# Patient Record
Sex: MALE | Race: OTHER | Employment: FULL TIME | URBAN - METROPOLITAN AREA
[De-identification: names, ages, dates, MRNs, and addresses within clinical notes are randomized per-mention and may not be internally consistent; named-entity substitution may affect disease eponyms.]

---

## 2017-04-17 ENCOUNTER — APPOINTMENT (OUTPATIENT)
Dept: LAB | Age: 57
End: 2017-04-17
Attending: FAMILY MEDICINE
Payer: COMMERCIAL

## 2017-04-17 ENCOUNTER — OFFICE VISIT (OUTPATIENT)
Dept: FAMILY MEDICINE CLINIC | Facility: CLINIC | Age: 57
End: 2017-04-17

## 2017-04-17 ENCOUNTER — LAB ENCOUNTER (OUTPATIENT)
Dept: LAB | Age: 57
End: 2017-04-17
Attending: FAMILY MEDICINE
Payer: COMMERCIAL

## 2017-04-17 VITALS
HEART RATE: 58 BPM | HEIGHT: 65.5 IN | DIASTOLIC BLOOD PRESSURE: 91 MMHG | SYSTOLIC BLOOD PRESSURE: 147 MMHG | WEIGHT: 184 LBS | TEMPERATURE: 98 F | BODY MASS INDEX: 30.29 KG/M2

## 2017-04-17 DIAGNOSIS — Z12.11 COLON CANCER SCREENING: ICD-10-CM

## 2017-04-17 DIAGNOSIS — Z00.00 PHYSICAL EXAM: Primary | ICD-10-CM

## 2017-04-17 DIAGNOSIS — N52.9 ERECTILE DYSFUNCTION, UNSPECIFIED ERECTILE DYSFUNCTION TYPE: ICD-10-CM

## 2017-04-17 DIAGNOSIS — Z00.00 PHYSICAL EXAM: ICD-10-CM

## 2017-04-17 PROCEDURE — 93010 ELECTROCARDIOGRAM REPORT: CPT

## 2017-04-17 PROCEDURE — 93005 ELECTROCARDIOGRAM TRACING: CPT

## 2017-04-17 PROCEDURE — 80061 LIPID PANEL: CPT

## 2017-04-17 PROCEDURE — 81003 URINALYSIS AUTO W/O SCOPE: CPT

## 2017-04-17 PROCEDURE — 99386 PREV VISIT NEW AGE 40-64: CPT | Performed by: FAMILY MEDICINE

## 2017-04-17 PROCEDURE — 83036 HEMOGLOBIN GLYCOSYLATED A1C: CPT

## 2017-04-17 PROCEDURE — 36415 COLL VENOUS BLD VENIPUNCTURE: CPT

## 2017-04-17 PROCEDURE — 90715 TDAP VACCINE 7 YRS/> IM: CPT | Performed by: FAMILY MEDICINE

## 2017-04-17 PROCEDURE — 85025 COMPLETE CBC W/AUTO DIFF WBC: CPT

## 2017-04-17 PROCEDURE — 90471 IMMUNIZATION ADMIN: CPT | Performed by: FAMILY MEDICINE

## 2017-04-17 PROCEDURE — 84443 ASSAY THYROID STIM HORMONE: CPT

## 2017-04-17 PROCEDURE — 80053 COMPREHEN METABOLIC PANEL: CPT

## 2017-04-17 PROCEDURE — 84153 ASSAY OF PSA TOTAL: CPT | Performed by: FAMILY MEDICINE

## 2017-04-17 PROCEDURE — 82306 VITAMIN D 25 HYDROXY: CPT | Performed by: FAMILY MEDICINE

## 2017-04-17 RX ORDER — SILDENAFIL 50 MG/1
50 TABLET, FILM COATED ORAL
Qty: 10 TABLET | Refills: 1 | Status: SHIPPED | OUTPATIENT
Start: 2017-04-17 | End: 2017-12-20

## 2017-04-17 NOTE — PROGRESS NOTES
4/17/2017  11:06 AM    Phillip Coulter is a 55year old male. Chief complaint(s): Patient presents with:  Routine Physical    HPI:     Phillip Coulter primary complaint is regarding CPE.      Phillip Coulter is a 55year old male is here for routine periodic health scr polydipsia and polyuria. Genitourinary: Positive for sexual dysfunction. Negative for bladder incontinence, urgency, frequency, hematuria, decreased urine volume, discharge, difficulty urinating, genital sores, testicular pain and nocturia.    Musculoskel swelling and no tenderness. Uncircumcised. Musculoskeletal:   Spinal exam without scoliosis. Lymphadenopathy:   Negative for cervical, axillary or inguinal lymphadenopathy. There are no lower extremities edema or varicose veins.    Neurological: No s Recommendations on physical activity; exercise daily or at least 3 times a week for 30-60 minutes doing cardiovascular exercise. Encourage to maintain the best physical and dental hygiene possible.   Consider a  if overweight and/or having d

## 2017-04-18 NOTE — PROGRESS NOTES
Quick Note:    Please notify patient that his/her cholesterol levels were slight elevated.  However, it only requires to follow a low cholesterol / low fat diet, exercise and recheck in 12 months.  ______

## 2017-04-19 RX ORDER — ERGOCALCIFEROL 1.25 MG/1
50000 CAPSULE ORAL WEEKLY
Qty: 12 CAPSULE | Refills: 4 | Status: SHIPPED | OUTPATIENT
Start: 2017-04-19 | End: 2017-05-19

## 2017-04-25 ENCOUNTER — TELEPHONE (OUTPATIENT)
Dept: FAMILY MEDICINE CLINIC | Facility: CLINIC | Age: 57
End: 2017-04-25

## 2017-12-20 ENCOUNTER — OFFICE VISIT (OUTPATIENT)
Dept: FAMILY MEDICINE CLINIC | Facility: CLINIC | Age: 57
End: 2017-12-20

## 2017-12-20 VITALS
HEIGHT: 65.5 IN | HEART RATE: 75 BPM | SYSTOLIC BLOOD PRESSURE: 137 MMHG | DIASTOLIC BLOOD PRESSURE: 76 MMHG | WEIGHT: 189 LBS | BODY MASS INDEX: 31.11 KG/M2

## 2017-12-20 DIAGNOSIS — M54.32 SCIATICA OF LEFT SIDE: Primary | ICD-10-CM

## 2017-12-20 PROCEDURE — 99213 OFFICE O/P EST LOW 20 MIN: CPT | Performed by: NURSE PRACTITIONER

## 2017-12-20 RX ORDER — A/SINGAPORE/GP1908/2015 IVR-180 (H1N1) (AN A/MICHIGAN/45/2015-LIKE VIRUS), A/SINGAPORE/GP2050/2015 (H3N2) (AN A/HONG KONG/4801/2014 - LIKE VIRUS), B/UTAH/9/2014 (A B/PHUKET/3073/2013-LIKE VIRUS), B/HONG KONG/259/2010 (A B/BRISBANE/60/08-LIKE VIRUS) 15; 15; 15; 15 UG/.5ML; UG/.5ML; UG/.5ML; UG/.5ML
INJECTION, SUSPENSION INTRAMUSCULAR
COMMUNITY
Start: 2017-10-16 | End: 2017-12-20 | Stop reason: ALTCHOICE

## 2017-12-20 RX ORDER — PREDNISONE 20 MG/1
TABLET ORAL
Qty: 15 TABLET | Refills: 0 | Status: SHIPPED | OUTPATIENT
Start: 2017-12-20

## 2017-12-20 NOTE — PROGRESS NOTES
HPI  Pt presents with increasing pain down post left leg-starts from mid buttocks to back of knee. No known injury, never had pain like this before. Denies low back pain, difficulty urinating or moving bowels.      Review of Systems   Constitutional: Posit tenderness. Left hip: He exhibits tenderness. He exhibits normal range of motion and normal strength.    Negative for pain w flexion, extension, internal or external rotation to left buttocks and leg; + pain elicited with straight leg raises   Neurol

## 2017-12-20 NOTE — PATIENT INSTRUCTIONS
Ciática    La ciática es eladio afección que produce dolor en la parte baja de la espalda y [place] glúteos, la cadera y la pierna. En ocasiones, podría doler la pierna sin que haya dolor en la espalda.  La ciática se produce cuando un nervio funez está · Es posible que necesite permanecer en cama los primeros días. Denis, tan pronto bc le sea posible, comience a sentarse o caminar. Eso le ayudará a evitar los problemas que se producen por estar mucho tiempo en cama.   · Mientras esté en la cama, intente Si le mcgrath hecho radiografías, las evaluará un radiólogo. Le informarán de los nuevos hallazgos que puedan afectar moody atención [place]. ¿Cuándo debe buscar atención médica?   Llame a moody proveedor de [facility] de inmediato si sucede cualquiera de las si

## 2023-08-17 ENCOUNTER — OFFICE VISIT (OUTPATIENT)
Dept: INTERNAL MEDICINE CLINIC | Facility: CLINIC | Age: 63
End: 2023-08-17

## 2023-08-17 VITALS
HEART RATE: 70 BPM | DIASTOLIC BLOOD PRESSURE: 92 MMHG | TEMPERATURE: 98 F | BODY MASS INDEX: 30.37 KG/M2 | SYSTOLIC BLOOD PRESSURE: 168 MMHG | RESPIRATION RATE: 17 BRPM | OXYGEN SATURATION: 98 % | WEIGHT: 189 LBS | HEIGHT: 66 IN

## 2023-08-17 DIAGNOSIS — Z00.00 ENCOUNTER FOR PREVENTIVE CARE: ICD-10-CM

## 2023-08-17 DIAGNOSIS — E55.9 VITAMIN D DEFICIENCY: ICD-10-CM

## 2023-08-17 DIAGNOSIS — Z00.00 ANNUAL PHYSICAL EXAM: Primary | ICD-10-CM

## 2023-08-17 DIAGNOSIS — I10 PRIMARY HYPERTENSION: ICD-10-CM

## 2023-08-17 PROCEDURE — 3008F BODY MASS INDEX DOCD: CPT | Performed by: INTERNAL MEDICINE

## 2023-08-17 PROCEDURE — 3080F DIAST BP >= 90 MM HG: CPT | Performed by: INTERNAL MEDICINE

## 2023-08-17 PROCEDURE — 3077F SYST BP >= 140 MM HG: CPT | Performed by: INTERNAL MEDICINE

## 2023-08-17 PROCEDURE — 99242 OFF/OP CONSLTJ NEW/EST SF 20: CPT | Performed by: INTERNAL MEDICINE

## 2023-08-17 RX ORDER — LISINOPRIL 5 MG/1
5 TABLET ORAL DAILY
Qty: 30 TABLET | Refills: 2 | Status: SHIPPED | OUTPATIENT
Start: 2023-08-17

## 2023-08-19 ENCOUNTER — LAB ENCOUNTER (OUTPATIENT)
Dept: LAB | Facility: HOSPITAL | Age: 63
End: 2023-08-19
Attending: INTERNAL MEDICINE
Payer: COMMERCIAL

## 2023-08-19 DIAGNOSIS — E55.9 VITAMIN D DEFICIENCY: ICD-10-CM

## 2023-08-19 DIAGNOSIS — Z00.00 ENCOUNTER FOR PREVENTIVE CARE: ICD-10-CM

## 2023-08-19 DIAGNOSIS — Z00.00 ANNUAL PHYSICAL EXAM: ICD-10-CM

## 2023-08-19 DIAGNOSIS — I10 PRIMARY HYPERTENSION: ICD-10-CM

## 2023-08-19 LAB
ALBUMIN SERPL-MCNC: 3.7 G/DL (ref 3.4–5)
ALBUMIN/GLOB SERPL: 0.8 {RATIO} (ref 1–2)
ALP LIVER SERPL-CCNC: 143 U/L
ALT SERPL-CCNC: 60 U/L
ANION GAP SERPL CALC-SCNC: 5 MMOL/L (ref 0–18)
AST SERPL-CCNC: 31 U/L (ref 15–37)
ATRIAL RATE: 63 BPM
BASOPHILS # BLD AUTO: 0.05 X10(3) UL (ref 0–0.2)
BASOPHILS NFR BLD AUTO: 0.7 %
BILIRUB SERPL-MCNC: 0.5 MG/DL (ref 0.1–2)
BILIRUB UR QL: NEGATIVE
BUN BLD-MCNC: 12 MG/DL (ref 7–18)
BUN/CREAT SERPL: 16.9 (ref 10–20)
CALCIUM BLD-MCNC: 9 MG/DL (ref 8.5–10.1)
CHLORIDE SERPL-SCNC: 107 MMOL/L (ref 98–112)
CHOLEST SERPL-MCNC: 183 MG/DL (ref ?–200)
CLARITY UR: CLEAR
CO2 SERPL-SCNC: 25 MMOL/L (ref 21–32)
COMPLEXED PSA SERPL-MCNC: 1.64 NG/ML (ref ?–4)
CREAT BLD-MCNC: 0.71 MG/DL
DEPRECATED RDW RBC AUTO: 40.7 FL (ref 35.1–46.3)
EGFRCR SERPLBLD CKD-EPI 2021: 104 ML/MIN/1.73M2 (ref 60–?)
EOSINOPHIL # BLD AUTO: 0.58 X10(3) UL (ref 0–0.7)
EOSINOPHIL NFR BLD AUTO: 8.7 %
ERYTHROCYTE [DISTWIDTH] IN BLOOD BY AUTOMATED COUNT: 12.3 % (ref 11–15)
FASTING PATIENT LIPID ANSWER: YES
FASTING STATUS PATIENT QL REPORTED: YES
GLOBULIN PLAS-MCNC: 4.5 G/DL (ref 2.8–4.4)
GLUCOSE BLD-MCNC: 97 MG/DL (ref 70–99)
GLUCOSE UR-MCNC: NORMAL MG/DL
HCT VFR BLD AUTO: 48.6 %
HDLC SERPL-MCNC: 51 MG/DL (ref 40–59)
HGB BLD-MCNC: 16.9 G/DL
HGB UR QL STRIP.AUTO: NEGATIVE
IMM GRANULOCYTES # BLD AUTO: 0.03 X10(3) UL (ref 0–1)
IMM GRANULOCYTES NFR BLD: 0.4 %
KETONES UR-MCNC: NEGATIVE MG/DL
LDLC SERPL CALC-MCNC: 108 MG/DL (ref ?–100)
LEUKOCYTE ESTERASE UR QL STRIP.AUTO: NEGATIVE
LYMPHOCYTES # BLD AUTO: 2 X10(3) UL (ref 1–4)
LYMPHOCYTES NFR BLD AUTO: 29.9 %
MCH RBC QN AUTO: 31.2 PG (ref 26–34)
MCHC RBC AUTO-ENTMCNC: 34.8 G/DL (ref 31–37)
MCV RBC AUTO: 89.8 FL
MONOCYTES # BLD AUTO: 0.48 X10(3) UL (ref 0.1–1)
MONOCYTES NFR BLD AUTO: 7.2 %
NEUTROPHILS # BLD AUTO: 3.56 X10 (3) UL (ref 1.5–7.7)
NEUTROPHILS # BLD AUTO: 3.56 X10(3) UL (ref 1.5–7.7)
NEUTROPHILS NFR BLD AUTO: 53.1 %
NITRITE UR QL STRIP.AUTO: NEGATIVE
NONHDLC SERPL-MCNC: 132 MG/DL (ref ?–130)
OSMOLALITY SERPL CALC.SUM OF ELEC: 284 MOSM/KG (ref 275–295)
P AXIS: 59 DEGREES
P-R INTERVAL: 162 MS
PH UR: 6.5 [PH] (ref 5–8)
PLATELET # BLD AUTO: 236 10(3)UL (ref 150–450)
POTASSIUM SERPL-SCNC: 4.4 MMOL/L (ref 3.5–5.1)
PROT SERPL-MCNC: 8.2 G/DL (ref 6.4–8.2)
PROT UR-MCNC: NEGATIVE MG/DL
Q-T INTERVAL: 390 MS
QRS DURATION: 84 MS
QTC CALCULATION (BEZET): 399 MS
R AXIS: 15 DEGREES
RBC # BLD AUTO: 5.41 X10(6)UL
SODIUM SERPL-SCNC: 137 MMOL/L (ref 136–145)
SP GR UR STRIP: 1.01 (ref 1–1.03)
T AXIS: 3 DEGREES
TRIGL SERPL-MCNC: 136 MG/DL (ref 30–149)
TSI SER-ACNC: 3.35 MIU/ML (ref 0.36–3.74)
UROBILINOGEN UR STRIP-ACNC: NORMAL
VENTRICULAR RATE: 63 BPM
VIT D+METAB SERPL-MCNC: 17.3 NG/ML (ref 30–100)
VLDLC SERPL CALC-MCNC: 23 MG/DL (ref 0–30)
WBC # BLD AUTO: 6.7 X10(3) UL (ref 4–11)

## 2023-08-19 PROCEDURE — 82306 VITAMIN D 25 HYDROXY: CPT

## 2023-08-19 PROCEDURE — 93010 ELECTROCARDIOGRAM REPORT: CPT | Performed by: INTERNAL MEDICINE

## 2023-08-19 PROCEDURE — 93005 ELECTROCARDIOGRAM TRACING: CPT

## 2023-08-19 PROCEDURE — 36415 COLL VENOUS BLD VENIPUNCTURE: CPT

## 2023-08-19 PROCEDURE — 84443 ASSAY THYROID STIM HORMONE: CPT

## 2023-08-19 PROCEDURE — 85025 COMPLETE CBC W/AUTO DIFF WBC: CPT

## 2023-08-19 PROCEDURE — 81003 URINALYSIS AUTO W/O SCOPE: CPT

## 2023-08-19 PROCEDURE — 80061 LIPID PANEL: CPT

## 2023-08-19 PROCEDURE — 80053 COMPREHEN METABOLIC PANEL: CPT

## 2023-08-23 NOTE — PROGRESS NOTES
Subjective:     Patient ID: Dante Valadez is a 65year old male. HPI  Patient comes in for first time to establish care for annual physical denies any complaints blood pressure is elevated patient says he is not aware denies any headache no chest pain or shortness of breath  History/Other:   Review of Systems   Constitutional: Negative. HENT: Negative. Eyes: Negative. Respiratory: Negative. Cardiovascular: Negative. Gastrointestinal: Negative. Genitourinary: Negative. Musculoskeletal: Negative. Skin: Negative. Neurological: Negative. Psychiatric/Behavioral: Negative. Current Outpatient Medications   Medication Sig Dispense Refill    lisinopril 5 MG Oral Tab Take 1 tablet (5 mg total) by mouth daily. 30 tablet 2    ergocalciferol 1.25 MG (32735 UT) Oral Cap Take 1 capsule (50,000 Units total) by mouth once a week. 8 capsule 0     Allergies:No Known Allergies    No past medical history on file. History reviewed. No pertinent surgical history. Family History   Problem Relation Age of Onset    Lung Disorder Mother     Heart Disease Father       Social History:   Social History     Socioeconomic History    Marital status:    Tobacco Use    Smoking status: Former     Types: Cigarettes     Quit date: 2008     Years since quitting: 15.6    Smokeless tobacco: Former   Vaping Use    Vaping Use: Never used   Substance and Sexual Activity    Alcohol use: Yes     Comment: occasionally    Drug use: Never        Objective:   Physical Exam  Vitals and nursing note reviewed. Constitutional:       Appearance: He is well-developed. HENT:      Head: Normocephalic and atraumatic. Right Ear: External ear normal.      Left Ear: External ear normal.      Nose: Nose normal.   Eyes:      Conjunctiva/sclera: Conjunctivae normal.      Pupils: Pupils are equal, round, and reactive to light. Cardiovascular:      Rate and Rhythm: Normal rate and regular rhythm.       Heart sounds: Normal heart sounds. Pulmonary:      Effort: Pulmonary effort is normal.      Breath sounds: Normal breath sounds. Abdominal:      General: Bowel sounds are normal.      Palpations: Abdomen is soft. Genitourinary:     Penis: Normal.       Prostate: Normal.      Rectum: Normal.   Musculoskeletal:         General: Normal range of motion. Cervical back: Normal range of motion and neck supple. Skin:     General: Skin is warm and dry. Neurological:      Mental Status: He is alert and oriented to person, place, and time. Deep Tendon Reflexes: Reflexes are normal and symmetric. Assessment & Plan:   Annual physical exam  (primary encounter diagnosis) exam is okay overall labs  Primary hypertension-we will start medication watch diet watch salt intake follow-up back  Encounter for preventiv we will refer to GI for screening colonoscopy e care  Vitamin D deficiency    Orders Placed This Encounter      Vitamin D [E]      CBC With Differential With Platelet      Comp Metabolic Panel (14)      Lipid Panel      TSH W Reflex To Free T4      Urinalysis, Routine      PSA Total, Screen      Meds This Visit:  Requested Prescriptions     Signed Prescriptions Disp Refills    lisinopril 5 MG Oral Tab 30 tablet 2     Sig: Take 1 tablet (5 mg total) by mouth daily.        Imaging & Referrals:  OP REFERRAL TO [facility] GI TELEPHONE COLON SCREEN

## 2023-10-05 ENCOUNTER — OFFICE VISIT (OUTPATIENT)
Dept: INTERNAL MEDICINE CLINIC | Facility: CLINIC | Age: 63
End: 2023-10-05

## 2023-10-05 VITALS
HEART RATE: 61 BPM | HEIGHT: 66 IN | SYSTOLIC BLOOD PRESSURE: 154 MMHG | OXYGEN SATURATION: 98 % | WEIGHT: 191 LBS | TEMPERATURE: 98 F | DIASTOLIC BLOOD PRESSURE: 94 MMHG | BODY MASS INDEX: 30.7 KG/M2

## 2023-10-05 DIAGNOSIS — R79.89 LOW VITAMIN D LEVEL: ICD-10-CM

## 2023-10-05 DIAGNOSIS — E78.5 HYPERLIPIDEMIA, UNSPECIFIED HYPERLIPIDEMIA TYPE: ICD-10-CM

## 2023-10-05 DIAGNOSIS — I10 PRIMARY HYPERTENSION: Primary | ICD-10-CM

## 2023-10-05 PROCEDURE — 99214 OFFICE O/P EST MOD 30 MIN: CPT | Performed by: INTERNAL MEDICINE

## 2023-10-05 PROCEDURE — 3008F BODY MASS INDEX DOCD: CPT | Performed by: INTERNAL MEDICINE

## 2023-10-05 PROCEDURE — 3080F DIAST BP >= 90 MM HG: CPT | Performed by: INTERNAL MEDICINE

## 2023-10-05 PROCEDURE — 3077F SYST BP >= 140 MM HG: CPT | Performed by: INTERNAL MEDICINE

## 2023-10-05 RX ORDER — ERGOCALCIFEROL 1.25 MG/1
50000 CAPSULE ORAL WEEKLY
Qty: 4 CAPSULE | Refills: 0 | Status: SHIPPED | OUTPATIENT
Start: 2023-10-05 | End: 2023-11-04

## 2023-10-05 RX ORDER — LISINOPRIL 5 MG/1
5 TABLET ORAL DAILY
Qty: 30 TABLET | Refills: 2 | Status: SHIPPED | OUTPATIENT
Start: 2023-10-05

## 2023-10-05 NOTE — PROGRESS NOTES
Subjective:     Patient ID: Marcie Chaney is a 65year old male. HPI  Patient comes in today for follow-up seen in August is a new patient blood pressure was elevated we will start on medication patient says he ran out of medication about a week ago he has not taken it while at home blood pressure was much better while taking medication soundsHistory/Other:   Review of Systems   Constitutional: Negative. HENT: Negative. Eyes: Negative. Respiratory: Negative. Cardiovascular: Negative. Gastrointestinal: Negative. Genitourinary: Negative. Musculoskeletal: Negative. Skin: Negative. Neurological: Negative. Psychiatric/Behavioral: Negative. Current Outpatient Medications   Medication Sig Dispense Refill    lisinopril 5 MG Oral Tab Take 1 tablet (5 mg total) by mouth daily. 30 tablet 2    ergocalciferol 1.25 MG (81600 UT) Oral Cap Take 1 capsule (50,000 Units total) by mouth once a week. 4 capsule 0     Allergies:No Known Allergies    Past Medical History:   Diagnosis Date    Essential hypertension 08/17/2023      No past surgical history on file. Family History   Problem Relation Age of Onset    Lung Disorder Mother     Heart Disease Father       Social History:   Social History     Socioeconomic History    Marital status:    Tobacco Use    Smoking status: Former     Types: Cigarettes     Quit date: 1/1/2008     Years since quitting: 15.7    Smokeless tobacco: Former   Vaping Use    Vaping Use: Never used   Substance and Sexual Activity    Alcohol use: Yes     Alcohol/week: 15.0 standard drinks of alcohol     Types: 15 Cans of beer per week     Comment: occasionally    Drug use: Never        Objective:   Physical Exam  Vitals and nursing note reviewed. Constitutional:       Appearance: He is well-developed. HENT:      Head: Normocephalic and atraumatic.       Right Ear: External ear normal.      Left Ear: External ear normal.      Nose: Nose normal.   Eyes: Conjunctiva/sclera: Conjunctivae normal.      Pupils: Pupils are equal, round, and reactive to light. Cardiovascular:      Rate and Rhythm: Normal rate and regular rhythm. Heart sounds: Normal heart sounds. Pulmonary:      Effort: Pulmonary effort is normal.      Breath sounds: Normal breath sounds. Abdominal:      General: Bowel sounds are normal.      Palpations: Abdomen is soft. Genitourinary:     Penis: Normal.       Prostate: Normal.      Rectum: Normal.   Musculoskeletal:         General: Normal range of motion. Cervical back: Normal range of motion and neck supple. Skin:     General: Skin is warm and dry. Neurological:      Mental Status: He is alert and oriented to person, place, and time. Deep Tendon Reflexes: Reflexes are normal and symmetric. Assessment & Plan:   Primary hypertension  (primary encounter diagnosis) we will refill the medication watch diet take it as prescribed follow back in 2 weeks for blood pressure check  Hyperlipidemia, unspecified hyperlipidemia type watch diet we will retest in 6 months  Low vitamin D level continue to take for 4 more weeks then take over-the-counter supplements we can recheck in 6 months     No orders of the defined types were placed in this encounter. Meds This Visit:  Requested Prescriptions     Signed Prescriptions Disp Refills    lisinopril 5 MG Oral Tab 30 tablet 2     Sig: Take 1 tablet (5 mg total) by mouth daily. ergocalciferol 1.25 MG (77732 UT) Oral Cap 4 capsule 0     Sig: Take 1 capsule (50,000 Units total) by mouth once a week.        Imaging & Referrals:  None

## 2023-10-28 ENCOUNTER — OFFICE VISIT (OUTPATIENT)
Dept: INTERNAL MEDICINE CLINIC | Facility: CLINIC | Age: 63
End: 2023-10-28

## 2023-10-28 VITALS
HEART RATE: 67 BPM | BODY MASS INDEX: 30.53 KG/M2 | SYSTOLIC BLOOD PRESSURE: 120 MMHG | RESPIRATION RATE: 17 BRPM | TEMPERATURE: 98 F | WEIGHT: 190 LBS | OXYGEN SATURATION: 98 % | HEIGHT: 66 IN | DIASTOLIC BLOOD PRESSURE: 64 MMHG

## 2023-10-28 DIAGNOSIS — Z00.00 ANNUAL PHYSICAL EXAM: Primary | ICD-10-CM

## 2023-10-28 DIAGNOSIS — E78.5 HYPERLIPIDEMIA, UNSPECIFIED HYPERLIPIDEMIA TYPE: ICD-10-CM

## 2023-10-28 DIAGNOSIS — I10 PRIMARY HYPERTENSION: ICD-10-CM

## 2023-10-28 DIAGNOSIS — E55.9 VITAMIN D DEFICIENCY: ICD-10-CM

## 2023-10-28 PROCEDURE — 99396 PREV VISIT EST AGE 40-64: CPT | Performed by: INTERNAL MEDICINE

## 2023-10-28 PROCEDURE — 3078F DIAST BP <80 MM HG: CPT | Performed by: INTERNAL MEDICINE

## 2023-10-28 PROCEDURE — 3074F SYST BP LT 130 MM HG: CPT | Performed by: INTERNAL MEDICINE

## 2023-10-28 PROCEDURE — 3008F BODY MASS INDEX DOCD: CPT | Performed by: INTERNAL MEDICINE

## 2023-10-28 NOTE — PROGRESS NOTES
Subjective:     Patient ID: Mariza Nava is a 59year old male. HPI    Patient comes in for annual physical overall doing okay denies any complaints last time blood pressure was elevated and started on medication has been taking it blood pressure well controlled denies any chest pain or shortness of breath  He was a smoker before quitting about 15 years ago    History/Other:   Review of Systems   Constitutional: Negative. HENT: Negative. Eyes: Negative. Respiratory: Negative. Cardiovascular: Negative. Gastrointestinal: Negative. Genitourinary: Negative. Musculoskeletal: Negative. Skin: Negative. Neurological: Negative. Psychiatric/Behavioral: Negative. Current Outpatient Medications   Medication Sig Dispense Refill    lisinopril 5 MG Oral Tab Take 1 tablet (5 mg total) by mouth daily. 30 tablet 2    ergocalciferol 1.25 MG (16839 UT) Oral Cap Take 1 capsule (50,000 Units total) by mouth once a week. 4 capsule 0     Allergies:No Known Allergies    Past Medical History:   Diagnosis Date    Essential hypertension 08/17/2023      No past surgical history on file. Family History   Problem Relation Age of Onset    Lung Disorder Mother     Heart Disease Father       Social History:   Social History     Socioeconomic History    Marital status:    Tobacco Use    Smoking status: Former     Types: Cigarettes     Quit date: 1/1/2008     Years since quitting: 15.8    Smokeless tobacco: Former   Vaping Use    Vaping Use: Never used   Substance and Sexual Activity    Alcohol use: Yes     Alcohol/week: 15.0 standard drinks of alcohol     Types: 15 Cans of beer per week     Comment: occasionally    Drug use: Never        Objective:   Physical Exam  Vitals and nursing note reviewed. Constitutional:       Appearance: He is well-developed. HENT:      Head: Normocephalic and atraumatic.       Right Ear: External ear normal.      Left Ear: External ear normal.      Nose: Nose normal. Eyes:      Conjunctiva/sclera: Conjunctivae normal.      Pupils: Pupils are equal, round, and reactive to light. Cardiovascular:      Rate and Rhythm: Normal rate and regular rhythm. Heart sounds: Normal heart sounds. Pulmonary:      Effort: Pulmonary effort is normal.      Breath sounds: Normal breath sounds. Abdominal:      General: Bowel sounds are normal.      Palpations: Abdomen is soft. Genitourinary:     Penis: Normal.       Prostate: Normal.      Rectum: Normal.   Musculoskeletal:         General: Normal range of motion. Cervical back: Normal range of motion and neck supple. Skin:     General: Skin is warm and dry. Neurological:      Mental Status: He is alert and oriented to person, place, and time. Deep Tendon Reflexes: Reflexes are normal and symmetric. Assessment & Plan:   Annual physical exam  (primary encounter diagnosis) exam is okay will order labs well-controlled continue current treatment  Primary hypertension  Hyperlipidemia, unspecified hyperlipidemia type watch diet we will recheck in 6 months  Vitamin D deficiency continue current treatment we will recheck in 6 months  Encourage patient to get cardiac calcium score done  Also given referral to GI before he will schedule an appointment    No orders of the defined types were placed in this encounter.       Meds This Visit:  Requested Prescriptions      No prescriptions requested or ordered in this encounter       Imaging & Referrals:  [facility]

## 2024-04-11 RX ORDER — LISINOPRIL 5 MG/1
5 TABLET ORAL DAILY
Qty: 90 TABLET | Refills: 3 | Status: SHIPPED | OUTPATIENT
Start: 2024-04-11

## 2024-04-11 NOTE — TELEPHONE ENCOUNTER
Refill Per Protocol     Requested Prescriptions   Pending Prescriptions Disp Refills    LISINOPRIL 5 MG Oral Tab [Pharmacy Med Name: LISINOPRIL 5MG TABLETS] 30 tablet 2     Sig: TAKE 1 TABLET(5 MG) BY MOUTH DAILY       Hypertension Medications Protocol Passed - 4/9/2024  5:33 PM        Passed - CMP or BMP in past 12 months        Passed - Last BP reading less than 140/90     BP Readings from Last 1 Encounters:   10/28/23 120/64               Passed - In person appointment or virtual visit in the past 12 mos or appointment in next 3 mos     Recent Outpatient Visits              [facility]    [facility]    6 months ago [facility]    Office Visit    7 months ago Annual physical exam    [facility], Mayank Bender MD    Office Visit    6 years ago Sciatica of left side    [facility], Isabel Alcala APRN    Office Visit    6 years ago Physical exam    [facility], Praveen Lucas MD    Office Visit          Future Appointments         Provider Department Appt Notes    In 2 weeks Mayank Montes MD [facility]                    Passed - EGFRCR or GFRNAA > 50     GFR Evaluation  EGFRCR: 104 , resulted on 8/19/2023                 Future Appointments         Provider Department Appt Notes    In 2 weeks [facility]          Recent Outpatient Visits              5 months ago Annual physical exam    [facility]    Office Visit    6 months ago Primary hypertension    [facility]    Office  Visit    7 months ago Annual physical exam    [facility], Mayank Bender MD    Office Visit    6 years ago Sciatica of left side    [facility], Isabel Alcala APRN    Office Visit    6 years ago Physical exam    [facility], Praveen Lucas MD    Office Visit

## 2024-04-29 ENCOUNTER — TELEPHONE (OUTPATIENT)
Dept: INTERNAL MEDICINE CLINIC | Facility: CLINIC | Age: 64
End: 2024-04-29

## 2025-03-30 ENCOUNTER — HOSPITAL ENCOUNTER (EMERGENCY)
Facility: HOSPITAL | Age: 65
Discharge: HOME | End: 2025-03-30
Attending: STUDENT IN AN ORGANIZED HEALTH CARE EDUCATION/TRAINING PROGRAM
Payer: COMMERCIAL

## 2025-03-30 VITALS
HEIGHT: 65 IN | OXYGEN SATURATION: 97 % | DIASTOLIC BLOOD PRESSURE: 94 MMHG | TEMPERATURE: 97 F | SYSTOLIC BLOOD PRESSURE: 120 MMHG | RESPIRATION RATE: 22 BRPM | HEART RATE: 94 BPM | BODY MASS INDEX: 31.65 KG/M2 | WEIGHT: 190 LBS

## 2025-03-30 DIAGNOSIS — T78.40XA ALLERGIC REACTION, INITIAL ENCOUNTER: Primary | ICD-10-CM

## 2025-03-30 LAB
ANION GAP SERPL CALC-SCNC: 6 MMOL/L (ref 0–18)
BASOPHILS # BLD: 0 X10(3) UL (ref 0–0.2)
BASOPHILS NFR BLD: 0 %
BUN BLD-MCNC: 16 MG/DL (ref 9–23)
BUN/CREAT SERPL: 21.9 (ref 10–20)
CALCIUM BLD-MCNC: 8.5 MG/DL (ref 8.7–10.4)
CHLORIDE SERPL-SCNC: 102 MMOL/L (ref 98–112)
CO2 SERPL-SCNC: 24 MMOL/L (ref 21–32)
CREAT BLD-MCNC: 0.73 MG/DL
DEPRECATED RDW RBC AUTO: 42.2 FL (ref 35.1–46.3)
EGFRCR SERPLBLD CKD-EPI 2021: 102 ML/MIN/1.73M2 (ref 60–?)
EOSINOPHIL # BLD: 0.29 X10(3) UL (ref 0–0.7)
EOSINOPHIL NFR BLD: 4 %
ERYTHROCYTE [DISTWIDTH] IN BLOOD BY AUTOMATED COUNT: 13.5 % (ref 11–15)
GLUCOSE BLD-MCNC: 105 MG/DL (ref 70–99)
HCT VFR BLD AUTO: 38.7 %
HGB BLD-MCNC: 13.4 G/DL
LYMPHOCYTES NFR BLD: 0.94 X10(3) UL (ref 1–4)
LYMPHOCYTES NFR BLD: 13 %
MCH RBC QN AUTO: 29.9 PG (ref 26–34)
MCHC RBC AUTO-ENTMCNC: 34.6 G/DL (ref 31–37)
MCV RBC AUTO: 86.4 FL
METAMYELOCYTES # BLD: 0.07 X10(3) UL
METAMYELOCYTES NFR BLD: 1 %
MONOCYTES # BLD: 0.29 X10(3) UL (ref 0.1–1)
MONOCYTES NFR BLD: 4 %
MORPHOLOGY: NORMAL
NEUTROPHILS # BLD AUTO: 5.67 X10 (3) UL (ref 1.5–7.7)
NEUTROPHILS NFR BLD: 76 %
NEUTS BAND NFR BLD: 2 %
NEUTS HYPERSEG # BLD: 5.62 X10(3) UL (ref 1.5–7.7)
OSMOLALITY SERPL CALC.SUM OF ELEC: 276 MOSM/KG (ref 275–295)
PLATELET # BLD AUTO: 228 10(3)UL (ref 150–450)
PLATELET MORPHOLOGY: NORMAL
POTASSIUM SERPL-SCNC: 3.4 MMOL/L (ref 3.5–5.1)
RBC # BLD AUTO: 4.48 X10(6)UL
SODIUM SERPL-SCNC: 132 MMOL/L (ref 136–145)
TOTAL CELLS COUNTED BLD: 100
WBC # BLD AUTO: 7.2 X10(3) UL (ref 4–11)

## 2025-03-30 PROCEDURE — 99283 EMERGENCY DEPT VISIT LOW MDM: CPT

## 2025-03-30 PROCEDURE — 85025 COMPLETE CBC W/AUTO DIFF WBC: CPT | Performed by: STUDENT IN AN ORGANIZED HEALTH CARE EDUCATION/TRAINING PROGRAM

## 2025-03-30 PROCEDURE — 80048 BASIC METABOLIC PNL TOTAL CA: CPT | Performed by: STUDENT IN AN ORGANIZED HEALTH CARE EDUCATION/TRAINING PROGRAM

## 2025-03-30 PROCEDURE — 36415 COLL VENOUS BLD VENIPUNCTURE: CPT

## 2025-03-30 RX ORDER — PREDNISONE 20 MG/1
60 TABLET ORAL DAILY
Qty: 12 TABLET | Refills: 0 | Status: SHIPPED | OUTPATIENT
Start: 2025-03-31 | End: 2025-04-04

## 2025-03-30 RX ORDER — CETIRIZINE HYDROCHLORIDE 10 MG/1
10 TABLET ORAL DAILY
Qty: 30 TABLET | Refills: 0 | Status: SHIPPED | OUTPATIENT
Start: 2025-03-30 | End: 2025-04-29

## 2025-03-30 NOTE — ED INITIAL ASSESSMENT (HPI)
PT to ED with spouse. PT alert and orientated x4, steady gait.   PT reporting hives since Friday, airway patent.   PT reporting this happened last week but resolved without intervention.   PT reporting unknown allergies, works with wood.

## 2025-03-30 NOTE — ED PROVIDER NOTES
Patient Seen in: [facility] Emergency Department      History     Chief Complaint   Patient presents with    Allergic Rxn Allergies     Stated Complaint: rash/allergic rxn    Subjective:   HPI      64-year-old male history of hypertension presenting with concerns for an allergic reaction.  Accompanied by his spouse.  He states onset of symptoms Friday.  Describes a diffuse rash, involving extremities trunk and face.  States had similar in the past which resolved spontaneously.  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  He denies shortness of breath, throat closing or swelling, difficulty speaking or swallowing.    Objective:     Past Medical History:    Essential hypertension              History reviewed. No pertinent surgical history.             Social History     Socioeconomic History    Marital status:    Tobacco Use    Smoking status: Former     Current packs/day: 0.00     Types: Cigarettes     Quit date: 2008     Years since quittin.2    Smokeless tobacco: Former   Vaping Use    Vaping status: Never Used   Substance and Sexual Activity    Alcohol use: Yes     Alcohol/week: 15.0 standard drinks of alcohol     Types: 15 Cans of beer per week     Comment: occasionally    Drug use: Never                  Physical Exam     ED Triage Vitals   BP 25 1515 145/79   Pulse 25 1515 106   Resp 25 1515 22   Temp 25 1515 97.4 °F (36.3 °C)   Temp src 25 1515 Oral   SpO2 25 1515 96 %   O2 Device 25 1715 None (Room air)       Current Vitals:   Vital Signs  BP: (!) 120/94  Pulse: 94  Resp: 22  Temp: 97.4 °F (36.3 °C)  Temp src: Oral  MAP (mmHg): (!) 104    Oxygen Therapy  SpO2: 97 %  O2 Device: None (Room air)        Physical Exam  Constitutional: awake, alert, no sig distress  HENT: mmm, no lesions,  Neck: normal range of motion, no tenderness, supple.  Eyes: PERRL, EOMI, conjunctiva  normal, no discharge. Sclera anicteric.  Cardiovascular: rr no murmur  Respiratory: Normal breath sounds, no respiratory distress, no wheezing, no chest tenderness.  GI: Bowel sounds normal, Soft, no tenderness, no masses, no pulsatile masses.  : No CVA tenderness.  Skin: Warm, dry,  diffuse urticarial rash -spares palms soles and mucous membranes  Musculoskeletal: Intact distal pulses, no edema, no tenderness, no cyanosis, no clubbing. Good range of motion in all major joints. No tenderness to palpation or major deformities noted. Back- No tenderness.  Neurologic: Alert & oriented x 3, normal motor function, normal sensory function, no focal deficits noted.  Psych: Calm, cooperative, nl affect        ED Course     Labs Reviewed   BASIC METABOLIC PANEL (8) - Abnormal; Notable for the following components:       Result Value    Glucose 105 (*)     Sodium 132 (*)     Potassium 3.4 (*)     BUN/CREA Ratio 21.9 (*)     Calcium, Total 8.5 (*)     All other components within normal limits   CBC WITH DIFFERENTIAL WITH PLATELET - Abnormal; Notable for the following components:    HCT 38.7 (*)     All other components within normal limits   MANUAL DIFFERENTIAL - Abnormal; Notable for the following components:    Lymphocyte Absolute Manual 0.94 (*)     Metamyelocyte Absolute Manual 0.07 (*)     All other components within normal limits   SCAN SLIDE                   MDM      64M presenting with diffuse urticarial rash in the absence of clear allergen.  On arrival hypertensive other vitals are stable reassuring  -No airway concerns  DDx: Allergic urticaria, viral urticaria, vasculitis  Plan basic labs reassess    Labs showed no acute findings.  Will discharge on prednisone, Zyrtec.  Return precautions and follow-up instructions were discussed with patient who voiced understanding and agreement the plan.  All questions were answered to patient satisfaction.    Medical Decision Making      Disposition and Plan     Clinical  Impression:  1. Allergic reaction, initial encounter         Disposition:  Discharge  3/30/2025  5:22 pm    Follow-up:  [facility] Emergency Department  [address]  [facility] [address]  [phone]  Follow up  As needed, If symptoms worsen    Martin Adams MD  [address] [place] [address] [address]  [facility] [address]  [phone]    Follow up            Medications Prescribed:  Discharge Medication List as of 3/30/2025  5:27 PM        START taking these medications    Details   predniSONE 20 MG Oral Tab Take 3 tablets (60 mg total) by mouth daily for 4 days., Normal, Disp-12 tablet, R-0      cetirizine 10 MG Oral Tab Take 1 tablet (10 mg total) by mouth daily., Normal, Disp-30 tablet, R-0                 Supplementary Documentation:

## 2025-04-02 ENCOUNTER — TELEPHONE (OUTPATIENT)
Dept: INTERNAL MEDICINE CLINIC | Facility: CLINIC | Age: 65
End: 2025-04-02

## 2025-04-02 ENCOUNTER — OFFICE VISIT (OUTPATIENT)
Dept: INTERNAL MEDICINE CLINIC | Facility: CLINIC | Age: 65
End: 2025-04-02

## 2025-04-02 VITALS
HEIGHT: 65 IN | BODY MASS INDEX: 31.65 KG/M2 | WEIGHT: 190 LBS | HEART RATE: 80 BPM | SYSTOLIC BLOOD PRESSURE: 144 MMHG | DIASTOLIC BLOOD PRESSURE: 82 MMHG

## 2025-04-02 DIAGNOSIS — T78.40XD ALLERGIC REACTION, SUBSEQUENT ENCOUNTER: ICD-10-CM

## 2025-04-02 DIAGNOSIS — Z12.11 COLON CANCER SCREENING: ICD-10-CM

## 2025-04-02 DIAGNOSIS — Z23 IMMUNIZATION DUE: Primary | ICD-10-CM

## 2025-04-02 DIAGNOSIS — I10 ESSENTIAL HYPERTENSION: ICD-10-CM

## 2025-04-02 DIAGNOSIS — Z76.89 ENCOUNTER TO ESTABLISH CARE WITH NEW DOCTOR: ICD-10-CM

## 2025-04-02 PROCEDURE — 3008F BODY MASS INDEX DOCD: CPT | Performed by: STUDENT IN AN ORGANIZED HEALTH CARE EDUCATION/TRAINING PROGRAM

## 2025-04-02 PROCEDURE — 99214 OFFICE O/P EST MOD 30 MIN: CPT | Performed by: STUDENT IN AN ORGANIZED HEALTH CARE EDUCATION/TRAINING PROGRAM

## 2025-04-02 PROCEDURE — 3077F SYST BP >= 140 MM HG: CPT | Performed by: STUDENT IN AN ORGANIZED HEALTH CARE EDUCATION/TRAINING PROGRAM

## 2025-04-02 PROCEDURE — 3079F DIAST BP 80-89 MM HG: CPT | Performed by: STUDENT IN AN ORGANIZED HEALTH CARE EDUCATION/TRAINING PROGRAM

## 2025-04-02 RX ORDER — DIPHENHYDRAMINE HCL 25 MG
25 TABLET ORAL EVERY 6 HOURS PRN
Qty: 60 TABLET | Refills: 0 | Status: SHIPPED | OUTPATIENT
Start: 2025-04-02

## 2025-04-02 RX ORDER — AMLODIPINE BESYLATE 10 MG/1
10 TABLET ORAL DAILY
Qty: 90 TABLET | Refills: 0 | Status: SHIPPED | OUTPATIENT
Start: 2025-04-02

## 2025-04-02 RX ORDER — PREDNISONE 20 MG/1
40 TABLET ORAL DAILY
Qty: 10 TABLET | Refills: 0 | Status: SHIPPED | OUTPATIENT
Start: 2025-04-02 | End: 2025-04-07

## 2025-04-02 NOTE — PROGRESS NOTES
OFFICE NOTE       The following individual(s) verbally consented to be recorded using ambient AI listening technology and understand that they can each withdraw their consent to this listening technology at any point by asking the clinician to turn off or pause the recording:    Patient name: Karel Ford  Additional names:            Patient ID: Karel Ford is a 64 year old male.  Today's Date: 04/02/25  Chief Complaint: ER F/U (Allergic reaction, rash on stomach) and Establish Care      History of Present Illness  Karel Ford is a 64 year old male with hypertension who presents with a recent allergic reaction.    He experienced a recent allergic reaction of unknown etiology, leading to an emergency room visit on March 30th. His entire body was covered in a rash, which turned purple on his neck, and he had significant swelling in his hands and feet, making ambulation difficult. The symptoms resolved by the following Monday but recurred on Friday. No new foods, medications, or supplements were introduced prior to the reaction. He recently traveled to [place], but symptoms began after his return. He suspects exposure to moldy wood at work might be a trigger, as he works as a crater making wooden pallets. No facial or lip swelling, airway compromise, or new medications. He is currently taking prednisone for the allergic reaction, which has been effective in reducing symptoms. He also uses over-the-counter Benadryl as needed for itching and allergic symptoms.    He has a long-standing history of hypertension and has been taking lisinopril 5 mg daily. He reports a persistent cough, especially at night, which he attributes to lisinopril. His blood pressure remains elevated despite medication, with a recent reading of 144/82 mmHg. He has not been under regular medical care.    He is overdue for colorectal cancer screening and vaccinations, including Prevnar and shingles. No first-degree family history of  colorectal cancer.       Vitals:    04/02/25 1148 04/02/25 1209   BP: 147/79 144/82   Pulse: 80    Weight: 190 lb (86.2 kg)    Height: 5' 5\" (1.651 m)      body mass index is 31.62 kg/m².  BP Readings from Last 3 Encounters:   04/02/25 144/82   03/30/25 (!) 120/94   10/28/23 120/64     The 10-year ASCVD risk score (Lauren NASSAR, et al., 2019) is: 14.3%    Values used to calculate the score:      Age: 64 years      Sex: Male      Is Non- : No      Diabetic: No      Tobacco smoker: No      Systolic Blood Pressure: 144 mmHg      Is BP treated: Yes      HDL Cholesterol: 51 mg/dL      Total Cholesterol: 183 mg/dL  Results           Medications reviewed:  Current Outpatient Medications   Medication Sig Dispense Refill    predniSONE 20 MG Oral Tab Take 2 tablets (40 mg total) by mouth daily for 5 days. TAKE WITH FOOD. 10 tablet 0    diphenhydrAMINE HCl (BENADRYL ALLERGY) 25 MG Oral Tab Take 1 tablet (25 mg total) by mouth every 6 (six) hours as needed for Itching or Allergies. 60 tablet 0    amLODIPine 10 MG Oral Tab Take 1 tablet (10 mg total) by mouth daily. 90 tablet 0    predniSONE 20 MG Oral Tab Take 3 tablets (60 mg total) by mouth daily for 4 days. 12 tablet 0    cetirizine 10 MG Oral Tab Take 1 tablet (10 mg total) by mouth daily. 30 tablet 0         Assessment & Plan    1. Immunization due (Primary)  -     Zoster Recombinant Adjuvanted (Shingrix -Shingles) [35687]  -     Prevnar 20 (PCV20) [10440]  2. Colon cancer screening  -     [facility]  3. Encounter to establish care with new doctor  4. Allergic reaction, subsequent encounter  -     Allergy Referral - Sal Lopez)  -     predniSONE; Take 2 tablets (40 mg total) by mouth daily for 5 days. TAKE WITH FOOD.  Dispense: 10 tablet; Refill: 0  -     diphenhydrAMINE HCl; Take 1 tablet (25 mg total) by mouth every 6 (six) hours as needed for Itching or Allergies.  Dispense: 60 tablet; Refill: 0  5. Essential  hypertension  -     amLODIPine Besylate; Take 1 tablet (10 mg total) by mouth daily.  Dispense: 90 tablet; Refill: 0    Assessment & Plan  Allergic Reaction  Recent allergic reaction with rash, hand and foot swelling, and fever. Possible triggers include mold exposure and lisinopril.  - Refer to allergist for evaluation.  - Advise taking pictures of rash during episodes.  - Prescribe Benadryl for acute episodes.  - Provide prednisone 40 mg for 5 days for emergency use.  - Discontinue lisinopril.    Hypertension  Hypertension with recent /82 mmHg. Lisinopril discontinued. Initiated amlodipine 10 mg with monitoring for leg swelling.  - Prescribe amlodipine 10 mg daily.  - Instruct to monitor BP at home, especially in the morning.  - Follow up in one month to assess BP control.    Colorectal Cancer Screening  Overdue for screening. Prefers noninvasive approach. No first-degree family history.  - Order Cologuard test.    Vaccinations  Due for Prevnar and shingles vaccines. Deferred until allergic symptoms resolve.  - Administer Prevnar and shingles vaccines after resolution of allergic symptoms.    Follow-up  Follow-up needed for BP monitoring and annual physical.  - Schedule follow-up in one month for annual physical and BP evaluation.       Follow Up: As needed/if symptoms worsen or Return in about 4 weeks (around 4/30/2025) for Annual Physical ..         Objective/ Results:   Physical Exam  Constitutional:       Appearance: He is well-developed.   Cardiovascular:      Rate and Rhythm: Normal rate and regular rhythm.      Heart sounds: Normal heart sounds.   Pulmonary:      Effort: Pulmonary effort is normal.      Breath sounds: Normal breath sounds.   Abdominal:      General: Bowel sounds are normal.      Palpations: Abdomen is soft.   Skin:     General: Skin is warm and dry.      Findings: Erythema present.   Neurological:      Mental Status: He is alert and oriented to person, place, and time.      Deep  Tendon Reflexes: Reflexes are normal and symmetric.        Physical Exam  VITALS: BP- 144/82     Reviewed:    There are no active problems to display for this patient.   Allergies[1]     Social History     Socioeconomic History    Marital status:    Tobacco Use    Smoking status: Former     Current packs/day: 0.00     Types: Cigarettes     Quit date: 2008     Years since quittin.2     Passive exposure: Past    Smokeless tobacco: Former   Vaping Use    Vaping status: Never Used   Substance and Sexual Activity    Alcohol use: Yes     Alcohol/week: 3.0 standard drinks of alcohol     Types: 3 Cans of beer per week     Comment: maybe 3 beers a day    Drug use: Never      Review of Systems   Constitutional: Negative.    Respiratory: Negative.     Cardiovascular: Negative.    Gastrointestinal: Negative.    Skin:  Positive for rash.   Neurological: Negative.        All other systems negative unless otherwise stated in ROS or HPI above.       Martin Adams MD  Internal Medicine       Call office with any questions or seek emergency care if necessary.   Patient understands and agrees to follow directions and advice.      ----------------------------------------- PATIENT INSTRUCTIONS-----------------------------------------     Patient Instructions   Controlling High Blood Pressure   High blood pressure (hypertension) is often called the silent killer. This is because many people who have it, don’t know it. It can be very dangerous. High blood pressure can raise your risk of heart attack, stroke, heart disease, and heart failure. Controlling your blood pressure can lower your risk of these problems. It's important to check yourblood pressure regularly. It can save your life.   Blood pressure measurements are given as 2 numbers. Systolic blood pressure is the upper number. This is the pressure when the heart contracts. Diastolic blood pressure is the lower number. This is the pressure when the heartrelaxes  between beats.   Blood pressure is grouped like this:   Normal blood pressure. This is systolic of less than 120 and diastolic of less than 80 (120/80).  Elevated blood pressure.  This is systolic of 120 to 129 and diastolic less than 80.  Stage 1 high blood pressure.  This is systolic of 130 to 139 or diastolic between 80 to 89.  Stage 2 high blood pressure.  This is systolic of 140 or higher or diastolic of 90 or higher.  A heart-healthy lifestyle can help you control your blood pressure withoutmedicines. Below are some things you can do to have a heart-healthy lifestyle.     Eat heart-healthy foods   Choose low-salt, low-fat foods. Limit your sodium to 2,300 mg per day or the amount advised by your healthcare provider.  Limit canned, dried, cured, packaged, and fast foods. These can contain a lot of salt.  Eat 8 to 10 servings of fruits and vegetables every day.  Choose lean meats, fish, or chicken.  Eat whole-grain pasta, brown rice, and beans.  Eat 2 to 3 servings of low-fat or fat-free dairy products.  Ask your doctor about the DASH eating plan. This plan helps reduce blood pressure.  When you go to a restaurant, ask that your meal be made with no added salt.    Stay at a healthy weight   Ask your healthcare provider how many calories to eat a day. Then stick to that number.  Ask your provider what weight range is healthiest for you. If you are overweight, a weight loss of only 3% to 5% of your body weight can help lower blood pressure. A good weight loss goal is to lose 10% of your body weight in a year.  Limit snacks and sweets.  Get regular exercise.    Get more active   Find activities you enjoy. They can be done alone or with friends or family. Try bicycling, dancing, walking, or jogging.  Park farther away from building entrances to walk more.  Use stairs instead of the elevator.  When you can, walk or bike instead of driving.  [place] leaves, garden, or do household repairs.  Be active at a moderate to  vigorous level of physical activity for at least 30 minutes a day for at least 5 days a week.     Manage stress   Make time to relax and enjoy life. Find time to laugh.  Talk about your concerns with your loved ones and your healthcare provider.  Visit with family and friends, and keep up with hobbies.    Limit alcohol and quit smoking   Men should have no more than 2 drinks per day.  Women should have no more than 1 drink per day.  If you smoke, make a plan to stop. Talk with your healthcare provider for help. Smoking greatly raises your risk for heart disease and stroke. Ask your provider about stop-smoking programs and other support.    Blood pressure medicines  If your lifestyle changes aren’t enough, your healthcare provider may prescribe high blood pressure medicine. Take all medicines as prescribed. If you have any questions about yourmedicines, ask your provider before stopping or changing them.   [employer] last reviewed this educational content on12/1/2021    © 3941-0770 [employer]. All rights reserved. This information is not intended as a substitute for professional medical care. Always follow yourhealthcare professional's instruction    Video [employer]  What is High Blood Pressure?  Understand what blood pressure is, the health risks of having high blood pressure, the factors that put you at risk for having high blood pressure, and the importance of working with your healthcare provider to control it.  To watch the video:  Scan the QR code  Using your mobile device, scan the following code:  OR  Go to the website:  [employer]  Enter the prescription code:  3414E    © 4490-3979 [employer]. All rights reserved. This information is not intended as a substitute for professional medical care. Always follow your healthcare professional's instructions.    Video [employer]  Eating Well with High Blood Pressure  Certain foods can make your blood pressure go  too high. Watch and learn how easy it is to have delicious meals without harming your health.     To watch the video:  Scan the QR code  Using your mobile device, scan the following code:  OR  Go to the website:  www.[employer]  Enter the prescription code:   JEAN CARLOS       © 1437-4156 [employer]. All rights reserved. This information is not intended as a substitute for professional medical care. Always follow your healthcare professional's instructions.    Taking Your Blood Pressure  Blood pressure is the force of blood against the artery wall as it moves from the heart through the blood vessels. You can take your own blood pressure reading using a digital monitor. Take your readings the same each time, usingthe same arm. Take readings as often as your healthcare provider advises.   About blood pressure monitors  Blood pressure monitors are designed for certain ages and cases. You can find monitors for older adults, for pregnant women, and for children. Make sure theone you choose is the right one for your age and situation.   Experts advise an automatic cuff monitor that fits on your upper arm (bicep). The cuff should fit your arm size. A cuff that’s too large or too small won't give an accurate reading. Measure around yourupper arm to find your size.   Monitors that attach to your finger or wrist are not as accurate as monitorsfor your upper arm.   Ask your healthcare provider for help in choosing a monitor. Bring your monitorto your next provider visit if you need help in using it the correct way.   The steps below are general instructions for using an automatic digitalmonitor.   Step 1. Relax    Take your blood pressure at the same time every day, such as in the morning or evening. Or take it at the time your healthcare provider advises.  Wait at least 30 minutes after smoking, eating, or exercising. Don't drink coffee, tea, soda, or other caffeinated drinks before checking your blood pressure.  Use the restroom beforehand.  Sit comfortably at a table with both feet on the floor. Don't cross your legs or feet. Place the monitor near you.  Rest for at least 5 minutes before you begin. Make sure there are no distractions. This includes TV, cell phones, and other electronics. Wait to have conversations with others until after you measure you blood pressure.  Step 2. Wrap the cuff    Place your arm on the table, palm up. Your arm should be at the level of your heart. Wrap the cuff around your upper arm, just above your elbow. It’s best done on bare skin, not over clothing. Most cuffs will show you where the blood vessel in the middle of the arm at the inner side of the elbow (the brachial artery) should line up with the cuff. Look in your monitor's instruction booklet for an illustration. You can also bring your cuff to your healthcare provider and have them show you how to correctly place the cuff.  Step 3. Inflate the cuff    Push the button that starts the pump.  The cuff will tighten, then loosen.  The numbers will change. When they stop changing, your blood pressure reading will appear.  Take 2 or 3 readings 1 minute apart, or as advised by your provider.  Step 4. Write down the results of each reading    Write down your blood pressure numbers for each reading. Note the date and time. Keep your results in 1 place, such as a notebook. Even if your monitor has a built-in memory, keep a hard copy of the readings.  Remove the cuff from your arm. Turn off the machine.  Bring your blood pressure records with you to each provider visit.  If you start a new blood pressure medicine, note the day you started the new medicine. Also note the day if you change the dose of your medicine. Measure your blood pressure before your take your medicine. This information goes on your blood pressure recording sheet. This will help your provider check how well the medicine changes are working.  Ask your provider what numbers mean  that you should call them. Also ask what numbers mean that you should get help right away.  [employer] last reviewed this educational content on12/1/2021 © 2000-2022 [employer]. All rights reserved. This information is not intended as a substitute for professional medical care. Always follow yourhealthcare professional's instructions.                     The 21st Century Cures Act makes medical notes available to patients in the interest of transparency.  However, please be advised that this is a medical document.  It is intended as a peer to peer communication.  It is written in medical language and may contain abbreviations or verbiage that are technical and unfamiliar.  It may appear blunt or direct.  Medical documents are intended to carry relevant information, facts as evident, and the clinical opinion of the practitioner.          [1]   Allergies  Allergen Reactions    Lisinopril RASH

## 2025-04-02 NOTE — PATIENT INSTRUCTIONS
Controlling High Blood Pressure   High blood pressure (hypertension) is often called the silent killer. This is because many people who have it, don’t know it. It can be very dangerous. High blood pressure can raise your risk of heart attack, stroke, heart disease, and heart failure. Controlling your blood pressure can lower your risk of these problems. It's important to check yourblood pressure regularly. It can save your life.   Blood pressure measurements are given as 2 numbers. Systolic blood pressure is the upper number. This is the pressure when the heart contracts. Diastolic blood pressure is the lower number. This is the pressure when the heartrelaxes between beats.   Blood pressure is grouped like this:   Normal blood pressure. This is systolic of less than 120 and diastolic of less than 80 (120/80).  Elevated blood pressure.  This is systolic of 120 to 129 and diastolic less than 80.  Stage 1 high blood pressure.  This is systolic of 130 to 139 or diastolic between 80 to 89.  Stage 2 high blood pressure.  This is systolic of 140 or higher or diastolic of 90 or higher.  A heart-healthy lifestyle can help you control your blood pressure withoutmedicines. Below are some things you can do to have a heart-healthy lifestyle.     Eat heart-healthy foods   Choose low-salt, low-fat foods. Limit your sodium to 2,300 mg per day or the amount advised by your healthcare provider.  Limit canned, dried, cured, packaged, and fast foods. These can contain a lot of salt.  Eat 8 to 10 servings of fruits and vegetables every day.  Choose lean meats, fish, or chicken.  Eat whole-grain pasta, brown rice, and beans.  Eat 2 to 3 servings of low-fat or fat-free dairy products.  Ask your doctor about the DASH eating plan. This plan helps reduce blood pressure.  When you go to a restaurant, ask that your meal be made with no added salt.    Stay at a healthy weight   Ask your healthcare provider how many calories to eat a day. Then  stick to that number.  Ask your provider what weight range is healthiest for you. If you are overweight, a weight loss of only 3% to 5% of your body weight can help lower blood pressure. A good weight loss goal is to lose 10% of your body weight in a year.  Limit snacks and sweets.  Get regular exercise.    Get more active   Find activities you enjoy. They can be done alone or with friends or family. Try bicycling, dancing, walking, or jogging.  Park farther away from building entrances to walk more.  Use stairs instead of the elevator.  When you can, walk or bike instead of driving.  [place] leaves, garden, or do household repairs.  Be active at a moderate to vigorous level of physical activity for at least 30 minutes a day for at least 5 days a week.     Manage stress   Make time to relax and enjoy life. Find time to laugh.  Talk about your concerns with your loved ones and your healthcare provider.  Visit with family and friends, and keep up with hobbies.    Limit alcohol and quit smoking   Men should have no more than 2 drinks per day.  Women should have no more than 1 drink per day.  If you smoke, make a plan to stop. Talk with your healthcare provider for help. Smoking greatly raises your risk for heart disease and stroke. Ask your provider about stop-smoking programs and other support.    Blood pressure medicines  If your lifestyle changes aren’t enough, your healthcare provider may prescribe high blood pressure medicine. Take all medicines as prescribed. If you have any questions about yourmedicines, ask your provider before stopping or changing them.   Notice Kiosk last reviewed this educational content on12/1/2021 © 2000-2022 [employer]. All rights reserved. This information is not intended as a substitute for professional medical care. Always follow yourTri[facility] Hospitalcare professional's instruction    Video HealthSheets™  What is High Blood Pressure?  Understand what blood pressure is, the health risks  of having high blood pressure, the factors that put you at risk for having high blood pressure, and the importance of working with your healthcare provider to control it.  To watch the video:  Scan the QR code  Using your mobile device, scan the following code:  OR  Go to the website:  [employer]  Enter the prescription code:  3414E    © 2000-2022 [employer]. All rights reserved. This information is not intended as a substitute for professional medical care. Always follow your healthcare professional's instructions.    Video SeeJay  Eating Well with High Blood Pressure  Certain foods can make your blood pressure go too high. Watch and learn how easy it is to have delicious meals without harming your health.     To watch the video:  Scan the QR code  Using your mobile device, scan the following code:  OR  Go to the website:  www.kramesvideo.com  Enter the prescription code:   QIX       © 2000-2022 [employer]. All rights reserved. This information is not intended as a substitute for professional medical care. Always follow your healthcare professional's instructions.    Taking Your Blood Pressure  Blood pressure is the force of blood against the artery wall as it moves from the heart through the blood vessels. You can take your own blood pressure reading using a digital monitor. Take your readings the same each time, usingthe same arm. Take readings as often as your healthcare provider advises.   About blood pressure monitors  Blood pressure monitors are designed for certain ages and cases. You can find monitors for older adults, for pregnant women, and for children. Make sure theone you choose is the right one for your age and situation.   Experts advise an automatic cuff monitor that fits on your upper arm (bicep). The cuff should fit your arm size. A cuff that’s too large or too small won't give an accurate reading. Measure around yourupper arm to find your  size.   Monitors that attach to your finger or wrist are not as accurate as monitorsfor your upper arm.   Ask your healthcare provider for help in choosing a monitor. Bring your monitorto your next provider visit if you need help in using it the correct way.   The steps below are general instructions for using an automatic digitalmonitor.   Step 1. Relax    Take your blood pressure at the same time every day, such as in the morning or evening. Or take it at the time your healthcare provider advises.  Wait at least 30 minutes after smoking, eating, or exercising. Don't drink coffee, tea, soda, or other caffeinated drinks before checking your blood pressure. Use the restroom beforehand.  Sit comfortably at a table with both feet on the floor. Don't cross your legs or feet. Place the monitor near you.  Rest for at least 5 minutes before you begin. Make sure there are no distractions. This includes TV, cell phones, and other electronics. Wait to have conversations with others until after you measure you blood pressure.  Step 2. Wrap the cuff    Place your arm on the table, palm up. Your arm should be at the level of your heart. Wrap the cuff around your upper arm, just above your elbow. It’s best done on bare skin, not over clothing. Most cuffs will show you where the blood vessel in the middle of the arm at the inner side of the elbow (the brachial artery) should line up with the cuff. Look in your monitor's instruction booklet for an illustration. You can also bring your cuff to your healthcare provider and have them show you how to correctly place the cuff.  Step 3. Inflate the cuff    Push the button that starts the pump.  The cuff will tighten, then loosen.  The numbers will change. When they stop changing, your blood pressure reading will appear.  Take 2 or 3 readings 1 minute apart, or as advised by your provider.  Step 4. Write down the results of each reading    Write down your blood pressure numbers for each  reading. Note the date and time. Keep your results in 1 place, such as a notebook. Even if your monitor has a built-in memory, keep a hard copy of the readings.  Remove the cuff from your arm. Turn off the machine.  Bring your blood pressure records with you to each provider visit.  If you start a new blood pressure medicine, note the day you started the new medicine. Also note the day if you change the dose of your medicine. Measure your blood pressure before your take your medicine. This information goes on your blood pressure recording sheet. This will help your provider check how well the medicine changes are working.  Ask your provider what numbers mean that you should call them. Also ask what numbers mean that you should get help right away.  Kala last reviewed this educational content on12/1/2021 © 2000-2022 [employer]. All rights reserved. This information is not intended as a substitute for professional medical care. Always follow yourhealthcare professional's instructions.

## 2025-04-02 NOTE — TELEPHONE ENCOUNTER
----- Message from Akbar Black MD sent at 4/17/2017  8:50 PM CDT -----  Please notify patient that his/her cholesterol levels were slight elevated.  However, it only requires to follow a low cholesterol / low fat diet, exercise and recheck in 12 month
----- Message from Sabrina Lovell MD sent at 4/19/2017  3:18 PM CDT -----  Please notify patient that his/her blood test showed low levels of vitamin D. A prescription was sent to his pharmacy to take one capsule or tablet, once a week.  This Rx is good
Patient was left a message to call back. Transfer to  first, then [phone] if I'm unavailable.       rx for vit D 50,000 sent 4/19/17
Pt is calling back
Spoke with patient (verified name and ), reviewed information, patient verbalized understanding and agrees with plan.    Low cholesterol/low fat diet information along with exercise program information mailed to pt at his request.  Pt has already picked
needs device

## 2025-04-02 NOTE — TELEPHONE ENCOUNTER
Spoke with pt in office name and  verified  Cologuard order and patients demographics faxed to [facility] at [phone]

## 2025-04-13 LAB — AMB EXT COLOGUARD RESULT: NEGATIVE

## 2025-04-30 ENCOUNTER — TELEPHONE (OUTPATIENT)
Dept: INTERNAL MEDICINE CLINIC | Facility: CLINIC | Age: 65
End: 2025-04-30

## 2025-04-30 NOTE — TELEPHONE ENCOUNTER
Results received from [facility] for cologuard- results negative- results in chart and sent to scan.

## 2025-05-05 ENCOUNTER — OFFICE VISIT (OUTPATIENT)
Dept: INTERNAL MEDICINE CLINIC | Facility: CLINIC | Age: 65
End: 2025-05-05
Payer: COMMERCIAL

## 2025-05-05 VITALS
SYSTOLIC BLOOD PRESSURE: 132 MMHG | HEIGHT: 65 IN | BODY MASS INDEX: 31.26 KG/M2 | HEART RATE: 77 BPM | DIASTOLIC BLOOD PRESSURE: 82 MMHG | WEIGHT: 187.63 LBS

## 2025-05-05 DIAGNOSIS — Z13.6 ENCOUNTER FOR SCREENING FOR CORONARY ARTERY DISEASE: ICD-10-CM

## 2025-05-05 DIAGNOSIS — Z23 NEED FOR VACCINATION: ICD-10-CM

## 2025-05-05 DIAGNOSIS — Z00.00 ANNUAL PHYSICAL EXAM: Primary | ICD-10-CM

## 2025-05-05 DIAGNOSIS — E55.9 VITAMIN D DEFICIENCY: ICD-10-CM

## 2025-05-05 DIAGNOSIS — Z12.5 SCREENING FOR PROSTATE CANCER: ICD-10-CM

## 2025-05-05 DIAGNOSIS — H90.3 BILATERAL SENSORINEURAL HEARING LOSS: ICD-10-CM

## 2025-05-05 DIAGNOSIS — I10 ESSENTIAL HYPERTENSION: ICD-10-CM

## 2025-05-05 DIAGNOSIS — Z23 NEED FOR PNEUMOCOCCAL VACCINE: ICD-10-CM

## 2025-05-05 RX ORDER — ZOSTER VACCINE RECOMBINANT, ADJUVANTED 50 MCG/0.5
1 KIT INTRAMUSCULAR
Qty: 1 EACH | Refills: 1 | Status: SHIPPED | OUTPATIENT
Start: 2025-05-05 | End: 2025-07-04

## 2025-05-05 RX ORDER — AMLODIPINE BESYLATE 10 MG/1
10 TABLET ORAL DAILY
Qty: 90 TABLET | Refills: 1 | Status: SHIPPED | OUTPATIENT
Start: 2025-05-05

## 2025-05-05 NOTE — PATIENT INSTRUCTIONS
Cómo controlar la presión arterial kvng  La presión arterial kvng (hipertensión) se conoce también bc el asesino silencioso. Se la llama así porque muchas personas la tienen sin saberlo. Puede ser muy peligrosa. La presión arterial kvng puede aumentar el riesgo de ataques al corazón, derrames cerebrales, enfermedades del corazón o insuficiencia cardíaca. Controlar la presión arterial puede disminuir el riesgo de tener estos problemas. Es importantecontrolar la presión arterial de manera periódica. [place] puede salvarle la bar.   Las mediciones de la presión arterial se indican con 2 números. La presión arterial sistólica es el número superior. Es la presión cuando el corazón se contrae. La presión arterial diastólica es el número inferior. Es la presión cuando el corazón se relaja entrelatidos.   La presión arterial se agrupa de la siguiente manera:  Presión arterial normal. Se llama de esta manera cuando la presión sistólica es mark que 120 y la diastólica es mark que 80 (120/80).  Presión arterial elevada. Se llama de esta manera cuando la presión sistólica es de 120 a 129 y la diastólica es mark que 80.  Presión arterial kvng de etapa 1. La presión sistólica está entre 130 y 139 o la diastólica está entre 80 y 89.  Presión arterial kvng de etapa 2. La presión sistólica es 140 o superior o la diastólica es 90 o superior.  Un estilo de bar saludable para el corazón puede ayudarlo a controlar moody presión arterial sin tener que nikita medicamentos. Más abajo encontrará algunasrecomendaciones a fin de llevar un estilo de bar saludable para el corazón.     Coma alimentos saludables para el corazón  Elija alimentos con bajo contenido de sodio y grasas. Limite la ingesta de sodio a 2,300 mg diarios o según la cantidad que le indique el proveedor de atención médica.  Limite el consumo de comidas enlatadas, secas, curadas, envasadas y las comidas rápidas. Pueden contener mucha sal.  Consuma entre ocho y christina porciones  de frutas y verduras todos los almar.  Elija cedric de res magras, pescado o sulema.  Coma pasta y arroz integrales y frijoles.  Consuma entre dos y laurent porciones de productos lácteos descremados o bajos en grasa.  Consulte con moody médico acerca del plan de alimentación DASH. Ramya plan ayuda a reducir la presión arterial.  Cuando coma en un restaurante, pida que le preparen los platos sin sal.    Mantenga un peso saludable  Pregunte a moody proveedor de atención médica cuántas calorías puede consumir por día. Respete jeremias cantidad.  Pregunte al proveedor cuáles son los límites de peso más adecuados para usted. Si tiene sobrepeso, perder entre un 3 % y un 5 % de moody peso corporal puede ayudarlo a disminuir moody presión arterial. Un buen objetivo es perder un 10 % de moody peso corporal en un año.  Limite el consumo de tentempiés y dulces.  Rebecca ejercicio con regularidad.    Rebecca más actividad física  Busque actividades que le gusten. Puede hacerlas de forma individual, o con amigos o moody uche. Pruebe andar en bicicleta, bailar, caminar o trotar.  Estacione lejos de las entradas de los edificios para caminar más.  Use las escaleras en lugar del ascensor.  Cuando pueda, camine o vaya en bicicleta en vez de ir en automóvil.  [place] hojas, trabaje en moody jardín o rebecca reparaciones en la casa.  Rebecca actividad física de moderada a intensa jarek, al menos, 30 minutos por día, un mínimo de orquidea días a la semana.     Controle el estrés  Tómese tiempo para relajarse y disfrutar de la bar. Encuentre tiempo para reírse.  Comparta fred preocupaciones con fred seres queridos y con el proveedor de atención médica.  Visite a fred familiares y amigos, y mantenga fred pasatiempos favoritos.    Limite el alcohol y deje de fumar  Los hombres no deberían nikita más de dos bebidas alcohólicas por día.  Las mujeres no deberían nikita más de eladio bebida alcohólica por día.  Si fuma, elabore un plan para dejar de hacerlo. Pídale ayuda a moody proveedor de  atención médica. Si fuma mucho, aumenta el riesgo de padecer enfermedades del corazón y derrames cerebrales. Consulte con el proveedor sobre programas para dejar de fumar y otros recursos de apoyo.    Medicamentos para la presión arterial  Si los cambios en el estilo de bar no son suficientes, el proveedor de atención médica puede recetarle medicamentos para la presión arterial haritha. [place] todos los medicamentos según le indiquen. Si tiene alguna pregunta sobre los medicamentos, consulte al proveedor antes de dejar de tomarlos ocambiarlos.     © 2000-2022 [employer]. Todos los derechos reservados. Esta información no pretende sustituir la atención médica profesional. Sólo sumédico puede diagnosticar y tratar un problema de amanda.    Video [employer]road  ¿Qué es Presión Arterial Haritha?  Entender que es Presión Arterial, los riesgos en la amanda al tener Presión Arterial Haritha, los factores que lo ponen a usted en riesgo de tener Presión Arterial [place] y la importancia de trabajar con moody proveedor del cuidado de la amanda para controlarla.  Para daylin el video:  Escanee el código code  Utilizando moody dispositivo móvil, escanee el siguiente código:  OR  Jesus al sitio web:  ViewReple.com  Ingrese el código de receta:  3414S    © 2000-2022 [employer]. All rights reserved. This information is not intended as a substitute for professional medical care. Always follow your healthcare professional's instructions.    Video [employer]abroad  Carlin john con presión arterial elevada  Ciertos alimentos pueden hacer que moody presión suba demasiado. Remi y aprenda lo fácil que es disfrutar de comidas deliciosas sin dañar moody amanda.     Para daylin el video:  Escanee el código QR  Utilizando moody dispositivo móvil, escanee el siguiente código:  DENISE Lee al [employer] Technology[employer] web:  www.Corso12  Ingrese el código de receta:   DPX    © 8873-7340 [employer]. All rights reserved. This information is not  intended as a substitute for professional medical care. Always follow your healthcare professional's instructions.    Cómo tomarse la presión arterial  La presión arterial es la fuerza que la zuleyma ejerce contra las plummer de los vasos al desplazarse por ellos. Usted puede tomarse moody propia presión arterial con un medidor digital. Tómese la presión arterial a la misma hora y en elmismo brazo, tan a menudo bc le indique moody proveedor de atención médica.   Acerca de los monitores de presión arterial   Los monitores de presión arterial están diseñados para diferentes edades y diferentes casos. Usted puede encontrar monitores para adultos mayores, para mujeres embarazadas y para niños. Asegúrese de que el que escoja es el adecuadopara moody edad y situación.   La American Heart Association recomienda un monitor automatizado con manguito que se adapte al tamaño de moody brazo (biceps). Moody brazo debe caber john dentro del manguito, ya que si es muy neil o muy pequeño no suministrará eladio lectura exacta. Mídase el grosor de moody barazo paraencontrar la talla correcta para usted.   Los monitores que se ahieren a un dedo o a la demetrius no son tan precisos comolos que se adhieren a moody brazo.   Pídale a moody proveedor de atención médica que le ayude a escoger un monitor. Traiga el monitor a moody próxima asuncion médica si necesita ayuda para aprender ausarlo correctamente.   Los pasos que se dan a continuación son instrucciones generales para usar unmonitor digital automatizado.     1. Relájese  Tómese la presión arterial a la misma hora todos los días, bc temprano en la mañana o al final de la tarde.  Espere al menos eladio hora después de trinh fumado, comido o hecho ejercicio. No terrence café, té, soda u otras bebidas cafeínadas antes de tomarse la presión.  Siéntese cómodamente a la russo. Coloque el medidor digital cerca de usted.  Descanse jarek algunos minutos antes de empezar.    2. Coloque el manguito  Ponga el brazo sobre la russo,  con la godoy de la mano hacia arriba. El brazo debe quedar a la altura del corazón. Ajuste el manguito alrededor del brazo, ambika por encima del codo. Es mejor colocarlo sobre la piel desnuda, sin ropa. La mayoría de los manguitos le indicarán donde debe alinearlo con la arteria braquial (el vaso sanguíneo en la mitad del brazo en la parte interna del codo). Arianna las instrucciones que vienen con moody monitor para daylin eladio ilustración. También puede llevar moody manguito a la asuncion con moody proveedor de atención médica para que le muestren cómo usarlo correctamente.    3. Infle el manguito  Oprima el botón para iniciar el bombeo automático.  El manguito sherine se aprieta y luego se afloja.  Los números cambian. Cuando paran de cambiar, el medidor indicará moody presión arterial.  Tómese 2 o 3 lecturas con un minuto de diferencia entre eladio y otra.      4. Anote los resultados  [place] nota de los números de moody presión arterial, así bc de la fecha y la hora de la medición. Mantenga los resultados en un solo lugar, por ejemplo en un cuaderno. Incluso si moody monitor tiene eladio memoria incorporada, mantenga eladio copia por escrito de los resultados.  Quite el manguito del brazo y apague la máquina.  Traiga los resultados de fred lecturas a cada eladio de las citas con moody proveedor de atención médica. También anote el días si cambió la dosis de moody medicamento. Fransico inforamación debe incuirse en los registros que rebecca de moody presión arterial para que le ayude a moody proveedor de atención médica a evaluar qué tan john están funcionando los cambios en el medicamento.  Pregúntele a moody proveedor de atención médica a qué niveles debe obtener ayuda de inmediato.      © 6764-6029 [employer]. Todos los derechos reservados. Esta información no pretende sustituir la atención médica profesional. Sólo sumédico puede diagnosticar y tratar un problema de amanda.

## 2025-05-06 NOTE — PROGRESS NOTES
OFFICE NOTE       The following individual(s) verbally consented to be recorded using ambient AI listening technology and understand that they can each withdraw their consent to this listening technology at any point by asking the clinician to turn off or pause the recording:    Patient name: Karel Ford  Additional names:           Patient ID: Karel Ford is a 64 year old male.  Today's Date: 05/05/25  Chief Complaint: Routine Physical      History of Present Illness  Karel Ford is a 64 year old male with hypertension and hyperlipidemia who presents for a routine physical examination. He is accompanied by his partner, who has been with him for over thirty years.    He has a history of hypertension and hyperlipidemia. His blood pressure was previously elevated, but at home, the highest reading in the past month was 138 mmHg, and the most recent reading was 127/70 mmHg. He is currently taking amlodipine 10 mg daily. No chest pain, and a normal baseline EKG from September 13, 2023, showing normal sinus rhythm. He has never had a calcium CT scan for atherosclerosis evaluation.    Regarding urinary symptoms, he does not wake up more than twice at night to urinate and feels relieved after urination. He has no issues with incomplete bladder emptying.    He has a history of exposure to loud noises at work, which may have contributed to hearing loss. He has difficulty hearing, especially in public settings, and sometimes speaks loudly on the phone. He uses foam earplugs at work but not more protective hearing devices. His partner mentions that he is losing his hearing, and he sometimes does not hear whispers. 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    He consumes a lot of hot peppers, sometimes eating five or six at a time, which may contribute to his blood pressure issues. He does not consume a lot of salt otherwise.       Vitals:     05/05/25 1742 05/05/25 1811   BP: 150/70 132/82   Pulse: 77    Weight: 187 lb 9.6 oz (85.1 kg)    Height: 5' 5\" (1.651 m)      body mass index is 31.22 kg/m².  BP Readings from Last 3 Encounters:   05/05/25 132/82   04/02/25 144/82   03/30/25 (!) 120/94     The 10-year ASCVD risk score (Lauren NASSAR, et al., 2019) is: 12.4%    Values used to calculate the score:      Age: 64 years      Sex: Male      Is Non- : No      Diabetic: No      Tobacco smoker: No      Systolic Blood Pressure: 132 mmHg      Is BP treated: Yes      HDL Cholesterol: 51 mg/dL      Total Cholesterol: 183 mg/dL  Results  DIAGNOSTIC  EKG: Normal sinus rhythm (09/13/2023)       Medications reviewed:  Current Medications[1]      Assessment & Plan    1. Annual physical exam (Primary)  -     CT CALCIUM SCORING; Future; Expected date: 05/05/2025  -     PSA Total, Screen; Future; Expected date: 05/05/2025  -     Lipid Panel; Future; Expected date: 05/05/2025  -     TSH W Reflex To Free T4; Future; Expected date: 05/05/2025  -     Hemoglobin A1C; Future; Expected date: 05/05/2025  -     Comp Metabolic Panel (14); Future; Expected date: 05/05/2025  -     CBC With Differential With Platelet; Future; Expected date: 05/05/2025  -     Vitamin D; Future; Expected date: 05/05/2025  2. Essential hypertension  -     Microalb/Creat Ratio, Random Urine; Future; Expected date: 05/05/2025  -     amLODIPine Besylate; Take 1 tablet (10 mg total) by mouth daily.  Dispense: 90 tablet; Refill: 1  3. Encounter for screening for coronary artery disease  -     CT CALCIUM SCORING; Future; Expected date: 05/05/2025  4. Screening for prostate cancer  -     PSA Total, Screen; Future; Expected date: 05/05/2025  5. Vitamin D deficiency  -     Vitamin D; Future; Expected date: 05/05/2025  6. Need for pneumococcal vaccine  -     Prevnar 20 (PCV20) [65709]  7. Need for vaccination  -     Shingrix; Inject 1 each into the muscle Every 2 (two) months.  Dispense: 1  each; Refill: 1  -     ZOSTER VACC RECOMBINANT IM NJX  8. Bilateral sensorineural hearing loss  -     ENT Referral - In Network    Assessment & Plan  Wellness Visit  Discussed blood pressure management, preventive screenings, PSA testing, and calcium CT scan. Recommended pneumococcal and shingles vaccinations.  - Order CBC, CMP, A1c, TSH, lipid panel, PSA, and microalbumin/creatinine ratio.  - Check vitamin D level and start supplementation if needed.  - Discuss calcium CT scan for atherosclerosis evaluation.  - Administer pneumococcal and shingles vaccines.  - Refer to ENT for hearing evaluation and audiometry testing.    Essential hypertension  Blood pressure improved with home readings, highest at 138/81 mmHg. Continued amlodipine 10 mg daily. Discussed lifestyle modifications. Advised morning blood pressure monitoring.  - Continue amlodipine 10 mg daily.  - Advise reducing salt and pepper intake.  - Monitor blood pressure at home, especially in the morning.  - Consider adjusting medication if blood pressure remains above 130/80 mmHg.    Hearing loss  Reported hearing loss likely due to age and occupational noise exposure. Referral to ENT for further evaluation.  - Refer to ENT for hearing evaluation and audiometry testing.    Allergic reaction to lisinopril  Previous allergic reaction to lisinopril. Off Benadryl. Plan to evaluate potential reintroduction of antihypertensive medication after allergist consultation.  - Follow up with allergist on May 20, 2025.       Follow Up: As needed/if symptoms worsen or Return in about 12 weeks (around 7/28/2025) for Blood pressure follow Up and second shingles vaccine. ..   Objective/ Results:   Physical Exam  Constitutional:       Appearance: He is well-developed.   Cardiovascular:      Rate and Rhythm: Normal rate and regular rhythm.      Heart sounds: Normal heart sounds.   Pulmonary:      Effort: Pulmonary effort is normal.      Breath sounds: Normal breath sounds.    Abdominal:      General: Bowel sounds are normal.      Palpations: Abdomen is soft.   Skin:     General: Skin is warm and dry.   Neurological:      Mental Status: He is alert and oriented to person, place, and time.      Deep Tendon Reflexes: Reflexes are normal and symmetric.        Physical Exam  VITALS: BP- 132/80  HEENT: Inner ear problem.  CARDIOVASCULAR: Heart normal.     Reviewed:    There are no active problems to display for this patient.     Allergies[2]   Short Social Hx on File[3]   Review of Systems   Constitutional: Negative.    HENT:  Positive for hearing loss.    Respiratory: Negative.     Cardiovascular: Negative.    Gastrointestinal: Negative.    Skin: Negative.    Neurological: Negative.        All other systems negative unless otherwise stated in ROS or HPI above.       Martin Adams MD  Internal Medicine       Call office with any questions or seek emergency care if necessary.   Patient understands and agrees to follow directions and advice.      ----------------------------------------- PATIENT INSTRUCTIONS-----------------------------------------     Patient Instructions   Cómo controlar la presión arterial kvng  La presión arterial kvng (hipertensión) se conoce también bc el asesino silencioso. Se la llama así porque muchas personas la tienen sin saberlo. Puede ser muy peligrosa. La presión arterial kvng puede aumentar el riesgo de ataques al corazón, derrames cerebrales, enfermedades del corazón o insuficiencia cardíaca. Controlar la presión arterial puede disminuir el riesgo de tener estos problemas. Es importantecontrolar la presión arterial de manera periódica. [place] puede salvarle la bar.   Las mediciones de la presión arterial se indican con 2 números. La presión arterial sistólica es el número superior. Es la presión cuando el corazón se contrae. La presión arterial diastólica es el número inferior. Es la presión cuando el corazón se relaja entrelatidos.   La presión arterial se  agrupa de la siguiente manera:  Presión arterial normal. Se llama de esta manera cuando la presión sistólica es mark que 120 y la diastólica es mark que 80 (120/80).  Presión arterial elevada. Se llama de esta manera cuando la presión sistólica es de 120 a 129 y la diastólica es mark que 80.  Presión arterial kvng de etapa 1. La presión sistólica está entre 130 y 139 o la diastólica está entre 80 y 89.  Presión arterial kvng de etapa 2. La presión sistólica es 140 o superior o la diastólica es 90 o superior.  Un estilo de bar saludable para el corazón puede ayudarlo a controlar moody presión arterial sin tener que nikita medicamentos. Más abajo encontrará algunasrecomendaciones a fin de llevar un estilo de bar saludable para el corazón.     Coma alimentos saludables para el corazón  Elija alimentos con bajo contenido de sodio y grasas. Limite la ingesta de sodio a 2,300 mg diarios o según la cantidad que le indique el proveedor de atención médica.  Limite el consumo de comidas enlatadas, secas, curadas, envasadas y las comidas rápidas. Pueden contener mucha sal.  Consuma entre ocho y christina porciones de frutas y verduras todos los lamar.  Elija cedric de res magras, pescado o sulema.  Coma pasta y arroz integrales y frijoles.  Consuma entre dos y laurent porciones de productos lácteos descremados o bajos en grasa.  Consulte con moody médico acerca del plan de alimentación DASH. Ramya plan ayuda a reducir la presión arterial.  Cuando coma en un restaurante, pida que le preparen los platos sin sal.    Mantenga un peso saludable  Pregunte a moody proveedor de atención médica cuántas calorías puede consumir por día. Respete jeremias cantidad.  Pregunte al proveedor cuáles son los límites de peso más adecuados para usted. Si tiene sobrepeso, perder entre un 3 % y un 5 % de moody peso corporal puede ayudarlo a disminuir moody presión arterial. Un buen objetivo es perder un 10 % de moody peso corporal en un año.  Limite el consumo de tentempiés y  dulces.  [place] ejercicio con regularidad.    [place] más actividad física  Busque actividades que le gusten. Puede hacerlas de forma individual, o con amigos o moody uche. Pruebe andar en bicicleta, bailar, caminar o trotar.  Estacione lejos de las entradas de los edificios para caminar más.  Use las escaleras en lugar del ascensor.  Cuando pueda, camine o vaya en bicicleta en vez de ir en automóvil.  [place] hojas, trabaje en moody jardín o lincoln reparaciones en la casa.  [place]incoln actividad física de moderada a intensa jarek, al menos, 30 minutos por día, un mínimo de orquidea días a la semana.     Controle el estrés  Tómese tiempo para relajarse y disfrutar de la bar. Encuentre tiempo para reírse.  Comparta fred preocupaciones con fred seres queridos y con el proveedor de atención médica.  Visite a fred familiares y amigos, y mantenga fred pasatiempos favoritos.    Limite el alcohol y deje de fumar  Los hombres no deberían nikita más de dos bebidas alcohólicas por día.  Las mujeres no deberían nikita más de eladio bebida alcohólica por día.  Si fuma, elabore un plan para dejar de hacerlo. Pídale ayuda a moody proveedor de atención médica. Si fuma mucho, aumenta el riesgo de padecer enfermedades del corazón y derrames cerebrales. Consulte con el proveedor sobre programas para dejar de fumar y otros recursos de apoyo.    Medicamentos para la presión arterial  Si los cambios en el estilo de bar no son suficientes, el proveedor de atención médica puede recetarle medicamentos para la presión arterial kvng. [place] todos los medicamentos según le indiquen. Si tiene alguna pregunta sobre los medicamentos, consulte al proveedor antes de dejar de tomarlos ocambiarlos.     © 2819-7381 [employer]. Todos los derechos reservados. Esta información no pretende sustituir la atención médica profesional. Sólo sumédico puede diagnosticar y tratar un problema de amanda.    [place]  ¿Qué es Presión Arterial [place]?  Entender que es  Presión Arterial, los riesgos en la amanda al tener Presión Arterial [place], los factores que lo ponen a usted en riesgo de tener Presión Arterial [place] y la importancia de trabajar con moody proveedor del cuidado de la amanda para controlarla.  Para daylin el video:  Escanee el código code  Utilizando moody dispositivo móvil, escanee el siguiente código:  OR  Vaya al sitio web:  [email or web address]  Ingrese el código de receta:  3414S    © 2000-2022 [employer]. All rights reserved. This information is not intended as a substitute for professional medical care. Always follow your healthcare professional's instructions.    Video [facility]  [place] john con presión arterial elevada  Ciertos alimentos pueden hacer que moody presión suba demasiado. Remi y aprenda lo fácil que es disfrutar de comidas deliciosas sin dañar moody amanda.     Para daylin el video:  Escanee el código QR  Utilizando moody dispositivo móvil, escanee el siguiente código:  O  Vaya al sitio web:  [email or web address]  Ingrese el código de receta:   DPX    © 2000-2022 [employer]. All rights reserved. This information is not intended as a substitute for professional medical care. Always follow your healthcare professional's instructions.    Cómo tomarse la presión arterial  La presión arterial es la fuerza que la zuleyma ejerce contra las plmumer de los vasos al desplazarse por ellos. Usted puede tomarse moody propia presión arterial con un medidor digital. Tómese la presión arterial a la misma hora y en elmismo brazo, tan a menudo bc le indique moody proveedor de atención médica.   Acerca de los monitores de presión arterial   Los monitores de presión arterial están diseñados para diferentes edades y diferentes casos. Usted puede encontrar monitores para adultos mayores, para mujeres embarazadas y para niños. Asegúrese de que el que escoja es el adecuadopara moody edad y situación.   La American Heart Association recomienda un monitor  automatizado con manguito que se adapte al tamaño de moody brazo (biceps). Moody brazo debe caber john dentro del manguito, ya que si es muy neil o muy pequeño no suministrará eladio lectura exacta. Mídase el grosor de moody barazo paraencontrar la talla correcta para usted.   Los monitores que se ahieren a un dedo o a la demetrius no son tan precisos comolos que se adhieren a moody brazo.   Pídale a moody proveedor de atención médica que le ayude a escoger un monitor. Traiga el monitor a moody próxima asuncion médica si necesita ayuda para aprender ausarlo correctamente.   Los pasos que se dan a continuación son instrucciones generales para usar unmonitor digital automatizado.     1. Relájese  Tómese la presión arterial a la misma hora todos los días, bc temprano en la mañana o al final de la tarde.  Espere al menos eladio hora después de trinh fumado, comido o hecho ejercicio. No terrence café, té, soda u otras bebidas cafeínadas antes de tomarse la presión.  Siéntese cómodamente a la russo. Coloque el medidor digital cerca de usted.  Descanse jarek algunos minutos antes de empezar.    2. Coloque el manguito  Ponga el brazo sobre la russo, con la godoy de la mano hacia arriba. El brazo debe quedar a la altura del corazón. Ajuste el manguito alrededor del brazo, ambika por encima del codo. Es mejor colocarlo sobre la piel desnuda, sin ropa. La mayoría de los manguitos le indicarán donde debe alinearlo con la arteria braquial (el vaso sanguíneo en la mitad del brazo en la parte interna del codo). Arianna las instrucciones que vienen con moody monitor para daylin eladio ilustración. También puede llevar moody manguito a la asuncion con moody proveedor de atención médica para que le muestren cómo usarlo correctamente.    3. Infle el manguito  Oprima el botón para iniciar el bombeo automático.  El manguito sherine se aprieta y luego se afloja.  Los números cambian. Cuando paran de cambiar, el medidor indicará moody presión arterial.  Tómese 2 o 3 lecturas con un minuto de  diferencia entre eladio y otra.      4. Anote los resultados  Tilden nota de los números de moody presión arterial, así bc de la fecha y la hora de la medición. Mantenga los resultados en un solo lugar, por ejemplo en un cuaderno. Incluso si moody monitor tiene eladio memoria incorporada, mantenga eladio copia por escrito de los resultados.  Quite el manguito del brazo y apague la máquina.  Traiga los resultados de fred lecturas a cada eladio de las citas con moody proveedor de atención médica. También anote el días si cambió la dosis de moody medicamento. Fransico inforamación debe incuirse en los registros que rebecca de moody presión arterial para que le ayude a moody proveedor de atención médica a evaluar qué tan john están funcionando los cambios en el medicamento.  Pregúntele a moody proveedor de atención médica a qué niveles debe obtener ayuda de inmediato.      © 6147-3404 [employer]. Todos los derechos reservados. Esta información no pretende sustituir la atención médica profesional. Sólo sumédico puede diagnosticar y tratar un problema de amanda.                         The 21st Century Cures Act makes medical notes available to patients in the interest of transparency.  However, please be advised that this is a medical document.  It is intended as a peer to peer communication.  It is written in medical language and may contain abbreviations or verbiage that are technical and unfamiliar.  It may appear blunt or direct.  Medical documents are intended to carry relevant information, facts as evident, and the clinical opinion of the practitioner.          [1]   Current Outpatient Medications   Medication Sig Dispense Refill    Zoster Vac Recomb Adjuvanted (SHINGRIX) 50 MCG/0.5ML Intramuscular Recon Susp Inject 1 each into the muscle Every 2 (two) months. 1 each 1    amLODIPine 10 MG Oral Tab Take 1 tablet (10 mg total) by mouth daily. 90 tablet 1   [2]   Allergies  Allergen Reactions    Lisinopril RASH   [3]   Social  History  Socioeconomic History    Marital status:    Tobacco Use    Smoking status: Former     Current packs/day: 0.00     Types: Cigarettes     Quit date: 2008     Years since quittin.3     Passive exposure: Past    Smokeless tobacco: Former   Vaping Use    Vaping status: Never Used   Substance and Sexual Activity    Alcohol use: Yes     Alcohol/week: 3.0 standard drinks of alcohol     Types: 3 Cans of beer per week     Comment: maybe 3 beers a day    Drug use: Never

## 2025-05-12 ENCOUNTER — LAB ENCOUNTER (OUTPATIENT)
Dept: LAB | Age: 65
End: 2025-05-12
Attending: STUDENT IN AN ORGANIZED HEALTH CARE EDUCATION/TRAINING PROGRAM
Payer: COMMERCIAL

## 2025-05-12 DIAGNOSIS — I10 ESSENTIAL HYPERTENSION: ICD-10-CM

## 2025-05-12 DIAGNOSIS — E55.9 VITAMIN D DEFICIENCY: ICD-10-CM

## 2025-05-12 DIAGNOSIS — Z12.5 SCREENING FOR PROSTATE CANCER: ICD-10-CM

## 2025-05-12 DIAGNOSIS — Z00.00 ANNUAL PHYSICAL EXAM: ICD-10-CM

## 2025-05-12 LAB
ALBUMIN SERPL-MCNC: 4.4 G/DL (ref 3.2–4.8)
ALBUMIN/GLOB SERPL: 1.2 {RATIO} (ref 1–2)
ALP LIVER SERPL-CCNC: 168 U/L (ref 45–117)
ALT SERPL-CCNC: 40 U/L (ref 10–49)
ANION GAP SERPL CALC-SCNC: 6 MMOL/L (ref 0–18)
AST SERPL-CCNC: 27 U/L (ref ?–34)
BASOPHILS # BLD AUTO: 0.09 X10(3) UL (ref 0–0.2)
BASOPHILS NFR BLD AUTO: 1.4 %
BILIRUB SERPL-MCNC: 0.3 MG/DL (ref 0.2–1.1)
BUN BLD-MCNC: 13 MG/DL (ref 9–23)
BUN/CREAT SERPL: 17.6 (ref 10–20)
CALCIUM BLD-MCNC: 9 MG/DL (ref 8.7–10.4)
CHLORIDE SERPL-SCNC: 109 MMOL/L (ref 98–112)
CHOLEST SERPL-MCNC: 166 MG/DL (ref ?–200)
CO2 SERPL-SCNC: 24 MMOL/L (ref 21–32)
COMPLEXED PSA SERPL-MCNC: 1.31 NG/ML (ref ?–4)
CREAT BLD-MCNC: 0.74 MG/DL (ref 0.7–1.3)
CREAT UR-SCNC: 116.4 MG/DL
DEPRECATED RDW RBC AUTO: 41 FL (ref 35.1–46.3)
EGFRCR SERPLBLD CKD-EPI 2021: 102 ML/MIN/1.73M2 (ref 60–?)
EOSINOPHIL # BLD AUTO: 0.3 X10(3) UL (ref 0–0.7)
EOSINOPHIL NFR BLD AUTO: 4.5 %
ERYTHROCYTE [DISTWIDTH] IN BLOOD BY AUTOMATED COUNT: 13.1 % (ref 11–15)
EST. AVERAGE GLUCOSE BLD GHB EST-MCNC: 103 MG/DL (ref 68–126)
FASTING PATIENT LIPID ANSWER: NO
FASTING STATUS PATIENT QL REPORTED: NO
GLOBULIN PLAS-MCNC: 3.8 G/DL (ref 2–3.5)
GLUCOSE BLD-MCNC: 93 MG/DL (ref 70–99)
HBA1C MFR BLD: 5.2 % (ref ?–5.7)
HCT VFR BLD AUTO: 40.3 % (ref 39–53)
HDLC SERPL-MCNC: 37 MG/DL (ref 40–59)
HGB BLD-MCNC: 13.8 G/DL (ref 13–17.5)
IMM GRANULOCYTES # BLD AUTO: 0.03 X10(3) UL (ref 0–1)
IMM GRANULOCYTES NFR BLD: 0.5 %
LDLC SERPL CALC-MCNC: 95 MG/DL (ref ?–100)
LYMPHOCYTES # BLD AUTO: 2.17 X10(3) UL (ref 1–4)
LYMPHOCYTES NFR BLD AUTO: 32.6 %
MCH RBC QN AUTO: 29.2 PG (ref 26–34)
MCHC RBC AUTO-ENTMCNC: 34.2 G/DL (ref 31–37)
MCV RBC AUTO: 85.4 FL (ref 80–100)
MICROALBUMIN UR-MCNC: <0.3 MG/DL
MONOCYTES # BLD AUTO: 0.42 X10(3) UL (ref 0.1–1)
MONOCYTES NFR BLD AUTO: 6.3 %
NEUTROPHILS # BLD AUTO: 3.64 X10 (3) UL (ref 1.5–7.7)
NEUTROPHILS # BLD AUTO: 3.64 X10(3) UL (ref 1.5–7.7)
NEUTROPHILS NFR BLD AUTO: 54.7 %
NONHDLC SERPL-MCNC: 129 MG/DL (ref ?–130)
OSMOLALITY SERPL CALC.SUM OF ELEC: 288 MOSM/KG (ref 275–295)
PLATELET # BLD AUTO: 301 10(3)UL (ref 150–450)
POTASSIUM SERPL-SCNC: 4 MMOL/L (ref 3.5–5.1)
PROT SERPL-MCNC: 8.2 G/DL (ref 5.7–8.2)
RBC # BLD AUTO: 4.72 X10(6)UL (ref 4.3–5.7)
SODIUM SERPL-SCNC: 139 MMOL/L (ref 136–145)
T4 FREE SERPL-MCNC: 1 NG/DL (ref 0.8–1.7)
TRIGL SERPL-MCNC: 196 MG/DL (ref 30–149)
TSI SER-ACNC: 5.68 UIU/ML (ref 0.55–4.78)
VIT D+METAB SERPL-MCNC: 25.5 NG/ML (ref 30–100)
VLDLC SERPL CALC-MCNC: 32 MG/DL (ref 0–30)
WBC # BLD AUTO: 6.7 X10(3) UL (ref 4–11)

## 2025-05-12 PROCEDURE — 80061 LIPID PANEL: CPT

## 2025-05-12 PROCEDURE — 82306 VITAMIN D 25 HYDROXY: CPT

## 2025-05-12 PROCEDURE — 84439 ASSAY OF FREE THYROXINE: CPT

## 2025-05-12 PROCEDURE — 84443 ASSAY THYROID STIM HORMONE: CPT

## 2025-05-12 PROCEDURE — 82043 UR ALBUMIN QUANTITATIVE: CPT

## 2025-05-12 PROCEDURE — 36415 COLL VENOUS BLD VENIPUNCTURE: CPT

## 2025-05-12 PROCEDURE — 83036 HEMOGLOBIN GLYCOSYLATED A1C: CPT

## 2025-05-12 PROCEDURE — 82570 ASSAY OF URINE CREATININE: CPT

## 2025-05-12 PROCEDURE — 80053 COMPREHEN METABOLIC PANEL: CPT

## 2025-05-12 PROCEDURE — 85025 COMPLETE CBC W/AUTO DIFF WBC: CPT

## 2025-05-13 NOTE — PROGRESS NOTES
Please relay to patient:    Dave There!     Dr. Adams here, I have reviewed your labs here are your recommendations:    # Lipids/cholesterol: Your ASCVD, ie 10-year risk score which indicates the potential chance that you could have a heart attack, stroke or death related to cholesterol/heart disease, is GREATER than the 5% cut off. It would be recommended we start a cholesterol medication such as rosuvastatin 10mg to help lower your risk of a heart attack. Let me know if you are willing to start this medication. We should also check the blood vessels of the heart using a 50$ exam called a heart scan/calcium score. If a heart scan/calcium was ordered at our visit, please proceed with scheduling and we will follow up to review your results. This scan result does not change the recommendation to start a cholesterol medication, it simply confirms and provides guidance for next steps for evaluation and treatment of heart disease.     The 10-year ASCVD risk score (Lauren NASSAR, et al., 2019) is: 14%    Values used to calculate the score:      Age: 64 years      Sex: Male      Is Non- : No      Diabetic: No      Tobacco smoker: No      Systolic Blood Pressure: 132 mmHg      Is BP treated: Yes      HDL Cholesterol: 37 mg/dL      Total Cholesterol: 166 mg/dL    # Diabetes/A1C: Your A1C Last A1c value was 5.2% done 5/12/2025.   which shows  no diabetes. We will recheck this value yearly, sooner if clinically indicated.     # TSH: is elevated suggestive of hypothyroidism, Please repeat labs in 6 weeks and obtain thyroid ultrasound and follow up in 6 weeks after completion of labs and ultrasound to discuss results    # CMP: Your comprehensive metabolic panel shows overall stable functioning kidneys (creatinine, GFR), liver (AST, ALT, Bilirubin), and electrolytes (sodium, potassium, calcium). Slight variations in other values such as BUN/Creat, Serum Osm, anion gap, chloride, etc are not of clinical value  at this time.     # CBC: Your complete blood count shows overall stable red blood cells, white blood cells, platelets (help you stop bleeding), and hematocrit (thickness of blood),  Slight variations in other values such as RDW/sw, MCH are not of clinical value at this time.     # Vitamins: Your vitamin D level is Vitamin D Insufficiency (<30ng/mL) please start 2,000 International Units daily (it is cheaper to purchase from [employer] or your local pharmacy rather than sending a prescription in). will recheck with next annual physical or sooner if clinically indicated      # Prostate Cancer Screening: Your prostate level, ie PSA, is normal.       If there are still any other pending labs/results that are still pending. I will send a follow up Layar message once those result.     If you have any questions or concerns in regards to these labs please schedule a follow up and will gladly discuss.   -Dr. Adams

## 2025-05-20 ENCOUNTER — OFFICE VISIT (OUTPATIENT)
Dept: ALLERGY | Facility: CLINIC | Age: 65
End: 2025-05-20
Payer: COMMERCIAL

## 2025-05-20 ENCOUNTER — LAB ENCOUNTER (OUTPATIENT)
Dept: LAB | Age: 65
End: 2025-05-20
Attending: ALLERGY & IMMUNOLOGY
Payer: COMMERCIAL

## 2025-05-20 DIAGNOSIS — R06.2 WHEEZING: ICD-10-CM

## 2025-05-20 DIAGNOSIS — E03.8 SUBCLINICAL HYPOTHYROIDISM: ICD-10-CM

## 2025-05-20 DIAGNOSIS — Z23 NEED FOR PROPHYLACTIC VACCINATION WITH STREPTOCOCCUS PNEUMONIAE (PNEUMOCOCCUS) AND INFLUENZA VACCINES: ICD-10-CM

## 2025-05-20 DIAGNOSIS — T78.40XA ALLERGIC REACTION, INITIAL ENCOUNTER: Primary | ICD-10-CM

## 2025-05-20 DIAGNOSIS — T78.40XA ALLERGIC REACTION, INITIAL ENCOUNTER: ICD-10-CM

## 2025-05-20 DIAGNOSIS — Z23 FLU VACCINE NEED: ICD-10-CM

## 2025-05-20 DIAGNOSIS — L50.9 HIVES: ICD-10-CM

## 2025-05-20 DIAGNOSIS — Z23 NEED FOR COVID-19 VACCINE: ICD-10-CM

## 2025-05-20 LAB
THYROGLOB SERPL-MCNC: <15 U/ML (ref ?–60)
THYROPEROXIDASE AB SERPL-ACNC: 31 U/ML (ref ?–60)
TSI SER-ACNC: 2.12 UIU/ML (ref 0.55–4.78)

## 2025-05-20 PROCEDURE — 99204 OFFICE O/P NEW MOD 45 MIN: CPT | Performed by: ALLERGY & IMMUNOLOGY

## 2025-05-20 PROCEDURE — 84443 ASSAY THYROID STIM HORMONE: CPT

## 2025-05-20 PROCEDURE — 86800 THYROGLOBULIN ANTIBODY: CPT

## 2025-05-20 PROCEDURE — 36415 COLL VENOUS BLD VENIPUNCTURE: CPT | Performed by: ALLERGY & IMMUNOLOGY

## 2025-05-20 PROCEDURE — 82785 ASSAY OF IGE: CPT | Performed by: ALLERGY & IMMUNOLOGY

## 2025-05-20 PROCEDURE — 83520 IMMUNOASSAY QUANT NOS NONAB: CPT

## 2025-05-20 PROCEDURE — 86003 ALLG SPEC IGE CRUDE XTRC EA: CPT | Performed by: ALLERGY & IMMUNOLOGY

## 2025-05-20 PROCEDURE — 86376 MICROSOMAL ANTIBODY EACH: CPT

## 2025-05-20 RX ORDER — ALBUTEROL SULFATE 90 UG/1
2 INHALANT RESPIRATORY (INHALATION) EVERY 4 HOURS PRN
Qty: 1 EACH | Refills: 0 | Status: SHIPPED | OUTPATIENT
Start: 2025-05-20

## 2025-05-20 RX ORDER — METHYLPREDNISOLONE 4 MG/1
TABLET ORAL
COMMUNITY
Start: 2025-05-19

## 2025-05-20 RX ORDER — LEVOCETIRIZINE DIHYDROCHLORIDE 5 MG/1
5 TABLET, FILM COATED ORAL 2 TIMES DAILY
Qty: 180 TABLET | Refills: 1 | Status: SHIPPED | OUTPATIENT
Start: 2025-05-20

## 2025-05-20 NOTE — PATIENT INSTRUCTIONS
Assessment & Plan  Allergic reaction to lisinopril  Acute allergic reaction attributed to lisinopril, presenting with wheezing, hives, and conjunctival injection. Symptoms commenced approximately one hour post-exposure. No prior asthma or allergy history. Afebrile. Symptoms ameliorated following emergency treatment with diphenhydramine and albuterol nebulizer. Discontinuation of lisinopril advised. Blood tests for common environmental allergens and tryptase level will be conducted to evaluate mast cell activation.  - Initiate Medrol Dosepak as prescribed  - Order blood test for common environmental allergens  - Order tryptase level to evaluate mast cell activation  - Prescribe Xyzal for antihistamine coverage, up to twice daily  - Prescribe albuterol inhaler for use every 4-6 hours as needed for wheezing or dyspnea    Wheezing  Wheezing identified as part of the allergic reaction. No asthma history. Improved post-albuterol administration in the emergency setting. No additional respiratory interventions required. Normal pulse oximetry.  - Prescribe albuterol inhaler for use every 4-6 hours as needed for wheezing or dyspnea    Hives  Hives emerged as part of the allergic reaction, initially absent but appeared the following morning. No prior history of urticaria or similar reactions. Xyzal prescribed for antihistamine coverage.  - Prescribe Xyzal for antihistamine coverage, up to twice daily     Flu vaccine in the fall recommended  COVID-vaccine booster in the fall recommended     Orders This Visit:  Orders Placed This Encounter   Procedures    [facility]    Tryptase       Meds This Visit:  Requested Prescriptions     Signed Prescriptions Disp Refills    levocetirizine 5 MG Oral Tab 180 tablet 1     Sig: Take 1 tablet (5 mg total) by mouth in the morning and 1 tablet (5 mg total) before bedtime.    albuterol (VENTOLIN HFA) 108 (90 Base) MCG/ACT Inhalation Aero Soln 1 each 0     Sig: Inhale 2 puffs into the lungs  every 4 (four) hours as needed for Wheezing.

## 2025-05-20 NOTE — PROGRESS NOTES
Karel Ford is a 64 year old male.    HPI:     Chief Complaint   Patient presents with    Allergies     Patient presents for allergy reaction, was in ER yesterday day- had wheezing, hives on arms     Patient is a 64-year-old male who presents for allergy consultation upon referral of his PCP, Dr. Adams with a chief complaint of allergic reaction  Prior note from visit with PCP notes ED evaluation on March 30, 2025 for rash and hand and feet swelling.  PED notes no lip swelling or respiratory issues.    Prior note visit with PCP from April 2, 2025 reviewed and appreciated    Immunizations reviewed  Today patient reports  Patient late for appointment today    Ed  yesterday   Per ed notes  \"Patient was brought to ED for acute onset of red eyes and wheezing. Tx with nebs, no SOB at this time. No Hx of this in the past but has had episodes of rash erupt recently and is supposed to see allergist tomorrow. No CP. Unknown trigger at this time. \"   Pulse Ox: O2 Sat: 98 % on O2 Device: None (Room air). Interpreted by me as: No hypoxia.     Telemetry Rhythm Strip Ordered. Telemetry Rhythm Strip reviewed and interpreted by me as:  Sinus tachycardia,      EKG shows sinus tachycardia, rate of 109 BPM, no ST elevationTWI II, III, no old to compare     Laboratory results ordered and independently reviewed and interpreted by me as below:   Labs Reviewed   CBC W/ DIFF - Abnormal       Result Value    WBC 4.0      RBC 4.83      HGB 14.4      HCT 41.8      MCV 86.5      MCH 29.8      MCHC 34.4      RDW 13.6      PLT       MPV 8.6 (*)     Immature Platelet Fraction 1.6      Immature Platelet Fraction Absolute 3.3      BAND 43      SEG 42      LYMPH 13      MONO 1      EO 1      BAND # 1.8 (*)     SEG # 1.7      LYMPH # 0.5 (*)     MONO # 0.0      EO # 0.0      DIFF TYPE MANUAL      PLT EST NORMAL      WBC MORPHOLOGY FEW/SLT      RBC MORPHOLOGY NORMAL     COMPLETE METABOLIC PANEL - Abnormal    SODIUM 135 (*)     POTASSIUM 3.3       CHLORIDE 104      CO2 23      ANION GAP 8      BUN 12      CREATININE 0.77      GLUCOSE 98      ALBUMIN 4.2      PROTEIN, TOTAL 8.2      CALCIUM 9.1      ALKALINE PHOSPHATASE 135 (*)     ALT (SGPT) 25      AST (SGOT) 32      BILIRUBIN,TOTAL 0.8      ESTIMATED      MAGNESIUM    MAGNESIUM 1.8     HIGH SENSITIVITY TROPONIN I    High Sensitivity Troponin I 6     B-NATRIURETIC PEPTIDE     Assessment / Plan / MDM:      Wheezing in patient who is being worked up for allergies, due to appt tomorrow, will avoid steroid, give pepcid and benardyl and monitor  MDM    Imaging result ordered and independently reviewed and interpreted by me as below:   XR CHEST 2 VIEWS   Final Result   No radiographic evidence of acute cardiopulmonary process.   History of Present Illness  Karel Ford is a 64 year old male who presents with an acute allergic reaction characterized by wheezing and rash. He is accompanied by his child.    He experienced an acute onset of symptoms including shivering, wheezing, and a rash that began around 5 PM after returning home from work. The symptoms started while he was sitting at home, with no preceding food intake or unusual activities noted. The rash initially appeared on his elbows and later spread to cover his body. Due to the severity of his symptoms, he was taken to the emergency room at [facility] via ambulance.    In the emergency room, he was administered Benadryl and albuterol via nebulizer, which alleviated his symptoms, including the cessation of shaking. A Medrol Dosepak was prescribed but not yet started. He was also given a chest x-ray and EKG. His oxygen levels were normal, and no fever was reported. He has not experienced any significant runny nose or sneezing.    His past medical history includes the use of lisinopril, which he suspects may have caused a previous allergic reaction. He is currently taking amlodipine for blood pressure management. There is no history of asthma or  seasonal allergies, and no family history of allergies or asthma. He has a cat and a dog at home, which have been with the family for many years.    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 He has been in this job for seven to eight years without prior issues. He does not smoke, and there is no substance use reported. He consumes alcohol.    During the review of symptoms, he reported red eyes and wheezing but no fever. He experienced significant wheezing last night, which affected his sleep. He does not have an albuterol inhaler at home.         HISTORY:  Past Medical History[1]   Past Surgical History[2]   Family History[3]   Social History: Short Social Hx on File[4]     Medications (Active prior to today's visit):  Current Medications[5]    Allergies:  Allergies[6]      ROS:     Allergic/Immuno:  See HPI  Cardiovascular:  Negative for irregular heartbeat/palpitations, chest pain, edema  Constitutional:  Negative night sweats,weight loss, irritability and lethargy  Endocrine:  Negative for cold intolerance, polydipsia and polyphagia  ENMT:  Negative for ear drainage, hearing loss and nasal drainage  Eyes:  Negative for eye discharge and vision loss  Gastrointestinal:  Negative for abdominal pain, diarrhea and vomiting  Genitourinary:  Negative for dysuria and hematuria  Hema/Lymph:  Negative for easy bleeding and easy bruising  Integumentary:  Negative for pruritus and rash  Musculoskeletal:  Negative for joint symptoms  Neurological:  Negative for dizziness, seizures  Psychiatric:  Negative for inappropriate interaction and psychiatric symptoms  Respiratory:  Negative for cough, dyspnea and wheezing      PHYSICAL EXAM:   Constitutional: responsive, no acute distress noted  Head/Face: NC/Atraumatic  Eyes/Vision: conjunctiva and lids are normal extraocular motion is intact   Ears/Audiometry: tympanic membranes are normal bilaterally hearing is grossly  intact  Nose/Mouth/Throat: nose and throat are clear mucous membranes are moist   Neck/Thyroid: neck is supple without adenopathy  Lymphatic: no abnormal cervical, supraclavicular or axillary adenopathy is noted  Respiratory: normal to inspection lungs are clear to auscultation bilaterally normal respiratory effort   Cardiovascular: regular rate and rhythm no murmurs, gallups, or rubs  Abdomen: soft non-tender non-distended  Skin/Hair: no unusual rashes present  Extremities: no edema, cyanosis, or clubbing  Neurological:Oriented to time, place, person & situation       ASSESSMENT/PLAN:   Assessment   Encounter Diagnoses   Name Primary?    Allergic reaction, initial encounter Yes    Flu vaccine need     Need for COVID-19 vaccine     Need for prophylactic vaccination with Streptococcus pneumoniae (Pneumococcus) and Influenza vaccines     Hives     Wheezing        Assessment & Plan     Assessment & Plan  Allergic reaction to lisinopril  Acute allergic reaction attributed to lisinopril, presenting with wheezing, hives, and conjunctival injection. Symptoms commenced approximately one hour post-exposure. No prior asthma or allergy history. Afebrile. Symptoms ameliorated following emergency treatment with diphenhydramine and albuterol nebulizer. Discontinuation of lisinopril advised. Blood tests for common environmental allergens and tryptase level will be conducted to evaluate mast cell activation.  - Initiate Medrol Dosepak as prescribed  - Order blood test for common environmental allergens  - Order tryptase level to evaluate mast cell activation  - Prescribe Xyzal for antihistamine coverage, up to twice daily  - Prescribe albuterol inhaler for use every 4-6 hours as needed for wheezing or dyspnea    Wheezing  Wheezing identified as part of the allergic reaction. No asthma history. Improved post-albuterol administration in the emergency setting. No additional respiratory interventions required. Normal pulse oximetry.  -  Prescribe albuterol inhaler for use every 4-6 hours as needed for wheezing or dyspnea    Hives  Hives emerged as part of the allergic reaction, initially absent but appeared the following morning. No prior history of urticaria or similar reactions. Xyzal prescribed for antihistamine coverage.  - Prescribe Xyzal for antihistamine coverage, up to twice daily     Flu vaccine in the fall recommended  COVID-vaccine booster in the fall recommended     Orders This Visit:  Orders Placed This Encounter   Procedures    [facility] 8    Tryptase       Meds This Visit:  Requested Prescriptions     Signed Prescriptions Disp Refills    levocetirizine 5 MG Oral Tab 180 tablet 1     Sig: Take 1 tablet (5 mg total) by mouth in the morning and 1 tablet (5 mg total) before bedtime.    albuterol (VENTOLIN HFA) 108 (90 Base) MCG/ACT Inhalation Aero Soln 1 each 0     Sig: Inhale 2 puffs into the lungs every 4 (four) hours as needed for Wheezing.       Imaging & Referrals:  None     2025  Edy Hernandez MD      If medication samples were provided today, they were provided solely for patient education and training related to self administration of these medications.  Teaching, instruction and sample was provided to the patient by myself.  Teaching included  a review of potential adverse side effects as well as potential efficacy.  Patient's questions were answered in regards to medication administration and dosing and potential side effects. Teaching was provided via the teach back method         [1]   Past Medical History:   Essential hypertension   [2] History reviewed. No pertinent surgical history.  [3]   Family History  Problem Relation Age of Onset    Lung Disorder Mother     Heart Disease Father    [4]   Social History  Socioeconomic History    Marital status:    Tobacco Use    Smoking status: Former     Current packs/day: 0.00     Types: Cigarettes     Quit date: 2008     Years since quittin.3     Passive  exposure: Past    Smokeless tobacco: Former   Vaping Use    Vaping status: Never Used   Substance and Sexual Activity    Alcohol use: Yes     Alcohol/week: 3.0 standard drinks of alcohol     Types: 3 Cans of beer per week     Comment: maybe 3 beers a day    Drug use: Never   [5]   Current Outpatient Medications   Medication Sig Dispense Refill    levocetirizine 5 MG Oral Tab Take 1 tablet (5 mg total) by mouth in the morning and 1 tablet (5 mg total) before bedtime. 180 tablet 1    albuterol (VENTOLIN HFA) 108 (90 Base) MCG/ACT Inhalation Aero Soln Inhale 2 puffs into the lungs every 4 (four) hours as needed for Wheezing. 1 each 0    amLODIPine 10 MG Oral Tab Take 1 tablet (10 mg total) by mouth daily. 90 tablet 1    methylPREDNISolone 4 MG Oral Tablet Therapy Pack Medrol dose olman, use as directed (Patient not taking: Reported on 5/20/2025)      rosuvastatin 10 MG Oral Tab Take 1 tablet (10 mg total) by mouth nightly. FOR CHOLESTEROL. (Patient not taking: Reported on 5/20/2025) 90 tablet 3    Zoster Vac Recomb Adjuvanted (SHINGRIX) 50 MCG/0.5ML Intramuscular Recon Susp Inject 1 each into the muscle Every 2 (two) months. (Patient not taking: Reported on 5/20/2025) 1 each 1   [6]   Allergies  Allergen Reactions    Lisinopril RASH

## 2025-05-22 ENCOUNTER — TELEPHONE (OUTPATIENT)
Dept: ALLERGY | Facility: CLINIC | Age: 65
End: 2025-05-22

## 2025-05-22 LAB
A ALTERNATA IGE QN: <0.1 KUA/L (ref ?–0.1)
A FUMIGATUS IGE QN: <0.1 KUA/L (ref ?–0.1)
AMER SYCAMORE IGE QN: <0.1 KUA/L (ref ?–0.1)
BERMUDA GRASS IGE QN: <0.1 KUA/L (ref ?–0.1)
BOXELDER IGE QN: <0.1 KUA/L (ref ?–0.1)
C HERBARUM IGE QN: <0.1 KUA/L (ref ?–0.1)
CALIF WALNUT IGE QN: <0.1 KUA/L (ref ?–0.1)
CAT DANDER IGE QN: <0.1 KUA/L (ref ?–0.1)
CMN PIGWEED IGE QN: <0.1 KUA/L (ref ?–0.1)
COMMON RAGWEED IGE QN: 0.15 KUA/L (ref ?–0.1)
COTTONWOOD IGE QN: 0.11 KUA/L (ref ?–0.1)
D FARINAE IGE QN: 0.58 KUA/L (ref ?–0.1)
D PTERONYSS IGE QN: 0.61 KUA/L (ref ?–0.1)
DOG DANDER IGE QN: 0.15 KUA/L (ref ?–0.1)
IGE SERPL-ACNC: 980 KU/L (ref 2–214)
M RACEMOSUS IGE QN: <0.1 KUA/L (ref ?–0.1)
MARSH ELDER IGE QN: 0.25 KUA/L (ref ?–0.1)
MOUSE EPITH IGE QN: <0.1 KUA/L (ref ?–0.1)
MT JUNIPER IGE QN: <0.1 KUA/L (ref ?–0.1)
P NOTATUM IGE QN: <0.1 KUA/L (ref ?–0.1)
PECAN/HICK TREE IGE QN: <0.1 KUA/L (ref ?–0.1)
ROACH IGE QN: 0.85 KUA/L (ref ?–0.1)
SALTWORT IGE QN: <0.1 KUA/L (ref ?–0.1)
SILVER BIRCH IGE QN: <0.1 KUA/L (ref ?–0.1)
TIMOTHY IGE QN: <0.1 KUA/L (ref ?–0.1)
WHITE ASH IGE QN: <0.1 KUA/L (ref ?–0.1)
WHITE ELM IGE QN: <0.1 KUA/L (ref ?–0.1)
WHITE MULBERRY IGE QN: <0.1 KUA/L (ref ?–0.1)
WHITE OAK IGE QN: <0.1 KUA/L (ref ?–0.1)

## 2025-05-22 NOTE — TELEPHONE ENCOUNTER
Spoke with wife of patient who is on SARINA. Verified patient's name and date of birth. Informed wife of allergy test results per Dr. Hernandez and our office is waiting on the Tryptase level and once  receives the results and reviews them will contact patient. Wife verbalizes understanding.

## 2025-05-22 NOTE — PROGRESS NOTES
Please relay to patient:      Giotolu Mr. Ford,   Your repeat Thyroid studies were normal, likely initial test 2 weeks ago was related to recent illness, but now your thyroid function is stable, will check yearly with next physical.  -Dr. Adams

## 2025-05-22 NOTE — TELEPHONE ENCOUNTER
----- Message from Edy Hernandez sent at 5/22/2025  7:25 AM CDT -----    Please contact patient with recent serum IgE profile to environmental allergies.  Patient did show IgE production to cockroach ragweed trees dust mite dog weeds  ----- Message -----  From: Lab, Background User  Sent: 5/22/2025   6:59 AM CDT  To: Edy Hernandez MD

## 2025-05-23 ENCOUNTER — HOSPITAL ENCOUNTER (OUTPATIENT)
Dept: CT IMAGING | Facility: HOSPITAL | Age: 65
Discharge: HOME | End: 2025-05-23
Attending: STUDENT IN AN ORGANIZED HEALTH CARE EDUCATION/TRAINING PROGRAM

## 2025-05-23 DIAGNOSIS — Z00.00 ANNUAL PHYSICAL EXAM: ICD-10-CM

## 2025-05-23 DIAGNOSIS — Z13.6 ENCOUNTER FOR SCREENING FOR CORONARY ARTERY DISEASE: ICD-10-CM

## 2025-05-24 LAB — THYROTROPIN REC AB: <1.1 IU/L

## 2025-05-25 NOTE — PROGRESS NOTES
Please relay to patient if not read:     Hello There!    Your Calcium score is 10 indicating mild Atherosclerosis (Coronary Artery Disease) fatty/plaque buildup in arteries supplying your heart.     Below is your 10-year risk score which indicates the potential chance that you could have a heart attack, stroke or death related to cholesterol/heart disease.     If your ASCVD Score Below is 5% or greater OR Calcium scores Greater than 0 should be treated with cholesterol medication to prevent worsening heart disease. Should you meet either of those criteria, I should have already sent rosuvastatin 10mg nightly and recommend you start taking this if not already, again if your ASCVD risk score is above 5% or if your calcium score is greater than 0.      If you are having any episodes of chest pain, palpitations or shortness of breath on exertion if you have not already scheduled a follow up please do and we can discuss further cardiac workup if warranted (if we have not done so already).     The 10-year ASCVD risk score (Lauren NASSAR, et al., 2019) is: 14%    Values used to calculate the score:      Age: 64 years      Sex: Male      Is Non- : No      Diabetic: No      Tobacco smoker: No      Systolic Blood Pressure: 132 mmHg      Is BP treated: Yes      HDL Cholesterol: 37 mg/dL      Total Cholesterol: 166 mg/dL    I recommend to eat a more balanced mediterranean diet (eat more vegetables and fruits) more omega 3 fatty acids, lean protein (chicken, turkey, seafood), less process/fried fatty foods, less red met, and mixed aerobic exercise 30 minutes a day and intermittent strength training.      We will repeat your Calcium CT testing in 3 years    Let me know if you have any questions,you can schedule a follow up and gladly discuss.  -Dr. Adams

## 2025-05-26 LAB — TRYPTASE: 7.6 UG/L

## 2025-05-26 NOTE — PROGRESS NOTES
Please relay to patient if not read:     Dave  Ford, your TSH and thyroid antibody test are normal  -Dr. Adams

## 2025-05-27 NOTE — TELEPHONE ENCOUNTER
Spoke with wife of patient who is on the SARINA. Verified patient's name and date of birth. Informed wife of Tryptase results - normal per Dr. Hernandez. Wife verbalizes understanding.

## 2025-05-27 NOTE — TELEPHONE ENCOUNTER
----- Message from Edy Hernandez sent at 5/27/2025  7:21 AM CDT -----  Please contact patient with normal tryptase level 7.6  ----- Message -----  From: Lab, Background User  Sent: 5/26/2025   6:06 PM CDT  To: Edy Hernandez MD

## 2025-05-31 NOTE — PROGRESS NOTES
Please relay to patient if not read:     Your CT Calcium score over-read, which shows the other organs in your chest cavity and is included with your recently competed heart scan is overall normal.     The results of the heart scan/calcium score may take up to 2 weeks to be reviewed by the cardiologist. If you were provided with a preliminary score and have questions or concerns, please reach out to my office with your preliminary score in hand.     -Dr. Adams

## 2025-06-08 ENCOUNTER — HOSPITAL ENCOUNTER (EMERGENCY)
Facility: HOSPITAL | Age: 65
Discharge: HOME | End: 2025-06-09
Attending: EMERGENCY MEDICINE
Payer: COMMERCIAL

## 2025-06-08 DIAGNOSIS — T78.40XA ALLERGIC REACTION, INITIAL ENCOUNTER: Primary | ICD-10-CM

## 2025-06-08 PROCEDURE — 99284 EMERGENCY DEPT VISIT MOD MDM: CPT

## 2025-06-09 ENCOUNTER — TELEPHONE (OUTPATIENT)
Dept: ALLERGY | Facility: CLINIC | Age: 65
End: 2025-06-09

## 2025-06-09 VITALS
RESPIRATION RATE: 17 BRPM | WEIGHT: 190 LBS | OXYGEN SATURATION: 94 % | HEART RATE: 86 BPM | TEMPERATURE: 99 F | BODY MASS INDEX: 31.65 KG/M2 | SYSTOLIC BLOOD PRESSURE: 120 MMHG | HEIGHT: 65 IN | DIASTOLIC BLOOD PRESSURE: 73 MMHG

## 2025-06-09 PROCEDURE — 96361 HYDRATE IV INFUSION ADD-ON: CPT

## 2025-06-09 PROCEDURE — 96375 TX/PRO/DX INJ NEW DRUG ADDON: CPT

## 2025-06-09 PROCEDURE — 96374 THER/PROPH/DIAG INJ IV PUSH: CPT

## 2025-06-09 RX ORDER — METHYLPREDNISOLONE 4 MG/1
TABLET ORAL
Qty: 1 EACH | Refills: 0 | Status: SHIPPED | OUTPATIENT
Start: 2025-06-09

## 2025-06-09 RX ORDER — ONDANSETRON 2 MG/ML
4 INJECTION INTRAMUSCULAR; INTRAVENOUS ONCE
Status: COMPLETED | OUTPATIENT
Start: 2025-06-09 | End: 2025-06-09

## 2025-06-09 RX ORDER — FAMOTIDINE 10 MG/ML
20 INJECTION, SOLUTION INTRAVENOUS ONCE
Status: COMPLETED | OUTPATIENT
Start: 2025-06-09 | End: 2025-06-09

## 2025-06-09 RX ORDER — DIPHENHYDRAMINE HYDROCHLORIDE 50 MG/ML
25 INJECTION, SOLUTION INTRAMUSCULAR; INTRAVENOUS ONCE
Status: COMPLETED | OUTPATIENT
Start: 2025-06-09 | End: 2025-06-09

## 2025-06-09 RX ORDER — DEXAMETHASONE SODIUM PHOSPHATE 10 MG/ML
10 INJECTION, SOLUTION INTRAMUSCULAR; INTRAVENOUS ONCE
Status: COMPLETED | OUTPATIENT
Start: 2025-06-09 | End: 2025-06-09

## 2025-06-09 NOTE — ED NOTES
Pt alert, oriented x4.  Arrived from home with family via private vehicle.  Chief complaint is allergic reaction.    Pt states he first noticed symptoms yesterday - hot, dry skin with swelling to upper and lower extremities.  Went away, then returned.  Skin now diffusely hot and dry.  +3 BLE edema from knees down to bottoms of feet.  Pulses palpable.  Swelling noted to neck, lips and perioral, and left eye.  Pt able to speak clearly in full sentences, managing secretions.  Pt denies feelings of swelling in his throat, SOB, CP, itching.      Pt changed into gown and attached to cardiac, NIBP, and SpO2 monitors.  Warm blanket given.  Call light within reach.  Cart low, in locked position.

## 2025-06-09 NOTE — ED INITIAL ASSESSMENT (HPI)
Pt presents with c/o swelling to feet that started Saturday afternoon with redness. Increased concern when itching and redness spreading all  entire body with lips swelling. Cough, \"raspy throat\" and vomiting worsening throughout day. Pt denies brendon and nausea at this time. Hx of allergic reactions.

## 2025-06-09 NOTE — TELEPHONE ENCOUNTER
[employer] contacted for Occitan/English Translation.     :  Melody    ID#: [id number]    Message left on patient's and patient's wife's voicemail asking that they call the Allergy Office as a nurse will need to triage patient's symptoms.    Message left that phones are not closed for the day, call the Allergy Office 6/10/2025 at 8 am or after, if experiencing any difficulty breathing/swallowing call 911 or present immediately to the Emergency Room.

## 2025-06-09 NOTE — ED PROVIDER NOTES
Patient Seen in: [facility] Emergency Department        History  Chief Complaint   Patient presents with    Allergic Rxn Allergies     Stated Complaint: allergic rxn    Subjective:   HPI          Pt is 65 yo M who p/w family for worsening allergic reaction that started yesterday. +itchy red rash all over body. Pt also had vomiting today and swelling to eyes and lips that started at 10 pm. Pt took xyzal and benadryl with no relief. Was on steroids 3 weeks ago for recent reactions. Denies difficulty breathing, dizziness or trouble swallowing.       Objective:     Past Medical History:    Essential hypertension    Hyperlipidemia              History reviewed. No pertinent surgical history.             Social History     Socioeconomic History    Marital status:    Tobacco Use    Smoking status: Former     Current packs/day: 0.00     Types: Cigarettes     Quit date: 2008     Years since quittin.4     Passive exposure: Past    Smokeless tobacco: Former   Vaping Use    Vaping status: Never Used   Substance and Sexual Activity    Alcohol use: Yes     Alcohol/week: 3.0 standard drinks of alcohol     Types: 3 Cans of beer per week     Comment: maybe 3 beers a day    Drug use: Never                                Physical Exam    ED Triage Vitals [25 2329]   /76   Pulse 90   Resp 19   Temp 98.5 °F (36.9 °C)   Temp src Oral   SpO2 97 %   O2 Device None (Room air)       Current Vitals:   Vital Signs  BP: 117/65  Pulse: 83  Resp: 17  Temp: 98.5 °F (36.9 °C)  Temp src: Oral  MAP (mmHg): 80    Oxygen Therapy  SpO2: 95 %  O2 Device: None (Room air)            Physical Exam  GENERAL: No acute distress, awake and alert  HEENT: EOMI, PERRL, +b/l periorbital edema. Oropharynx normal  Neck: supple  CV: RRR, no murmurs  Resp: CTAB, no wheezes or retractions  Extremities: FROM of all extremities, no edema  Neuro: CN intact, normal speech, 5/5 motor strength in all extremities, no focal deficits  SKIN:  warm, dry, +erythema to b/l arms/legs with some patches of urticaria          ED Course  Labs Reviewed - No data to display                MDM       Medical Decision Making  Benadryl/decadron/pepcid/ivf/zofran    Pt observed on cardiac monitor. On reassessment feels improved. Vitals stable  Advised on close f/u and return precautions    Amount and/or Complexity of Data Reviewed  External Data Reviewed: labs and notes.     Details: Recent labs, allergist notes 5/2025 reviewed    Risk  OTC drugs.  Prescription drug management.        Disposition and Plan     Clinical Impression:  1. Allergic reaction, initial encounter         Disposition:  Discharge  6/9/2025  1:48 am    Follow-up:  Edy Hernandez MD  [address] [place], [place] [id number] [phone]    Follow up            Medications Prescribed:  Current Discharge Medication List        START taking these medications    Details   !! methylPREDNISolone (MEDROL) 4 MG Oral Tablet Therapy Pack Dosepack: take as directed  Qty: 1 each, Refills: 0       !! - Potential duplicate medications found. Please discuss with provider.                Supplementary Documentation:

## 2025-06-16 RX ORDER — EPINEPHRINE 0.3 MG/.3ML
INJECTION SUBCUTANEOUS
Qty: 1 EACH | Refills: 0 | Status: SHIPPED | OUTPATIENT
Start: 2025-06-16

## 2025-06-16 NOTE — TELEPHONE ENCOUNTER
RN called to patient using language line solutions Name Hans ID# [id number]    Patient last seen 5/20/25 for allergic reaction    Recently went to the ER on 6/8/25 for allergic reaction   States symptoms included upper lip and eyelid swelling and patient had an itchy red rash all over the body  Patient took Xyzal and benadryl but provided no relief so went to ER  Was prescribed a medrol dose pack that patient finished yesterday Sunday  Was previously on steroids about 3 weeks before recent allergic reaction    Denies current symptoms   Denies taking any current allergy medications/antihistamines   Wife called last week if patient can be seen sooner for a follow-up appointment?    RN advised message will be sent to Dr. Hernandez for further advice/recommendations   May place patient on a wait list as well if wishing to be seen for a follow-up   Advised for worsening symptoms to follow-up in ER/urgent care

## 2025-06-16 NOTE — TELEPHONE ENCOUNTER
Patient's wife contacted via telephone.     Originally called [employer] for Palestinian Interpretation.   Name: Deidre, ID# [id number].      disconnected line as patient's wife is able to speak and comprehend spoken English.     Wife denies that patient is currently experiencing an anaphylactic reaction. Denies that patient is complaining of difficulty breathing, swallowing.  Patient negative for rash, edema through body or of face/lips/tongue/throat.    Wife offers that patient did stop taking Lisinopril months prior and that latest reaction on 6/8/2025 was not caused by Lisinopril.    Patient is currently taking Amlodipine without incident.       Wife states that patient has not been taking Xyzal, but taking Benadryl 25 mg as needed.     Encouraged wife per Dr. Hernandez that patient take Xyzal 5 mg at least nightly to help prevent allergic reaction from occurring.  Explained that Xyzal is a long acting antihistamine that works 24 hours, Benadryl is a short acting antihistamine and used to treat acute reactions, but will not prevent reaction from occurring for those that are prone to an idiopathic (unknown cause) of allergic reaction.     Wife states that patient did not take Xyzal because it was not covered by insurance.  Wife informed that Xyzal is available over the counter and this is the reason insurance would not cover.     Wife asked that patient purchase Xyzal from over the counter.  Take per Dr. Hernandez at least Xyzal 5 mg nightly (even if not reacting) up to one tablet 2-4 times a day if experiencing rash/edema.     Patient to always present to Emergency Room if edema of face/lips/tongue/throat not relived by Xyzal or if patient is experiencing difficulty breathing/swallowing.     Offered to wife a virtual visit to see Dr. Hernandez.  She states she will speak with patient as patient currently has an appointment with Primary Care Physician on 6/25/2025 regarding reaction.     Wife  informed that Dr. Hernandez did send a prescription for Epi-Pen to the pharmacy and asked that when medication is picked-up to request instructions on how to use from pharmacist.     Wife asked that patient if experiencing edema of face/lips/tongue throat, difficulty breathing/swallowing to administer the Epi-Pen and present to Emergency Department or call 911.     Wife repeated back all instructions as above.  She states that she will call back to speak with a nurse if patient would like to schedule a virtual follow-up visit with Dr. Hernandez.

## 2025-06-16 NOTE — TELEPHONE ENCOUNTER
Dr. Hernandez.     Patient was seen in Allergy 5/20/2025 for potential anaphylaxis to Lisinopril.     Patient developed anaphylaxis to an unknown cause on 6/8/2025. Patient now taking amlodipine     Patient does not currently have an Allergy follow-up appointment scheduled.     Wife was asking if patient could be seen in the next few days due to 2nd anaphylactic reaction on 6/8 of unknown cause.     Please advise if patient should be seen for at least a virtual visit in the next week. Please advise on potential prescription for Epi-Pen.

## 2025-06-16 NOTE — TELEPHONE ENCOUNTER
Call reviewed and noted.  Agree with triage advice provided.  May take Xyzal 5 mg once a day up to 2-4 times per day to see if it helps control the rash.  If worsening symptoms recommend urgent care or ED evaluation.

## 2025-06-16 NOTE — TELEPHONE ENCOUNTER
Call noted.  EpiPen prescription sent.  I do not see any available appointments this week.  Please check for next week for a video visit.

## 2025-06-25 ENCOUNTER — TELEPHONE (OUTPATIENT)
Dept: INTERNAL MEDICINE CLINIC | Facility: CLINIC | Age: 65
End: 2025-06-25

## 2025-06-25 ENCOUNTER — OFFICE VISIT (OUTPATIENT)
Dept: INTERNAL MEDICINE CLINIC | Facility: CLINIC | Age: 65
End: 2025-06-25
Payer: COMMERCIAL

## 2025-06-25 VITALS
HEIGHT: 65 IN | SYSTOLIC BLOOD PRESSURE: 130 MMHG | DIASTOLIC BLOOD PRESSURE: 65 MMHG | BODY MASS INDEX: 31.49 KG/M2 | HEART RATE: 83 BPM | WEIGHT: 189 LBS

## 2025-06-25 DIAGNOSIS — I10 ESSENTIAL HYPERTENSION: Primary | ICD-10-CM

## 2025-06-25 DIAGNOSIS — T78.40XD ALLERGIC REACTION, SUBSEQUENT ENCOUNTER: ICD-10-CM

## 2025-06-25 DIAGNOSIS — E78.5 HYPERLIPIDEMIA, UNSPECIFIED HYPERLIPIDEMIA TYPE: ICD-10-CM

## 2025-06-25 DIAGNOSIS — R21 RASH: ICD-10-CM

## 2025-06-25 PROCEDURE — 3075F SYST BP GE 130 - 139MM HG: CPT | Performed by: STUDENT IN AN ORGANIZED HEALTH CARE EDUCATION/TRAINING PROGRAM

## 2025-06-25 PROCEDURE — 3078F DIAST BP <80 MM HG: CPT | Performed by: STUDENT IN AN ORGANIZED HEALTH CARE EDUCATION/TRAINING PROGRAM

## 2025-06-25 PROCEDURE — 3008F BODY MASS INDEX DOCD: CPT | Performed by: STUDENT IN AN ORGANIZED HEALTH CARE EDUCATION/TRAINING PROGRAM

## 2025-06-25 PROCEDURE — 99214 OFFICE O/P EST MOD 30 MIN: CPT | Performed by: STUDENT IN AN ORGANIZED HEALTH CARE EDUCATION/TRAINING PROGRAM

## 2025-06-25 RX ORDER — METHYLPREDNISOLONE 4 MG/1
TABLET ORAL
Qty: 1 EACH | Refills: 0 | Status: SHIPPED | OUTPATIENT
Start: 2025-06-25

## 2025-06-25 RX ORDER — TRIAMCINOLONE ACETONIDE 5 MG/G
1 CREAM TOPICAL 2 TIMES DAILY
Qty: 15 G | Refills: 3 | Status: SHIPPED | OUTPATIENT
Start: 2025-06-25 | End: 2025-07-05

## 2025-06-25 RX ORDER — ROSUVASTATIN CALCIUM 10 MG/1
10 TABLET, COATED ORAL NIGHTLY
Qty: 90 TABLET | Refills: 3 | Status: SHIPPED | OUTPATIENT
Start: 2025-06-25

## 2025-06-25 RX ORDER — AMLODIPINE BESYLATE 10 MG/1
10 TABLET ORAL DAILY
Qty: 90 TABLET | Refills: 3 | Status: SHIPPED | OUTPATIENT
Start: 2025-06-25

## 2025-06-25 NOTE — TELEPHONE ENCOUNTER
FMLA forms received in office from pt. SARINA, FCR and fee collected. NO FAX # AVAILABLE, pt will  completed forms in office. Forwarded to forms dept via email.

## 2025-06-25 NOTE — PROGRESS NOTES
OFFICE NOTE       The following individual(s) verbally consented to be recorded using ambient AI listening technology and understand that they can each withdraw their consent to this listening technology at any point by asking the clinician to turn off or pause the recording:    Patient name: Karel Ford  Additional names:            Patient ID: Karel Ford is a 64 year old male.  Today's Date: 06/25/25  Chief Complaint: ER F/U (Rash on legs still present)      History of Present Illness  Karel Ford is a 64 year old male who presents for follow-up after an allergic reaction.    He experienced an allergic reaction on June 8th, characterized by an itchy red rash along his body, vomiting, and facial swelling, including his eyes and lips. He was treated in the ER with a Medrol Dosepak and was given an EpiPen for emergency use. Despite completing the Medrol Dosepak, he continues to have a mild rash on his arms and legs, with significant itching but no pain.    The allergic reaction began on June 6th, with swelling of his feet noted before a family dinner. By June 8th, his face was swollen, prompting the ER visit. He has not identified any new foods, pets, or environmental changes that could have triggered the reaction. He has a known mild allergy to dog dander, and there were three dogs present during the family gathering.    He has been off the Medrol Dosepak for over ten days, and although the rash persists, he reports no return of the severe symptoms. He has not yet seen the allergist due to scheduling constraints.    His current medications include amlodipine 10 mg and rosuvastatin 10 mg. He also has an EpiPen for emergency use.    No new foods, pets, or environmental changes. No new medications aside from those prescribed for the allergic reaction.       Vitals:    06/25/25 1741   BP: 130/65   Pulse: 83   Weight: 189 lb (85.7 kg)   Height: 5' 5\" (1.651 m)     body mass index is 31.45 kg/m².  BP Readings  from Last 3 Encounters:   06/25/25 130/65   06/09/25 120/73   05/05/25 132/82     The 10-year ASCVD risk score (Lauren DK, et al., 2019) is: 13.6%    Values used to calculate the score:      Age: 64 years      Sex: Male      Is Non- : No      Diabetic: No      Tobacco smoker: No      Systolic Blood Pressure: 130 mmHg      Is BP treated: Yes      HDL Cholesterol: 37 mg/dL      Total Cholesterol: 166 mg/dL  Results  LABS  A1c: 5.2 (05/2025)       Medications reviewed:  Current Medications[1]      Assessment & Plan    1. Essential hypertension (Primary)  -     amLODIPine Besylate; Take 1 tablet (10 mg total) by mouth daily.  Dispense: 90 tablet; Refill: 3  2. Hyperlipidemia, unspecified hyperlipidemia type  -     Rosuvastatin Calcium; Take 1 tablet (10 mg total) by mouth nightly. FOR CHOLESTEROL.  Dispense: 90 tablet; Refill: 3  3. Allergic reaction, subsequent encounter  -     Allergy Referral - In Network  -     Triamcinolone Acetonide; Apply 1 Application topically 2 (two) times daily for 10 days. Apply twice daily for 7-10 days. Do NOT use on face or in folds of skin.  Dispense: 15 g; Refill: 3  -     methylPREDNISolone; Take as directed with food.  Dispense: 1 each; Refill: 0  4. Rash  -     Derm Referral - In Network    Assessment & Plan  Allergic Reaction  Severe allergic reaction with airway involvement. Suspected exacerbation due to canine dander exposure. Awaiting allergist consultation.  - Prescribed Medrol Dosepak for future reactions.  - Ensure EpiPen availability.  - Refer to allergist Dr. Hernandez.  - Advise avoidance of canine exposure and use of air filters.    Persistent Rash  Persistent rash despite oral steroids. Possible unidentified causes. Dermatological evaluation considered.  - Prescribe high potency topical steroid cream.  - Refer to dermatologist Dr. Michele Ray if rash persists.    Hypertension  Hypertension well-managed with amlodipine. Blood pressure  controlled.  - Continue amlodipine 10 mg daily.  - Provide prescription refill for one year.    Hyperlipidemia  Hyperlipidemia managed with rosuvastatin. A1c indicates good glycemic control.  - Continue rosuvastatin 10 mg daily.  - Provide prescription refill for one year.    General Health Maintenance  Due for shingles vaccination. Planning travel to [place]. Advised on sun protection.  - Schedule nurse visit for shingles vaccination within a month.  - Advise use of sunblock and wearing long sleeves during travel.    Follow-up  Routine follow-up for chronic condition management.  - Schedule follow-up appointment before [facility] or [place].  - Ensure allergist and dermatologist appointments.       Follow Up: As needed/if symptoms worsen or No follow-ups on file..     Objective/ Results:   Physical Exam  Constitutional:       Appearance: He is well-developed.   Cardiovascular:      Rate and Rhythm: Normal rate and regular rhythm.      Heart sounds: Normal heart sounds.   Pulmonary:      Effort: Pulmonary effort is normal.      Breath sounds: Normal breath sounds.   Abdominal:      General: Bowel sounds are normal.      Palpations: Abdomen is soft.   Skin:     General: Skin is warm and dry.   Neurological:      Mental Status: He is alert and oriented to person, place, and time.      Deep Tendon Reflexes: Reflexes are normal and symmetric.        Physical Exam       Reviewed:    Patient Active Problem List    Diagnosis    Essential hypertension    Hyperlipidemia      Allergies[2]   Short Social Hx on File[3]   Review of Systems   Constitutional: Negative.    Respiratory: Negative.     Cardiovascular: Negative.    Gastrointestinal: Negative.    Skin: Negative.    Neurological: Negative.        All other systems negative unless otherwise stated in ROS or HPI above.       Martin Adams MD  Internal Medicine       Call office with any questions or seek emergency care if necessary.   Patient understands and agrees to  follow directions and advice.      ----------------------------------------- PATIENT INSTRUCTIONS-----------------------------------------     There are no Patient Instructions on file for this visit.        The 21st Century Cures Act makes medical notes available to patients in the interest of transparency.  However, please be advised that this is a medical document.  It is intended as a peer to peer communication.  It is written in medical language and may contain abbreviations or verbiage that are technical and unfamiliar.  It may appear blunt or direct.  Medical documents are intended to carry relevant information, facts as evident, and the clinical opinion of the practitioner.          [1]   Current Outpatient Medications   Medication Sig Dispense Refill    triamcinolone 0.5 % External Cream Apply 1 Application topically 2 (two) times daily for 10 days. Apply twice daily for 7-10 days. Do NOT use on face or in folds of skin. 15 g 3    methylPREDNISolone 4 MG Oral Tablet Therapy Pack Take as directed with food. 1 each 0    amLODIPine 10 MG Oral Tab Take 1 tablet (10 mg total) by mouth daily. 90 tablet 3    rosuvastatin 10 MG Oral Tab Take 1 tablet (10 mg total) by mouth nightly. FOR CHOLESTEROL. 90 tablet 3    albuterol (VENTOLIN HFA) 108 (90 Base) MCG/ACT Inhalation Aero Soln Inhale 2 puffs into the lungs every 4 (four) hours as needed for Wheezing. 1 each 0    EPINEPHrine (EPIPEN 2-ROSENDA) 0.3 MG/0.3ML Injection Solution Auto-injector Inject IM in event of  allergic reaction 1 each 0    methylPREDNISolone (MEDROL) 4 MG Oral Tablet Therapy Pack Dosepack: take as directed (Patient not taking: Reported on 6/25/2025) 1 each 0    methylPREDNISolone 4 MG Oral Tablet Therapy Pack Medrol dose rosenda, use as directed (Patient not taking: Reported on 6/25/2025)      levocetirizine 5 MG Oral Tab Take 1 tablet (5 mg total) by mouth in the morning and 1 tablet (5 mg total) before bedtime. (Patient not taking: Reported on  2025) 180 tablet 1    Zoster Vac Recomb Adjuvanted (SHINGRIX) 50 MCG/0.5ML Intramuscular Recon Susp Inject 1 each into the muscle Every 2 (two) months. (Patient not taking: Reported on 2025) 1 each 1   [2]   Allergies  Allergen Reactions    Citrated Pterylmonoglutamic Acid [Glutamic Acid] UNKNOWN    Dander UNKNOWN    [place] UNKNOWN    Ragweed UNKNOWN    [place] UNKNOWN    Lisinopril RASH   [3]   Social History  Socioeconomic History    Marital status:    Tobacco Use    Smoking status: Former     Current packs/day: 0.00     Types: Cigarettes     Quit date: 2008     Years since quittin.4     Passive exposure: Past    Smokeless tobacco: Former   Vaping Use    Vaping status: Never Used   Substance and Sexual Activity    Alcohol use: Yes     Alcohol/week: 3.0 standard drinks of alcohol     Types: 3 Cans of beer per week     Comment: maybe 3 beers a day    Drug use: Never

## 2025-06-27 NOTE — TELEPHONE ENCOUNTER
Family Medical Leave Act forms received with invalid Release of Information. No Release of Information available for patient's employer. Call patient once forms are completed. Logged for processing

## 2025-06-30 ENCOUNTER — OFFICE VISIT (OUTPATIENT)
Dept: OTOLARYNGOLOGY | Facility: CLINIC | Age: 65
End: 2025-06-30
Payer: COMMERCIAL

## 2025-06-30 ENCOUNTER — OFFICE VISIT (OUTPATIENT)
Dept: AUDIOLOGY | Facility: CLINIC | Age: 65
End: 2025-06-30
Payer: COMMERCIAL

## 2025-06-30 ENCOUNTER — HOSPITAL ENCOUNTER (OUTPATIENT)
Dept: ULTRASOUND IMAGING | Age: 65
Discharge: HOME | End: 2025-06-30
Attending: STUDENT IN AN ORGANIZED HEALTH CARE EDUCATION/TRAINING PROGRAM
Payer: COMMERCIAL

## 2025-06-30 VITALS — BODY MASS INDEX: 31.65 KG/M2 | WEIGHT: 190 LBS | HEIGHT: 65 IN

## 2025-06-30 DIAGNOSIS — H90.3 SENSORINEURAL HEARING LOSS (SNHL) OF BOTH EARS: Primary | ICD-10-CM

## 2025-06-30 DIAGNOSIS — J34.2 NASAL SEPTAL DEVIATION: ICD-10-CM

## 2025-06-30 DIAGNOSIS — H91.8X3 ASYMMETRICAL HEARING LOSS: ICD-10-CM

## 2025-06-30 DIAGNOSIS — E03.8 SUBCLINICAL HYPOTHYROIDISM: ICD-10-CM

## 2025-06-30 DIAGNOSIS — H91.93 BILATERAL HEARING LOSS, UNSPECIFIED HEARING LOSS TYPE: Primary | ICD-10-CM

## 2025-06-30 PROCEDURE — 76536 US EXAM OF HEAD AND NECK: CPT | Performed by: STUDENT IN AN ORGANIZED HEALTH CARE EDUCATION/TRAINING PROGRAM

## 2025-06-30 NOTE — PATIENT INSTRUCTIONS
You have a hearing loss in both ears slightly worse on the right than the left.  Word discrimination score is 95% on the right and 80% on the left.  A repeat audiogram should be done in 1 years time, sooner as problems as there are no old audiograms for comparison.  You would benefit from hearing aids in both ears.

## 2025-06-30 NOTE — PROGRESS NOTES
Karel Ford is a 64 year old male.   Chief Complaint   Patient presents with    Consult    Hearing Loss     Pt is here for hearing loss, unable to hear out of both ears, pt states hears a lot of swooshing sounds.      HPI:   Patient here with hearing loss.  He has worked in noise.    Current Medications[1]   Past Medical History[2]   Social History:  Short Social Hx on File[3]     REVIEW OF SYSTEMS:   GENERAL HEALTH: feels well otherwise  GENERAL : denies fever, chills, sweats, weight loss, weight gain  SKIN: denies any unusual skin lesions or rashes  RESPIRATORY: denies shortness of breath with exertion  NEURO: denies headaches    EXAM:   Ht 5' 5\" (1.651 m)   Wt 190 lb (86.2 kg)   BMI 31.62 kg/m²   System Details   Skin Inspection - Normal.   Constitutional Overall appearance - Normal.   Head/Face Facial features - Normal. Eyebrows - Normal. Skull - Normal.   Eyes Conjunctiva - Right: Normal, Left: Normal. Pupil - Right: Normal, Left: Normal.    Ears Inspection - Right: Normal, Left: Normal.   Canal - Right: Normal, Left: Normal.   TM - Right: Normal, Left: Normal.  No middle ear fluid either ear   Nasal External nose - Normal.   Nasal septum -left septal deviation.  Turbinates - Normal.   Oral/Oropharynx Lips - Normal, Tonsils - Normal, Tongue - Normal    Neck Exam Inspection - Normal. Palpation - Normal. Parotid gland - Normal. Thyroid gland - Normal.  No carotid bruits by auscultation   Lymph Detail Submental. Submandibular. Anterior cervical. Posterior cervical. Supraclavicular all without enlargement   Psychiatric Orientation - Oriented to time, place, person & situation. Appropriate mood and affect.   Neurological Memory - Normal. Cranial nerves - Cranial nerves II through XII grossly intact.     ASSESSMENT AND PLAN:   1. Bilateral hearing loss, unspecified hearing loss type  Audiogram reviewed with the patient and his wife at the time of the visit.  There is a slight asymmetric hearing loss right worse than  left.  Word discrimination score is however better on the right at 95% versus 80% on the left.  No old audiograms for comparison.  Patient to repeat the audiogram in 1 years time, sooner if problems.  He would benefit from binaural hearing aids.  - Audiology Referral - [place] ([facility])    2. Nasal septal deviation  To the left.    3. Asymmetrical hearing loss  Right slightly worse than left      The patient indicates understanding of these issues and agrees to the plan.      Kimberlee Montilla MD  6/30/2025  4:33 PM       [1]   Current Outpatient Medications   Medication Sig Dispense Refill    triamcinolone 0.5 % External Cream Apply 1 Application topically 2 (two) times daily for 10 days. Apply twice daily for 7-10 days. Do NOT use on face or in folds of skin. 15 g 3    methylPREDNISolone 4 MG Oral Tablet Therapy Pack Take as directed with food. 1 each 0    amLODIPine 10 MG Oral Tab Take 1 tablet (10 mg total) by mouth daily. 90 tablet 3    rosuvastatin 10 MG Oral Tab Take 1 tablet (10 mg total) by mouth nightly. FOR CHOLESTEROL. 90 tablet 3    EPINEPHrine (EPIPEN 2-ROSENDA) 0.3 MG/0.3ML Injection Solution Auto-injector Inject IM in event of  allergic reaction 1 each 0    albuterol (VENTOLIN HFA) 108 (90 Base) MCG/ACT Inhalation Aero Soln Inhale 2 puffs into the lungs every 4 (four) hours as needed for Wheezing. 1 each 0    methylPREDNISolone (MEDROL) 4 MG Oral Tablet Therapy Pack Dosepack: take as directed (Patient not taking: Reported on 6/25/2025) 1 each 0    methylPREDNISolone 4 MG Oral Tablet Therapy Pack Medrol dose rosenda, use as directed (Patient not taking: Reported on 6/25/2025)      levocetirizine 5 MG Oral Tab Take 1 tablet (5 mg total) by mouth in the morning and 1 tablet (5 mg total) before bedtime. (Patient not taking: Reported on 6/30/2025) 180 tablet 1    Zoster Vac Recomb Adjuvanted (SHINGRIX) 50 MCG/0.5ML Intramuscular Recon Susp Inject 1 each into the muscle Every 2 (two) months. (Patient not  taking: Reported on 2025) 1 each 1   [2]   Past Medical History:   Essential hypertension    Hyperlipidemia   [3]   Social History  Socioeconomic History    Marital status:    Tobacco Use    Smoking status: Former     Current packs/day: 0.00     Types: Cigarettes     Quit date: 2008     Years since quittin.5     Passive exposure: Past    Smokeless tobacco: Former   Vaping Use    Vaping status: Never Used   Substance and Sexual Activity    Alcohol use: Yes     Alcohol/week: 3.0 standard drinks of alcohol     Types: 3 Cans of beer per week     Comment: maybe 3 beers a day    Drug use: Never

## 2025-07-06 ENCOUNTER — HOSPITAL ENCOUNTER (INPATIENT)
Facility: HOSPITAL | Age: 65
LOS: 6 days | Discharge: HOME | End: 2025-07-12
Attending: EMERGENCY MEDICINE | Admitting: HOSPITALIST
Payer: COMMERCIAL

## 2025-07-06 ENCOUNTER — APPOINTMENT (OUTPATIENT)
Dept: GENERAL RADIOLOGY | Facility: HOSPITAL | Age: 65
End: 2025-07-06
Attending: EMERGENCY MEDICINE
Payer: COMMERCIAL

## 2025-07-06 DIAGNOSIS — E83.52 HYPERCALCEMIA: ICD-10-CM

## 2025-07-06 DIAGNOSIS — R74.01 TRANSAMINITIS: ICD-10-CM

## 2025-07-06 DIAGNOSIS — R59.0 MEDIASTINAL LYMPHADENOPATHY: Primary | ICD-10-CM

## 2025-07-06 DIAGNOSIS — N17.9 AKI (ACUTE KIDNEY INJURY): ICD-10-CM

## 2025-07-06 DIAGNOSIS — J18.9 MULTIFOCAL PNEUMONIA: ICD-10-CM

## 2025-07-06 DIAGNOSIS — J96.01 ACUTE RESPIRATORY FAILURE WITH HYPOXIA (HCC): ICD-10-CM

## 2025-07-06 LAB
ALBUMIN SERPL-MCNC: 3.2 G/DL (ref 3.2–4.8)
ALP LIVER SERPL-CCNC: 636 U/L (ref 45–117)
ALT SERPL-CCNC: 85 U/L (ref 10–49)
ANION GAP SERPL CALC-SCNC: 6 MMOL/L (ref 0–18)
AST SERPL-CCNC: 78 U/L (ref ?–34)
BASOPHILS # BLD AUTO: 0.04 X10(3) UL (ref 0–0.2)
BASOPHILS NFR BLD AUTO: 0.4 %
BILIRUB DIRECT SERPL-MCNC: 0.4 MG/DL (ref ?–0.3)
BILIRUB SERPL-MCNC: 0.6 MG/DL (ref 0.2–1.1)
BILIRUB UR QL: NEGATIVE
BUN BLD-MCNC: 39 MG/DL (ref 9–23)
BUN/CREAT SERPL: 22.2 (ref 10–20)
CALCIUM BLD-MCNC: 13 MG/DL (ref 8.7–10.4)
CALCIUM BLD-MCNC: 13.5 MG/DL (ref 8.7–10.4)
CHLORIDE SERPL-SCNC: 99 MMOL/L (ref 98–112)
CO2 SERPL-SCNC: 27 MMOL/L (ref 21–32)
COLOR UR: YELLOW
CREAT BLD-MCNC: 1.71 MG/DL (ref 0.7–1.3)
CREAT BLD-MCNC: 1.76 MG/DL (ref 0.7–1.3)
DEPRECATED RDW RBC AUTO: 49.1 FL (ref 35.1–46.3)
EGFRCR SERPLBLD CKD-EPI 2021: 43 ML/MIN/1.73M2 (ref 60–?)
EOSINOPHIL # BLD AUTO: 0.46 X10(3) UL (ref 0–0.7)
EOSINOPHIL NFR BLD AUTO: 4.5 %
ERYTHROCYTE [DISTWIDTH] IN BLOOD BY AUTOMATED COUNT: 15 % (ref 11–15)
FLUAV + FLUBV RNA SPEC NAA+PROBE: NEGATIVE
FLUAV + FLUBV RNA SPEC NAA+PROBE: NEGATIVE
GLUCOSE BLD-MCNC: 103 MG/DL (ref 70–99)
GLUCOSE UR-MCNC: NORMAL MG/DL
HCT VFR BLD AUTO: 40 % (ref 39–53)
HGB BLD-MCNC: 13 G/DL (ref 13–17.5)
HGB UR QL STRIP.AUTO: NEGATIVE
HYALINE CASTS #/AREA URNS AUTO: PRESENT /LPF
IMM GRANULOCYTES # BLD AUTO: 0.21 X10(3) UL (ref 0–1)
IMM GRANULOCYTES NFR BLD: 2.1 %
KETONES UR-MCNC: NEGATIVE MG/DL
L PNEUMO AG UR QL: NEGATIVE
LACTATE SERPL-SCNC: 2 MMOL/L (ref 0.5–2)
LEUKOCYTE ESTERASE UR QL STRIP.AUTO: 75
LIPASE SERPL-CCNC: 19 U/L (ref 12–53)
LYMPHOCYTES # BLD AUTO: 2.35 X10(3) UL (ref 1–4)
LYMPHOCYTES NFR BLD AUTO: 23.2 %
MAGNESIUM SERPL-MCNC: 2 MG/DL (ref 1.6–2.6)
MCH RBC QN AUTO: 28.6 PG (ref 26–34)
MCHC RBC AUTO-ENTMCNC: 32.5 G/DL (ref 31–37)
MCV RBC AUTO: 88.1 FL (ref 80–100)
MONOCYTES # BLD AUTO: 0.58 X10(3) UL (ref 0.1–1)
MONOCYTES NFR BLD AUTO: 5.7 %
NEUTROPHILS # BLD AUTO: 6.47 X10 (3) UL (ref 1.5–7.7)
NEUTROPHILS # BLD AUTO: 6.47 X10(3) UL (ref 1.5–7.7)
NEUTROPHILS NFR BLD AUTO: 64.1 %
NITRITE UR QL STRIP.AUTO: NEGATIVE
NT-PROBNP SERPL-MCNC: 299 PG/ML (ref ?–125)
OSMOLALITY SERPL CALC.SUM OF ELEC: 284 MOSM/KG (ref 275–295)
PH UR: 5.5 [PH] (ref 5–8)
PHOSPHATE SERPL-MCNC: 4.2 MG/DL (ref 2.4–5.1)
PLATELET # BLD AUTO: 214 10(3)UL (ref 150–450)
POTASSIUM SERPL-SCNC: 4.2 MMOL/L (ref 3.5–5.1)
PROT SERPL-MCNC: 8.9 G/DL (ref 5.7–8.2)
PROT UR-MCNC: 20 MG/DL
PTH-INTACT SERPL-MCNC: <6.3 PG/ML (ref 18.5–88)
RBC # BLD AUTO: 4.54 X10(6)UL (ref 4.3–5.7)
RSV RNA SPEC NAA+PROBE: NEGATIVE
SARS-COV-2 RNA RESP QL NAA+PROBE: NOT DETECTED
SODIUM SERPL-SCNC: 132 MMOL/L (ref 136–145)
SP GR UR STRIP: 1.02 (ref 1–1.03)
UROBILINOGEN UR STRIP-ACNC: NORMAL
WBC # BLD AUTO: 10.1 X10(3) UL (ref 4–11)

## 2025-07-06 PROCEDURE — 71045 X-RAY EXAM CHEST 1 VIEW: CPT | Performed by: RADIOLOGY

## 2025-07-06 PROCEDURE — 99223 1ST HOSP IP/OBS HIGH 75: CPT | Performed by: HOSPITALIST

## 2025-07-06 RX ORDER — METHYLPREDNISOLONE SODIUM SUCCINATE 125 MG/2ML
125 INJECTION INTRAMUSCULAR; INTRAVENOUS ONCE
Status: COMPLETED | OUTPATIENT
Start: 2025-07-06 | End: 2025-07-06

## 2025-07-06 RX ORDER — ALBUTEROL SULFATE 0.83 MG/ML
2.5 SOLUTION RESPIRATORY (INHALATION) ONCE
Status: COMPLETED | OUTPATIENT
Start: 2025-07-06 | End: 2025-07-06

## 2025-07-06 RX ORDER — ECHINACEA PURPUREA EXTRACT 125 MG
1 TABLET ORAL
Status: DISCONTINUED | OUTPATIENT
Start: 2025-07-06 | End: 2025-07-12

## 2025-07-06 RX ORDER — HEPARIN SODIUM 5000 [USP'U]/ML
5000 INJECTION, SOLUTION INTRAVENOUS; SUBCUTANEOUS EVERY 12 HOURS SCHEDULED
Status: DISCONTINUED | OUTPATIENT
Start: 2025-07-06 | End: 2025-07-12

## 2025-07-06 RX ORDER — METHYLPREDNISOLONE SODIUM SUCCINATE 125 MG/2ML
60 INJECTION INTRAMUSCULAR; INTRAVENOUS EVERY 8 HOURS
Status: DISCONTINUED | OUTPATIENT
Start: 2025-07-06 | End: 2025-07-08

## 2025-07-06 RX ORDER — DIPHENHYDRAMINE HCL 25 MG
25 CAPSULE ORAL EVERY 6 HOURS PRN
COMMUNITY

## 2025-07-06 RX ORDER — SODIUM CHLORIDE 9 MG/ML
INJECTION, SOLUTION INTRAVENOUS CONTINUOUS
Status: DISCONTINUED | OUTPATIENT
Start: 2025-07-06 | End: 2025-07-10

## 2025-07-06 RX ORDER — IPRATROPIUM BROMIDE AND ALBUTEROL SULFATE 2.5; .5 MG/3ML; MG/3ML
3 SOLUTION RESPIRATORY (INHALATION) EVERY 6 HOURS PRN
Status: DISCONTINUED | OUTPATIENT
Start: 2025-07-06 | End: 2025-07-12

## 2025-07-06 RX ORDER — AZITHROMYCIN 250 MG/1
500 TABLET, FILM COATED ORAL DAILY
Status: COMPLETED | OUTPATIENT
Start: 2025-07-07 | End: 2025-07-10

## 2025-07-06 RX ORDER — BENZONATATE 100 MG/1
200 CAPSULE ORAL 3 TIMES DAILY PRN
Status: DISCONTINUED | OUTPATIENT
Start: 2025-07-06 | End: 2025-07-12

## 2025-07-06 RX ORDER — SODIUM CHLORIDE 9 MG/ML
INJECTION, SOLUTION INTRAVENOUS CONTINUOUS
Status: DISCONTINUED | OUTPATIENT
Start: 2025-07-06 | End: 2025-07-06

## 2025-07-06 RX ORDER — IPRATROPIUM BROMIDE AND ALBUTEROL SULFATE 2.5; .5 MG/3ML; MG/3ML
3 SOLUTION RESPIRATORY (INHALATION)
Status: DISCONTINUED | OUTPATIENT
Start: 2025-07-06 | End: 2025-07-12

## 2025-07-06 NOTE — ED QUICK NOTES
Family states pt has been \"in and out\" of emergency departments and his primary office for the same issues. Pt's family states he has been to ED 3 times and is progressively getting worse. Pt has no chronic lung issues and does not normally wear O2. Pt currently needing 3L O2 to maintain 92% Spo2.

## 2025-07-06 NOTE — H&P
[facility]  part of [facility]    History & Physical    Karel Ford Patient Status:  Emergency    10/6/1960 MRN [id number]   Location [facility] EMERGENCY DEPARTMENT Attending Michele Reddy MD   Hosp Day # 0 PCP Martin Adams MD     Date:  2025  Date of Admission:  2025    History provided by:patient  Chief Complaint:     Chief Complaint   Patient presents with    Cough/URI       HPI:   Karel Ford is a(n) 64 year old male with a PMH of HTN, recent Er visit for allergic reaction s/p medrol dose pack who now presents with cough, fevers and sob since Friday.  He denies any chest pain.  No sick contacts.  No n/v/abd pain.  In the ED CXR showed multifocal pneumonia.  He was also found to be hypoxic on exam.  Improved with NC.  He will be admitted.    History   Past Medical History[1]  Past Surgical History[2]  Family History[3]  Social History:  Short Social Hx on File[4]  Allergies/Medications:   Allergies: Allergies[5]  Prescriptions Prior to Admission[6]    Review of Systems:   Pertinent items are noted in HPI.  12 ROS completed and otherwise negative    Physical Exam:   Vital Signs:  Blood pressure 112/69, pulse 92, temperature 99 °F (37.2 °C), temperature source Oral, resp. rate 24, weight 190 lb 0.6 oz (86.2 kg), SpO2 92%.     Gen: uncomfortable  Pulm: coarse bs  CV: Heart with regular rate and rhythm  Abd: Abdomen soft, nontender, nondistended, bowel sounds present  Neuro: No acute focal deficits  MSK: moves extremities  Skin: Warm and dry  Psych: Normal affect  Ext: no c/c/e          Results:     Lab Results   Component Value Date    WBC 10.1 2025    HGB 13.0 2025    HCT 40.0 2025    .0 2025    CREATSERUM 1.71 (H) 2025    BUN 39 (H) 2025     (L) 2025    K 4.2 2025    CL 99 2025    CO2 27.0 2025     (H) 2025    CA 13.0 (HH) 2025    ALB 3.2 2025    ALKPHO 636 (H) 2025     BILT 0.6 07/06/2025    TP 8.9 (H) 07/06/2025    AST 78 (H) 07/06/2025    ALT 85 (H) 07/06/2025    T4F 1.0 05/12/2025    TSH 2.119 05/20/2025    LIP 19 07/06/2025    PSA 0.8 04/17/2017    MG 2.0 07/06/2025    PHOS 4.2 07/06/2025       XR CHEST AP PORTABLE  (CPT=71045)  Result Date: 7/6/2025  CONCLUSION: Multifocal bilateral patchy opacities may represent pneumonia or edema. Electronically Verified and Signed by Attending Radiologist: Atif Daigle MD 7/6/2025 1:58 PM Workstation: [employer]    EKG  Result Date: 7/6/2025  Normal sinus rhythm Nonspecific T wave abnormality Abnormal ECG When compared with ECG of 19-AUG-2023 10:47, Nonspecific T wave abnormality now evident in Anterior-lateral leads      Assessment/Plan:       Multifocal pneumonia, possible gram neg pneumonia  - started broad spectrum IV abx  - await urine legionella  - sputum and blood cxs pending  - pulm consulted    Possible acute copd exacerbation  - pt has a smoking history, not currently tobacco user  - started nebs and steroids  - abx as above  - pulm on consult    Hypercalcemia  - started IVFs  - checking PTH  - recheck in am    Acute kidney injury   - possibly prerenal?  - started IVFs  - recheck in am    Acute transaminitis  - check abd us  - trend lfts  - pt used to be an alcoholic but previous lfts were wnl - will monitor cloesly    DM  - accuchecks,  ISS    HTN  - bp stable without meds, monitor for now    HL  - hold statin given elevated lfts    Supplementary Documentation:   DVT Mechanical Prophylaxis:        DVT Pharmacologic Prophylaxis   Medication   None                Code Status: Not on file  [place]: No urinary catheter in place  [place] Duration (in days):   Central line:    MYLES:                            Tawana Mireles MD  7/6/2025        Greater than 75 minutes spent in preparation of this admission in examining patient, obtaining history, reviewing records and d/w patient/family/consultants.         [1]   Past Medical  History:   Essential hypertension    Hyperlipidemia   [2] History reviewed. No pertinent surgical history.  [3]   Family History  Problem Relation Age of Onset    Lung Disorder Mother     Heart Disease Father    [4]   Social History  Socioeconomic History    Marital status:    Tobacco Use    Smoking status: Former     Current packs/day: 0.00     Types: Cigarettes     Quit date: 2008     Years since quittin.5     Passive exposure: Past    Smokeless tobacco: Former   Vaping Use    Vaping status: Never Used   Substance and Sexual Activity    Alcohol use: Yes     Alcohol/week: 3.0 standard drinks of alcohol     Types: 3 Cans of beer per week     Comment: maybe 3 beers a day    Drug use: Never   [5]   Allergies  Allergen Reactions    Citrated Pterylmonoglutamic Acid [Glutamic Acid] UNKNOWN    Dander UNKNOWN    [place] UNKNOWN    Ragweed UNKNOWN    [place]ough [place] UNKNOWN    Lisinopril RASH   [6] (Not in a hospital admission)

## 2025-07-06 NOTE — ED INITIAL ASSESSMENT (HPI)
Pt to ED with wife with c/o cough and fever since Friday. +productive cough without noted blood per patient. Pt denies chest pain. No respiratory distress noted. Pt ambulating by self with steady gait.

## 2025-07-06 NOTE — ED QUICK NOTES
Orders for admission, patient is aware of plan and ready to go upstairs. Any questions, please call ED RN Coty at extension [phone].     Patient Covid vaccination status: Fully vaccinated     COVID Test Ordered in ED: SARS-CoV-2/Flu A and B/RSV by PCR (GeneXpert)    COVID Suspicion at Admission: N/A    Running Infusions: Medication Infusions[1]     Mental Status/LOC at time of transport: Aox3-4 (confused/out of it per family)    Other pertinent information: 3L O2 via NC  CIWA score: N/A   NIH score:  N/A             [1]    sodium chloride

## 2025-07-06 NOTE — ED QUICK NOTES
Rounding Completed    Plan of Care reviewed. Waiting for Admission.  Elimination needs assessed.  Patient resting in bed, speaking in full sentences. Pt on cardiac monitor for frequent VS assessments. No new requests at this time.     Pt ambulatory with steady gait to BR at this time.     Bed is locked and in lowest position. Call light within reach.

## 2025-07-06 NOTE — ED PROVIDER NOTES
Patient Seen in: [facility] Emergency Department        History  Chief Complaint   Patient presents with    Cough/URI     Stated Complaint: URI    Subjective:   HPI                  Objective:     Past Medical History:    Essential hypertension    Hyperlipidemia              History reviewed. No pertinent surgical history.             Social History     Socioeconomic History    Marital status:    Tobacco Use    Smoking status: Former     Current packs/day: 0.00     Types: Cigarettes     Quit date: 2008     Years since quittin.5     Passive exposure: Past    Smokeless tobacco: Former   Vaping Use    Vaping status: Never Used   Substance and Sexual Activity    Alcohol use: Yes     Alcohol/week: 3.0 standard drinks of alcohol     Types: 3 Cans of beer per week     Comment: maybe 3 beers a day    Drug use: Never     Social Drivers of Health     Food Insecurity: No Food Insecurity (2025)    NCSS - Food Insecurity     Worried About Running Out of Food in the Last Year: No     Ran Out of Food in the Last Year: No   Transportation Needs: No Transportation Needs (2025)    NCSS - Transportation     Lack of Transportation: No   Housing Stability: Not At Risk (2025)    NCSS - Housing/Utilities     Has Housing: Yes     Worried About Losing Housing: No     Unable to Get Utilities: No                                Physical Exam    ED Triage Vitals [25 1149]   /35   Pulse 101   Resp 22   Temp 99 °F (37.2 °C)   Temp src Oral   SpO2 92 %   O2 Device None (Room air)       Current Vitals:   Vital Signs  BP: 123/75  Pulse: 57  Resp: 18  Temp: 98.1 °F (36.7 °C)  Temp src: Oral  MAP (mmHg): 90    Oxygen Therapy  SpO2: 94 %  O2 Device: Nasal cannula  O2 Flow Rate (L/min): 1 L/min            Physical Exam            ED Course  Labs Reviewed   BASIC METABOLIC PANEL (8) - Abnormal; Notable for the following components:       Result Value    Glucose 103 (*)     Sodium 132 (*)     BUN 39 (*)      Creatinine 1.76 (*)     BUN/CREA Ratio 22.2 (*)     Calcium, Total 13.5 (*)     eGFR-Cr 43 (*)     All other components within normal limits   HEPATIC FUNCTION PANEL (7) - Abnormal; Notable for the following components:    AST 78 (*)     ALT 85 (*)     Alkaline Phosphatase 636 (*)     Bilirubin, Direct 0.4 (*)     Total Protein 8.9 (*)     All other components within normal limits   CBC WITH DIFFERENTIAL WITH PLATELET - Abnormal; Notable for the following components:    RDW-SD 49.1 (*)     All other components within normal limits   PRO BETA NATRIURETIC PEPTIDE - Abnormal; Notable for the following components:    Pro-Beta Natriuretic Peptide 299 (*)     All other components within normal limits   PTH INTACT WITH MINERALS - Abnormal; Notable for the following components:    Creatinine 1.71 (*)     Calcium, Total 13.0 (*)     Pth Intact <6.3 (*)     All other components within normal limits   URINALYSIS WITH CULTURE REFLEX - Abnormal; Notable for the following components:    Clarity Urine Turbid (*)     Protein Urine 20 (*)     Leukocyte Esterase Urine 75 (*)     WBC Urine 21-50 (*)     Bacteria Urine Rare (*)     Squamous Epi. Cells Few (*)     Transitional Cells Few (*)     Hyaline Casts Present (*)     All other components within normal limits   BASIC METABOLIC PANEL (8) - Abnormal; Notable for the following components:    Glucose 154 (*)     BUN 28 (*)     BUN/CREA Ratio 24.6 (*)     Calcium, Total 10.6 (*)     All other components within normal limits   CBC WITH DIFFERENTIAL WITH PLATELET - Abnormal; Notable for the following components:    RBC 4.17 (*)     HGB 12.0 (*)     HCT 36.6 (*)     RDW-SD 48.8 (*)     RDW 15.2 (*)     All other components within normal limits   HEPATIC FUNCTION PANEL (7) - Abnormal; Notable for the following components:    AST 41 (*)     ALT 70 (*)     Alkaline Phosphatase 521 (*)     Albumin 2.7 (*)     All other components within normal limits   HEPATITIS A B + C PROFILE WITH PATHOLOGY  INTERPRETATION - Abnormal; Notable for the following components:    Hepatitis A Virus Ab, Total Reactive (*)     Hepatitis B Surface Antibody Nonreactive (*)     All other components within normal limits   CALCIUM - Abnormal; Notable for the following components:    Calcium, Total 11.1 (*)     All other components within normal limits   VITAMIN D, 25-HYDROXY - Abnormal; Notable for the following components:    Vitamin D, 25OH, Total 199.7 (*)     All other components within normal limits   VITAMIN D, 1,25 DIHYDROXY - Abnormal; Notable for the following components:    Vit D 1,25 di-.0 (*)     All other components within normal limits    Narrative:     Performed at:  [address]  [id number]  : Asif Zheng MD, Phone:  [phone]   ALBUMIN SERUM - Abnormal; Notable for the following components:    Albumin 2.8 (*)     All other components within normal limits   MAGNESIUM - Abnormal; Notable for the following components:    Magnesium 1.5 (*)     All other components within normal limits   HEPATIC FUNCTION PANEL (7) - Abnormal; Notable for the following components:    Alkaline Phosphatase 410 (*)     Albumin 2.6 (*)     All other components within normal limits   BASIC METABOLIC PANEL (8) - Abnormal; Notable for the following components:    Glucose 140 (*)     BUN 25 (*)     BUN/CREA Ratio 29.1 (*)     Calculated Osmolality 301 (*)     All other components within normal limits   CBC, PLATELET; NO DIFFERENTIAL - Abnormal; Notable for the following components:    RBC 3.50 (*)     HGB 10.2 (*)     HCT 31.1 (*)     RDW 15.3 (*)     RDW-SD 50.2 (*)     All other components within normal limits   COMP METABOLIC PANEL (14) - Abnormal; Notable for the following components:    Glucose 107 (*)     BUN/CREA Ratio 28.2 (*)     Calculated Osmolality 302 (*)     ALT 52 (*)     AST 39 (*)     Alkaline Phosphatase 336 (*)     Albumin 2.6 (*)     Globulin  4.7 (*)     A/G  Ratio 0.6 (*)     All other components within normal limits   CBC, PLATELET; NO DIFFERENTIAL - Abnormal; Notable for the following components:    RBC 3.44 (*)     HGB 9.9 (*)     HCT 31.3 (*)     RDW 15.6 (*)     RDW-SD 51.8 (*)     All other components within normal limits   LACTIC ACID, PLASMA - Normal   MAGNESIUM - Normal   LIPASE - Normal   LEGIONELLA URINE AG SEROGRP 1 - Normal    Narrative:     Presumptive negative for Legionella pneumophila serogroup 1 antigen in urine, suggesting no recent or current infection. Infection due to Legionella cannot be ruled out since other serogroups and species may cause disease, antigen may not be present in early infection, or the level of antigen present in the urine may be below the detection limit of the test.   MAGNESIUM - Normal   CEA - Normal   AFP, TUMOR MARKER, SERUM - Normal   HEP A AB, IGM - Normal   CALCIUM - Normal   LDH - Normal   URIC ACID - Normal   MAGNESIUM - Normal   PHOSPHORUS - Normal   SARS-COV-2/FLU A AND B/RSV BY PCR (GENEXPERT) - Normal    Narrative:     This test is intended for the qualitative detection and differentiation of SARS-CoV-2, influenza A, influenza B, and respiratory syncytial virus (RSV) viral RNA in nasopharyngeal or nares swabs from individuals suspected of respiratory viral infection consistent with COVID-19 by their healthcare provider. Signs and symptoms of respiratory viral infection due to SARS-CoV-2, influenza, and RSV can be similar.    Test performed using the Xpert Xpress SARS-CoV-2/FLU/RSV (real time RT-PCR)  assay on the GeneXpert instrument, [employer], [place] [address].   This test is being used under the Food and Drug Administration's Emergency Use Authorization.    The authorized Fact Sheet for Healthcare Providers for this assay is available upon request from the laboratory.   C. DIFFICILE(TOXIGENIC)PCR - Normal    Narrative:     Potentially interfering substances include, but are not limited to, blood and mucus.  Suggest repeat specimen.   C. DIFFICILE(TOXIGENIC)PCR - Normal    Narrative:     Potentially interfering substances include, but are not limited to, blood and mucus. Suggest repeat specimen.   C. DIFFICILE(TOXIGENIC)PCR - Normal   SCAN SLIDE   SCAN SLIDE   VITAMIN D    Narrative:     The following orders were created for panel order Vitamin D.  Procedure                               Abnormality         Status                     ---------                               -----------         ------                     Vitamin D, 25-Hydroxy[[id number]]        Abnormal            Final result                 Please view results for these tests on the individual orders.   VITAMIN D    Narrative:     The following orders were created for panel order Vitamin D.  Procedure                               Abnormality         Status                     ---------                               -----------         ------                     Vitamin D, 1,25 Dihydroxy[[id number]]    Abnormal            Final result                 Please view results for these tests on the individual orders.   CORTISOL   PARATHYROID HORMONE-RELATED PEPTIDE   PARATHYROID HORMONE-RELATED PEPTIDE   MONOCLONAL PROTEIN STUDY    Narrative:     The following orders were created for panel order Monoclonal Protein Study.  Procedure                               Abnormality         Status                     ---------                               -----------         ------                     SERUM MONOCLONAL PROTEIN...[[id number]]                      In process                   Please view results for these tests on the individual orders.   SERUM MONOCLONAL PROTEIN STUDY   PARATHYROID HORMONE-RELATED PEPTIDE   RAINBOW DRAW BLUE   RAINBOW DRAW LAVENDER   BLOOD CULTURE    Narrative:     Aerobic Bottle Volume - 10 mL  Anaerobic Bottle Volume - 10 mL   BLOOD CULTURE   SPUTUM CULTURE   URINE CULTURE, ROUTINE                       EKG Interpretation  Rate:  89  Rhythm: sinus rhythm  Interpretation: normal sinus rhythm without acute sign of ischemia or other abnormal intervals.        MDM     64-year-old male with history of hypertension and hyperlipidemia and who recently has had some issues with allergic reactions of unknown etiology with leg swelling, redness, and shortness of breath presents today with cough, fever, fatigue, and confusion.  Family reports that symptoms been worsening over the last 3 days.  He is also had a small amount hemoptysis.  They deny any current leg swelling, chest pain, or other symptoms.    On exam, hypoxic to the high 80s on room air, slightly dyspneic, bilateral lower lung crackles, no significant extremity edema/swelling,    Differential: Pneumonia, viral URI, CHF,    Patient evaluated with labs which showed likely prerenal CAROL, hypercalcemia at 13.5, mild liver transaminase elevation, mild proBNP elevation, no leukocytosis  Parathyroid hormones ordered.    I personally reviewed and interpreted the patient's chest x-ray which showed likely multifocal pneumonia    Patient ordered for IV fluids, ceftriaxone, and azithromycin.  He was also given albuterol nebulizer and Solu-Medrol for wheezing    On reexamination, vitals remained stable and symptoms improved.  Patient remains on liters nasal cannula.    Pulmonology consulted without immediate recommendations.    Patient admitted for further management.    Admission disposition: 7/6/2025  2:18 PM           Medical Decision Making      Disposition and Plan     Clinical Impression:  1. Multifocal pneumonia    2. Acute respiratory failure with hypoxia (HCC)    3. CAROL (acute kidney injury)    4. Hypercalcemia         Disposition:  Admit  7/6/2025  2:18 pm    Follow-up:  [facility]  [address] [place] [id number]  [phone]  Schedule an appointment as soon as possible for a visit  Pneumonia follow up    Martin Adams MD  [address] [place] [address]  [address]  [place] [address]  [phone]    Schedule an appointment as soon as possible for a visit in 1 week(s)            Medications Prescribed:  Current Discharge Medication List                Supplementary Documentation:         Hospital Problems       Present on Admission  Date Reviewed: 6/30/2025          ICD-10-CM Noted POA    * (Principal) Multifocal pneumonia J18.9 7/6/2025 Unknown    Acute respiratory failure with hypoxia (HCC) J96.01 7/6/2025 Unknown    CAROL (acute kidney injury) N17.9 7/6/2025 Unknown    Hypercalcemia E83.52 7/6/2025 Unknown    Mediastinal lymphadenopathy R59.0 7/8/2025 Unknown

## 2025-07-06 NOTE — PLAN OF CARE
Pt is A&Ox 4 - lethargic and confused at times, 4L O2 via NC, tolerating cardiac diet - to be NPO at midnight for abdominal US tomorrow, voiding freely - allison OP, denies pain at this time, ambulating standby assist. Remote tele in place. Call light within reach, calls appropriately, I-bed awareness/alarm on.       Problem: Patient Centered Care  Goal: Patient preferences are identified and integrated in the patient's plan of care  Description: Interventions:  - What would you like us to know as we care for you? Family support present  - Provide timely, complete, and accurate information to patient/family  - Incorporate patient and family knowledge, values, beliefs, and cultural backgrounds into the planning and delivery of care  - Encourage patient/family to participate in care and decision-making at the level they choose  - Honor patient and family perspectives and choices  7/6/2025 1743 by Sharifa Botello  Outcome: Progressing  7/6/2025 1743 by Sharifa Botello  Outcome: Progressing     Problem: Patient/Family Goals  Goal: Patient/Family Long Term Goal  Description: Patient's Long Term Goal: return home     Interventions:  - improve O2 - decrease supplemental O2 needs, tolerate discomfort  - See additional Care Plan goals for specific interventions  7/6/2025 1743 by Sharifa Botello  Outcome: Progressing  7/6/2025 1743 by Sharifa Botello  Outcome: Progressing  Goal: Patient/Family Short Term Goal  Description: Patient's Short Term Goal: improve O2 saturation     Interventions:   - O2 via NC, PRN nebs, Sched steroids   - See additional Care Plan goals for specific interventions  7/6/2025 1743 by Sharifa Botello  Outcome: Progressing  7/6/2025 1743 by Sharifa Botello  Outcome: Progressing     Problem: RISK FOR INFECTION - ADULT  Goal: Absence of fever/infection during anticipated neutropenic period  Description: INTERVENTIONS  - Monitor WBC  - Administer growth factors as ordered  - Implement neutropenic  guidelines  Outcome: Progressing     Problem: SAFETY ADULT - FALL  Goal: Free from fall injury  Description: INTERVENTIONS:  - Assess pt frequently for physical needs  - Identify cognitive and physical deficits and behaviors that affect risk of falls.  - [place] fall precautions as indicated by assessment.  - Educate pt/family on patient safety including physical limitations  - Instruct pt to call for assistance with activity based on assessment  - Modify environment to reduce risk of injury  - Provide assistive devices as appropriate  - Consider OT/PT consult to assist with strengthening/mobility  - Encourage toileting schedule  Outcome: Progressing     Problem: RESPIRATORY - ADULT  Goal: Achieves optimal ventilation and oxygenation  Description: INTERVENTIONS:  - Assess for changes in respiratory status  - Assess for changes in mentation and behavior  - Position to facilitate oxygenation and minimize respiratory effort  - Oxygen supplementation based on oxygen saturation or ABGs  - Provide Smoking Cessation handout, if applicable  - Encourage broncho-pulmonary hygiene including cough, deep breathe, Incentive Spirometry  - Assess the need for suctioning and perform as needed  - Assess and instruct to report SOB or any respiratory difficulty  - Respiratory Therapy support as indicated  - Manage/alleviate anxiety  - Monitor for signs/symptoms of CO2 retention  Outcome: Progressing

## 2025-07-07 ENCOUNTER — APPOINTMENT (OUTPATIENT)
Dept: CT IMAGING | Facility: HOSPITAL | Age: 65
End: 2025-07-07
Attending: INTERNAL MEDICINE
Payer: COMMERCIAL

## 2025-07-07 ENCOUNTER — APPOINTMENT (OUTPATIENT)
Dept: ULTRASOUND IMAGING | Facility: HOSPITAL | Age: 65
End: 2025-07-07
Attending: HOSPITALIST
Payer: COMMERCIAL

## 2025-07-07 ENCOUNTER — APPOINTMENT (OUTPATIENT)
Dept: CT IMAGING | Facility: HOSPITAL | Age: 65
End: 2025-07-07
Attending: HOSPITALIST
Payer: COMMERCIAL

## 2025-07-07 LAB
AFP-TM SERPL-MCNC: 5.6 NG/ML (ref ?–8)
ALBUMIN SERPL-MCNC: 2.7 G/DL (ref 3.2–4.8)
ALBUMIN SERPL-MCNC: 2.8 G/DL (ref 3.2–4.8)
ALP LIVER SERPL-CCNC: 521 U/L (ref 45–117)
ALT SERPL-CCNC: 70 U/L (ref 10–49)
ANION GAP SERPL CALC-SCNC: 7 MMOL/L (ref 0–18)
AST SERPL-CCNC: 41 U/L (ref ?–34)
ATRIAL RATE: 89 BPM
BASOPHILS # BLD AUTO: 0.09 X10(3) UL (ref 0–0.2)
BASOPHILS NFR BLD AUTO: 0.9 %
BILIRUB DIRECT SERPL-MCNC: 0.3 MG/DL (ref ?–0.3)
BILIRUB SERPL-MCNC: 0.5 MG/DL (ref 0.2–1.1)
BUN BLD-MCNC: 28 MG/DL (ref 9–23)
BUN/CREAT SERPL: 24.6 (ref 10–20)
CALCIUM BLD-MCNC: 10.1 MG/DL (ref 8.7–10.4)
CALCIUM BLD-MCNC: 10.6 MG/DL (ref 8.7–10.4)
CALCIUM BLD-MCNC: 11.1 MG/DL (ref 8.7–10.4)
CEA SERPL-MCNC: 1 NG/ML (ref ?–5)
CHLORIDE SERPL-SCNC: 108 MMOL/L (ref 98–112)
CO2 SERPL-SCNC: 23 MMOL/L (ref 21–32)
CREAT BLD-MCNC: 1.14 MG/DL (ref 0.7–1.3)
DEPRECATED RDW RBC AUTO: 48.8 FL (ref 35.1–46.3)
EGFRCR SERPLBLD CKD-EPI 2021: 72 ML/MIN/1.73M2 (ref 60–?)
EOSINOPHIL # BLD AUTO: 0.08 X10(3) UL (ref 0–0.7)
EOSINOPHIL NFR BLD AUTO: 0.8 %
ERYTHROCYTE [DISTWIDTH] IN BLOOD BY AUTOMATED COUNT: 15.2 % (ref 11–15)
GLUCOSE BLD-MCNC: 154 MG/DL (ref 70–99)
HAV IGM SER QL: NONREACTIVE
HCT VFR BLD AUTO: 36.6 % (ref 39–53)
HGB BLD-MCNC: 12 G/DL (ref 13–17.5)
IMM GRANULOCYTES # BLD AUTO: 0.16 X10(3) UL (ref 0–1)
IMM GRANULOCYTES NFR BLD: 1.5 %
LYMPHOCYTES # BLD AUTO: 2.62 X10(3) UL (ref 1–4)
LYMPHOCYTES NFR BLD AUTO: 24.8 %
MAGNESIUM SERPL-MCNC: 1.6 MG/DL (ref 1.6–2.6)
MCH RBC QN AUTO: 28.8 PG (ref 26–34)
MCHC RBC AUTO-ENTMCNC: 32.8 G/DL (ref 31–37)
MCV RBC AUTO: 87.8 FL (ref 80–100)
MONOCYTES # BLD AUTO: 0.29 X10(3) UL (ref 0.1–1)
MONOCYTES NFR BLD AUTO: 2.7 %
NEUTROPHILS # BLD AUTO: 7.32 X10 (3) UL (ref 1.5–7.7)
NEUTROPHILS # BLD AUTO: 7.32 X10(3) UL (ref 1.5–7.7)
NEUTROPHILS NFR BLD AUTO: 69.3 %
OSMOLALITY SERPL CALC.SUM OF ELEC: 295 MOSM/KG (ref 275–295)
P AXIS: 51 DEGREES
P-R INTERVAL: 166 MS
PLATELET # BLD AUTO: 215 10(3)UL (ref 150–450)
POTASSIUM SERPL-SCNC: 4.1 MMOL/L (ref 3.5–5.1)
PROT SERPL-MCNC: 7.9 G/DL (ref 5.7–8.2)
Q-T INTERVAL: 320 MS
QRS DURATION: 86 MS
QTC CALCULATION (BEZET): 389 MS
R AXIS: 26 DEGREES
RBC # BLD AUTO: 4.17 X10(6)UL (ref 4.3–5.7)
SODIUM SERPL-SCNC: 138 MMOL/L (ref 136–145)
T AXIS: 5 DEGREES
VENTRICULAR RATE: 89 BPM
VIT D+METAB SERPL-MCNC: 199.7 NG/ML (ref 30–100)
WBC # BLD AUTO: 10.6 X10(3) UL (ref 4–11)

## 2025-07-07 PROCEDURE — 99233 SBSQ HOSP IP/OBS HIGH 50: CPT | Performed by: HOSPITALIST

## 2025-07-07 PROCEDURE — 74176 CT ABD & PELVIS W/O CONTRAST: CPT | Performed by: RADIOLOGY

## 2025-07-07 PROCEDURE — 76700 US EXAM ABDOM COMPLETE: CPT | Performed by: RADIOLOGY

## 2025-07-07 PROCEDURE — 99222 1ST HOSP IP/OBS MODERATE 55: CPT | Performed by: INTERNAL MEDICINE

## 2025-07-07 PROCEDURE — 71250 CT THORAX DX C-: CPT | Performed by: RADIOLOGY

## 2025-07-07 PROCEDURE — 99223 1ST HOSP IP/OBS HIGH 75: CPT | Performed by: INTERNAL MEDICINE

## 2025-07-07 RX ORDER — MAGNESIUM OXIDE 400 MG/1
400 TABLET ORAL ONCE
Status: COMPLETED | OUTPATIENT
Start: 2025-07-07 | End: 2025-07-07

## 2025-07-07 NOTE — DIETARY NOTE
ADULT NUTRITION INITIAL ASSESSMENT    Pt is at moderate nutrition risk.  Pt does not meet malnutrition criteria.      RECOMMENDATIONS TO MD: See nutrition intervention for ONS (oral nutrition supplements)    ADMITTING DIAGNOSIS:  Hypercalcemia [E83.52]  CAROL (acute kidney injury) [N17.9]  Acute respiratory failure with hypoxia (HCC) [J96.01]  Multifocal pneumonia [J18.9]  PERTINENT PAST MEDICAL HISTORY: Past Medical History[1]    PATIENT STATUS: Initial 07/07/25: Pt assessed due to screening at risk, MST score of 2. Pt admitted with cough, fevers, and SOB since Friday. Pt was found to have multifocal pneumonia. PMH of HTN and HLD. Met with pt and family bedside. Wife reports pt has no appetite, she reports this began ~1 week ago. Pt reports he has not been eating much, wife helping and encouraging pt order meals. Pt reports some weight loss, reports a UBW of 190 lbs. Pt last weighed 190 lbs 6/8, current weight of 186 lbs. 2.1% weight loss in 1 month, non-significant. Discussed ONS to optimize nutrition as his PO has been poor. Pt and wife agreeable to ensure HP BID. Will monitor intake.     FOOD/NUTRITION RELATED HISTORY:  Appetite: Poor  Intake: No meals documented  Intake Meeting Needs: No, but oral nutrition supplements (ONS) to maximize  Percent Meals Eaten (last 6 days)       Date/Time Percent Meals Eaten (%)    07/07/25 0558 0 %    07/07/25 0806 0 %     Percent Meals Eaten (%): pt npo at 07/07/25 0806           Food Allergies: No Known Food Allergies (NKFA), confirmed with pt and family   Cultural/Ethnic/Temple Preferences: Not Obtained    GASTROINTESTINAL: +BM 7/7, and diarrhea    MEDICATIONS: reviewed  Scheduled Medications[2]  Medication Infusions[3]     LABS: reviewed  Recent Labs     07/06/25  1245 07/06/25  1358 07/07/25  0652   *  --  154*   BUN 39*  --  28*   CREATSERUM 1.76* 1.71* 1.14   CA 13.5* 13.0* 10.6*   MG 2.0  --  1.6   *  --  138   K 4.2  --  4.1   CL 99  --  108   CO2 27.0  --   23.0   PHOS  --  4.2  --    OSMOCALC 284  --  295       WEIGHT HISTORY:  Patient Weight(s) for the past 336 hrs:   Weight   07/06/25 1625 84.4 kg (186 lb)   07/06/25 1149 86.2 kg (190 lb 0.6 oz)     Wt Readings from Last 10 Encounters:   07/06/25 84.4 kg (186 lb)   06/30/25 86.2 kg (190 lb)   06/25/25 85.7 kg (189 lb)   06/08/25 86.2 kg (190 lb)   05/05/25 85.1 kg (187 lb 9.6 oz)   04/02/25 86.2 kg (190 lb)   03/30/25 86.2 kg (190 lb)   10/28/23 86.2 kg (190 lb)   10/05/23 86.6 kg (191 lb)   08/17/23 85.7 kg (189 lb)       ANTHROPOMETRICS:  HT:  5'5  Wt Readings from Last 1 Encounters:   07/06/25 84.4 kg (186 lb)     Last weight: Likely accurate  BMI: Body mass index is 30.95 kg/m².  Dosing Weight: 84.4 kg - recent weight, utilized for anthropometric calculations  BMI CLASSIFICATION: 30-34.9 kg/m2 - obesity class I  No data recorded   IBW: 62 kg  137% IBW  Usual Body Wt: 190 lbs       102% UBW    NUTRITION RELATED PHYSICAL FINDINGS:  - Nutrition Focused Physical Exam (NFPE): no wasting noted  - Fluid Accumulation: none . See RN documentation for details  - Skin Integrity: RN documentation reviewed. See RN documentation for details    NUTRITION DIAGNOSIS/PROBLEM:   Inadequate oral intake related to Decreased ability to consume sufficient energy d/t poor appetite as evidenced by poor appetite/PO x 1 week per pt and wife    NUTRITION INTERVENTION:     NUTRITION PRESCRIPTION:   Estimated Nutrition needs: --dosing wt of 84.4 kg - wt taken on 7/6/25  Calories: 5217-0264 calories/day (22-25 calories per kg Dosing wt)  Protein:  g protein/day (1.0-1.2 g protein/kg Dosing wt)    - Diet:       Procedures    Cardiac diet Cardiac; Is Patient on Accuchecks? No      - Nutrition Care Plan: Initiated ONS (oral nutritional supplements)  - ONS (Oral Nutrition Supplements)/Meals/Snacks: Ensure HP BID   - Vitamin and mineral supplements: none  - Feeding assistance: Family assisting pt and encouraging PO intake  - Nutrition  education: Discussed importance of adequate energy and protein intake    - Coordination of nutrition care: collaboration with other providers  - Discharge and transfer of nutrition care to new setting or provider: monitor plans    MONITOR AND EVALUATE/NUTRITION GOALS:  - Food and Nutrient Intake:      Monitor: adequacy of PO intake and adequacy of supplement intake  - Food and Nutrient Administration:      Monitor: N/A  - Anthropometric Measurement:    Monitor weight  - Nutrition Goals:      PO and supplement greater than 75% of needs, intake to meet needs, good supplement intake, promote healing, and support body systems    DIETITIAN FOLLOW UP: RD to follow and monitor nutrition status    Berkley Lynch, MS, RDN, LDN  Clinical Dietitian  [phone]          [1]   Past Medical History:   Essential hypertension    Hyperlipidemia   [2]    heparin  5,000 Units Subcutaneous 2 times per day    azithromycin  500 mg Oral Daily    ampicillin-sulbactam  3 g Intravenous q6h    ipratropium-albuterol  3 mL Nebulization Q6H WA    methylPREDNISolone  60 mg Intravenous Q8H   [3]    sodium chloride 100 mL/hr at 07/07/25 1003

## 2025-07-07 NOTE — CM/SW NOTE
MDO for [facility] eval    Per chart review pt lives at home w/ spouse. He is A&O and up SBA.    Currently on 02 4L NC    Pt does not currently meet [facility] criteria, CM/SW will continue to monitor for potential home 02 needs however, no medical dx to qualify for home 02 is documented at this time.    Plan  pending    / to remain available for support and/or discharge planning.     Denise Murphy RN    Ext [phone]

## 2025-07-07 NOTE — PLAN OF CARE
Patient is AxO4, can be forgetful. VSS. Receiving 4L of O2 via nasal cannula, attempted to wean, pt did not tolerate. Wheezing improving per pt report, receiving neb treatments, abx, and steroids. Denies pain. Denies n/v. Sputum culture pending. Pt c/o diarrhea - Cdiff sample collected. Robitussin and cepacol prn for sore throat and cough. Tele in place - MD notified for a brief episode of bradycardia HR in 40s.  IVF infusing. Voiding freely.  Plan to receive US of abdomen today.  NPO since 0000. Wife at bedside, updated on plan of care. Appropriate safety measures maintained.        Problem: Patient Centered Care  Goal: Patient preferences are identified and integrated in the patient's plan of care  Description: Interventions:  - What would you like us to know as we care for you?   - Provide timely, complete, and accurate information to patient/family  - Incorporate patient and family knowledge, values, beliefs, and cultural backgrounds into the planning and delivery of care  - Encourage patient/family to participate in care and decision-making at the level they choose  - Honor patient and family perspectives and choices  7/6/2025 2238 by Goldberg, Rachel, RN  Outcome: Progressing  7/6/2025 2223 by Goldberg, Rachel, RN  Outcome: Progressing     Problem: Patient/Family Goals  Goal: Patient/Family Long Term Goal  Description: Patient's Long Term Goal:     Interventions:  -   - See additional Care Plan goals for specific interventions  7/6/2025 2238 by Goldberg, Rachel, RN  Outcome: Progressing  7/6/2025 2223 by Goldberg, Rachel, RN  Outcome: Progressing  Goal: Patient/Family Short Term Goal  Description: Patient's Short Term Goal:     Interventions:   - See additional Care Plan goals for specific interventions  7/6/2025 2238 by Goldberg, Rachel, RN  Outcome: Progressing  7/6/2025 2223 by Goldberg, Rachel, RN  Outcome: Progressing     Problem: RISK FOR INFECTION - ADULT  Goal: Absence of fever/infection during  anticipated neutropenic period  Description: INTERVENTIONS  - Monitor WBC  - Administer growth factors as ordered  - Implement neutropenic guidelines  7/6/2025 2238 by Goldberg, Rachel, RN  Outcome: Progressing  7/6/2025 2223 by Goldberg, Rachel, RN  Outcome: Progressing     Problem: SAFETY ADULT - FALL  Goal: Free from fall injury  Description: INTERVENTIONS:  - Assess pt frequently for physical needs  - Identify cognitive and physical deficits and behaviors that affect risk of falls.  - [place] fall precautions as indicated by assessment.  - Educate pt/family on patient safety including physical limitations  - Instruct pt to call for assistance with activity based on assessment  - Modify environment to reduce risk of injury  - Provide assistive devices as appropriate  - Consider OT/PT consult to assist with strengthening/mobility  - Encourage toileting schedule  7/6/2025 2238 by Goldberg, Rachel, RN  Outcome: Progressing  7/6/2025 2223 by Goldberg, Rachel, RN  Outcome: Progressing     Problem: RESPIRATORY - ADULT  Goal: Achieves optimal ventilation and oxygenation  Description: INTERVENTIONS:  - Assess for changes in respiratory status  - Assess for changes in mentation and behavior  - Position to facilitate oxygenation and minimize respiratory effort  - Oxygen supplementation based on oxygen saturation or ABGs  - Provide Smoking Cessation handout, if applicable  - Encourage broncho-pulmonary hygiene including cough, deep breathe, Incentive Spirometry  - Assess the need for suctioning and perform as needed  - Assess and instruct to report SOB or any respiratory difficulty  - Respiratory Therapy support as indicated  - Manage/alleviate anxiety  - Monitor for signs/symptoms of CO2 retention  7/6/2025 2238 by Goldberg, Rachel, RN  Outcome: Progressing  7/6/2025 2223 by Goldberg, Rachel, RN  Outcome: Progressing

## 2025-07-07 NOTE — TELEPHONE ENCOUNTER
Patient spouse (Anneliese) called and provided details. Informed spouse (Anneliese) I will task provider to ask if request is supported. Spouse verbalized understanding. Processing agent informed    Type of Leave: intermittent  Reason for Leave:   Allergic reaction, Allegeries  Start date of leave: 7/1/25  End date of leave: 1/1/26  How many flare ups per month/length?: 1-2 flare ups a month, lasting 3-4 days  How many appts per month/length?: 1 appointment every 2 month, lasting 1-4 hours   Was Fee and Turnaround info Given?: Yes

## 2025-07-07 NOTE — PROGRESS NOTES
[facility]  part of [facility]    Progress Note    Karel Ford Patient Status:  Inpatient    10/6/1960 MRN [id number]   Location [facility] 4W/SW/SE Attending Tawana Mireles MD   Hosp Day # 1 PCP Martin Adams MD     Chief Complaint: pneumonia    SUBJECTIVE:    No acute events overnight.  Patient denies chest pain, sob.  No fevers/chills.  No n/v/abd pain.  He is coughing more today and bringing up more sputum.     10 ROS completed and otherwise negative.    OBJECTIVE:  Vital signs in last 24 hours:  /79 (BP Location: Right arm)   Pulse 92   Temp 97.7 °F (36.5 °C) (Axillary)   Resp 24   Wt 186 lb (84.4 kg)   SpO2 91%   BMI 30.95 kg/m²     Intake/Output:    Intake/Output Summary (Last 24 hours) at 2025 1347  Last data filed at 2025 0806  Gross per 24 hour   Intake 3890 ml   Output 445 ml   Net 3445 ml       Wt Readings from Last 3 Encounters:   25 186 lb (84.4 kg)   25 190 lb (86.2 kg)   25 189 lb (85.7 kg)       Exam     Gen: uncomfortable  Pulm: coarse bs, moving air  CV: Heart with regular rate and rhythm  Abd: Abdomen soft, nontender, bowel sounds present  Neuro: No acute focal deficits  MSK: moves extremities  Skin: Warm and dry  Psych: Normal affect  Ext: no c/c/e    Data Review:     Labs:   Lab Results   Component Value Date    WBC 10.6 2025    HGB 12.0 2025    HCT 36.6 2025    .0 2025    CREATSERUM 1.14 2025    BUN 28 2025     2025    K 4.1 2025     2025    CO2 23.0 2025     2025    CA 10.6 2025    ALB 2.7 2025    ALKPHO 521 2025    BILT 0.5 2025    TP 7.9 2025    AST 41 2025    ALT 70 2025    MG 1.6 2025    PHOS 4.2 2025       Imaging: reviewed    US ABDOMEN COMPLETE (CPT=76700)  Result Date: 2025  CONCLUSION: Borderline hepatomegaly with steatosis. Gallbladder adenomyomatosis. Small  gallstone. No gallbladder wall thickening. Nonspecific pericholecystic fluid. Negative sonographic Lopez sign. Splenomegaly. Electronically Verified and Signed by Attending Radiologist: Danilo Neumann MD 7/7/2025 9:45 AM Workstation: [id number]    XR CHEST AP PORTABLE  (CPT=71045)  Result Date: 7/6/2025  CONCLUSION: Multifocal bilateral patchy opacities may represent pneumonia or edema. Electronically Verified and Signed by Attending Radiologist: Atif Daigle MD 7/6/2025 1:58 PM Workstation: [id number]      Meds:   Current Hospital Medications[1]      Assessment  Problem List[2]    Plan:     Multifocal pneumonia, possible gram neg pneumonia  - started broad spectrum IV abx  - await urine legionella - neg  - sputum and blood cxs pending  - pulm consulted  - ct chest pending     Possible acute copd exacerbation  - pt has a smoking history, not currently tobacco user  - started nebs and steroids  - abx as above  - pulm on consult     Hypercalcemia  - started IVFs  - recheck in am - improving but still elevated, endo consulted  - pth low, concern for malignancy induced hypercalcemia - will check ct a/p and tumor markers     Acute kidney injury   - possibly prerenal?  - started IVFs  - recheck in am - improved     Acute transaminitis  - check abd us - reviewed - showing hepatic steatosis and hepatomegaly; also shows GB adenomyomatosis - will need close outpt follow up   - trend lfts  - pt used to be an alcoholic but previous lfts were wnl - trending down now     DM  - accuchecks,  ISS     HTN  - bp stable without meds, monitor for now     HL  - hold statin given elevated lfts     Global A/P  - reviewed labs and vitals from today  - reviewed notes of the day  - cbc, bmp, mag ordered for tomorrow  - discussed need to stay in the hospital today due to above  - cont IV abx  - discussed with patient/RN and care team  - dispo pending clinical course      Tawana Mireles MD  7/7/2025  1:47 PM    Supplementary  Documentation:   DVT Mechanical Prophylaxis:   SCDs,    DVT Pharmacologic Prophylaxis   Medication    heparin (Porcine) 5000 UNIT/ML injection 5,000 Units                Code Status: Not on file  [place]: No urinary catheter in place  [place] Duration (in days):   Central line:    MYLES: 7/10/2025                           MDM: High complexity-acute illness posing a threat to life. IV meds requiring close inpatient monitoring. I personally spent time on chart/note review, review of labs/imaging, discussion with patient, physical exam, discussion with staff, writing progress note, and discussion of plan of care.         [1]   Current Facility-Administered Medications   Medication Dose Route Frequency    sodium chloride 0.9% infusion   Intravenous Continuous    heparin (Porcine) 5000 UNIT/ML injection 5,000 Units  5,000 Units Subcutaneous 2 times per day    azithromycin (Zithromax) tab 500 mg  500 mg Oral Daily    ampicillin-sulbactam (Unasyn) 3 g in sodium chloride 0.9% 100mL IVPB-ELKE  3 g Intravenous q6h    benzonatate (Tessalon) cap 200 mg  200 mg Oral TID PRN    guaiFENesin (Robitussin) 100 MG/5 ML oral liquid 200 mg  200 mg Oral Q4H PRN    sodium chloride (Saline Mist) 0.65 % nasal solution 1 spray  1 spray Each Nare Q3H PRN    ipratropium-albuterol (Duoneb) 0.5-2.5 (3) MG/3ML inhalation solution 3 mL  3 mL Nebulization Q6H PRN    ipratropium-albuterol (Duoneb) 0.5-2.5 (3) MG/3ML inhalation solution 3 mL  3 mL Nebulization Q6H WA    methylPREDNISolone sodium succinate (Solu-MEDROL) injection 60 mg  60 mg Intravenous Q8H    benzocaine-menthol (Cepacol) lozenge 1 lozenge  1 lozenge Oral PRN   [2]   Patient Active Problem List  Diagnosis    Essential hypertension    Hyperlipidemia    Multifocal pneumonia    Acute respiratory failure with hypoxia (HCC)    CAROL (acute kidney injury)    Hypercalcemia

## 2025-07-07 NOTE — CONSULTS
[facility]  part of [facility]    Report of Consultation    Karel Ford Patient Status:  Inpatient    10/6/1960 MRN [id number]   Location [facility] 4W/SW/SE Attending Tawana Mireles MD   Hosp Day # 1 PCP Martin Adams MD     Date of Admission:  2025  Date of Consult:  2025    Reason for Consultation:  Hypercalcemia    History of Present Illness:  Karel Ford is a a(n) 64 year old male with PMH as below who is admitted for management of pneumonia.   He was noted to be hypercalcemic  Calcium  13.5 , albumin  2.7   PTH low  Vitamin D 25 in May 2025 --. Was recommended vitamin D 2000 unit daily by PCP . Wife bought 50,000 unit daily by mistake whch I what he has been taking    Denies any thyroid problems  Denies excess calcium intake via supplements or diet     History:  Past Medical History[1]  Past Surgical History[2]  Family History[3]   reports that he quit smoking about 17 years ago. His smoking use included cigarettes. He has been exposed to tobacco smoke. He has quit using smokeless tobacco. He reports current alcohol use of about 3.0 standard drinks of alcohol per week. He reports that he does not use drugs.    Allergies:  Allergies[4]    Medications:  Current Hospital Medications[5]    A comprehensive 10 point review of systems was completed.  Pertinent positives and negatives noted in the the HPI.    Physical Exam:  Blood pressure 129/79, pulse 92, temperature 97.7 °F (36.5 °C), temperature source Axillary, resp. rate 24, weight 186 lb (84.4 kg), SpO2 91%.        General Appearance:  alert, well developed, in no acute distress  Head: Atraumatic  Eyes:  normal conjunctivae, sclera., normal sclera and normal pupils  Throat/Neck: normal sound to voice. Normal hearing, normal speech  Respiratory:  Speaking in full sentences, non-labored. no increased work of breathing, no audible wheezing    Neuro: motor grossly intact, moving all extremities without  difficulty  Psychiatric:  oriented to time, self, and place      Impression and Plan:  Problem List[6]    Hypercalcemia  Calcium 10.6 now after IVF  Although albumin is low  Vot D I 199  High calcium is likely due t high vitamin D  Will recheck calcium : if not trending  down will start calcitonin   Etiology: PTH is low , hence not PTH related  QUINTERO ordered: SPEP, vitamin D 25 OH, vitamin D 1,25 OH, PThrp  7-8 am cortiol  TSh was normal recently    Thank you for the consult  We will follow    Farhana Acevedo MD         [1]   Past Medical History:   Essential hypertension    Hyperlipidemia   [2] History reviewed. No pertinent surgical history.  [3]   Family History  Problem Relation Age of Onset    Lung Disorder Mother     Heart Disease Father    [4]   Allergies  Allergen Reactions    Citrated Pterylmonoglutamic Acid [Glutamic Acid] UNKNOWN    Dander UNKNOWN    [place] UNKNOWN    Ragweed UNKNOWN    [place] UNKNOWN    Lisinopril RASH   [5]   Current Facility-Administered Medications:     sodium chloride 0.9% infusion, , Intravenous, Continuous    heparin (Porcine) 5000 UNIT/ML injection 5,000 Units, 5,000 Units, Subcutaneous, 2 times per day    azithromycin (Zithromax) tab 500 mg, 500 mg, Oral, Daily    ampicillin-sulbactam (Unasyn) 3 g in sodium chloride 0.9% 100mL IVPB-ELKE, 3 g, Intravenous, q6h    benzonatate (Tessalon) cap 200 mg, 200 mg, Oral, TID PRN    guaiFENesin (Robitussin) 100 MG/5 ML oral liquid 200 mg, 200 mg, Oral, Q4H PRN    sodium chloride (Saline Mist) 0.65 % nasal solution 1 spray, 1 spray, Each Nare, Q3H PRN    ipratropium-albuterol (Duoneb) 0.5-2.5 (3) MG/3ML inhalation solution 3 mL, 3 mL, Nebulization, Q6H PRN    ipratropium-albuterol (Duoneb) 0.5-2.5 (3) MG/3ML inhalation solution 3 mL, 3 mL, Nebulization, Q6H WA    methylPREDNISolone sodium succinate (Solu-MEDROL) injection 60 mg, 60 mg, Intravenous, Q8H    benzocaine-menthol (Cepacol) lozenge 1 lozenge, 1 lozenge, Oral,  PRN  [6]   Patient Active Problem List  Diagnosis    Essential hypertension    Hyperlipidemia    Multifocal pneumonia    Acute respiratory failure with hypoxia (HCC)    CAROL (acute kidney injury)    Hypercalcemia

## 2025-07-07 NOTE — CONSULTS
Initial Pulmonary Medicine Inpatient Consultation           Consult requested by Dr. Mireles for PNA.        HPI: 65 yo male with hx of recent allergic reaction requiring steroids, HTN now admitted with coughing, shortness of breath, and fevers x 3 days.   No sick contacts.   Denies previous hx of pulmonary problems. No hx of asthma, COPD, recurrent PNAs  CXR concerning for PNA  Started on abx-unasyn/azithro along with nebs and solumedrol  Pt is afebrile on 4 LNC supp 02      REVIEW OF SYSTEMS:  Positives and negatives as stated in HPI. Remainder of 12 pt review of systems otherwise are negative.     PAST MEDICAL HISTORY:  Past Medical History[1]     PAST SURGICAL HISTORY:  Past Surgical History[2]     PAST FAMILY HISTORY:  Family History[3]     PAST SOCIAL HISTORY:  Social Hx on file[4]  Quit smoking 18 yrs. 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 No OP exudates  NECK/THROAT: no JVD. Trachea midline. No obvious thyromegaly  LUNG: diminished BS with min wheezing, crackles. Chest symmetric with respiratory motion  HEART: regular rate and rhythm, no obvious murmers or gallops note  ABD: soft non tender. + bowel sounds. No organomegaly noted  EXT: no clubbing, cyanosis, or edema noted. Pulses intact grossly  NEURO/MUSCULOSKELETAL: no gross deficits  SKIN: warm, dry. No obvious lesions noted  LYMPH: no obvious LAD       IMAGES:  CXR 7/6/25  CONCLUSION:  Multifocal bilateral patchy opacities may represent pneumonia or edema.       LABS:  Recent Labs   Lab 25  1245 25  0652   RBC 4.54 4.17*   HGB 13.0 12.0*   HCT 40.0 36.6*   MCV 88.1 87.8   MCH 28.6 28.8   MCHC 32.5 32.8   RDW 15.0 15.2*   NEPRELIM 6.47 7.32   WBC 10.1 10.6   .0 215.0       Recent Labs   Lab 25  1245 25  1358 25  0652   *  --  154*   BUN 39*  --  28*   CREATSERUM 1.76* 1.71* 1.14   EGFRCR 43*  --  72   CA 13.5* 13.0* 10.6*   ALB 3.2  --   --    *  --  138   K 4.2  --  4.1   CL 99  --  108   CO2 27.0  --  23.0   ALKPHO 636*  --   --    AST 78*  --   --    ALT 85*  --   --    BILT 0.6  --   --    TP 8.9*  --   --        ASSESSMENT/PLAN:  Multifocal PNA  -CXR with b/l infiltrates  -started on empiric unasyn/azithro  -sputum cx ordered  -urine leg neg   -cont supp 02-currently on 4 LNC  -nebs  -can continue short course solumedrol  -check chest CT scan given CXR findings and occupational exposures (painting, working with wood)    Recent allergic reaction  -s/p tx with steroids last month  -saw allergy and ENT    Proph  -DVT: hep subcutaneous    Dispo  -full code  -updated wife at bedside     Thank you for the opportunity to care for Karel Ford.       LIBRADO Johnson DO, MPH  Pulmonary Critical Care Medicine  [facility]          [1]   Past Medical History:  Diagnosis Date    Essential hypertension 2023    Hyperlipidemia    [2] History reviewed. No pertinent surgical history.  [3]   Family History  Problem Relation Age of Onset    Lung Disorder Mother     Heart Disease Father    [4]   Social History  Socioeconomic History    Marital status:    Tobacco Use    Smoking status: Former     Current packs/day: 0.00     Types: Cigarettes     Quit date: 2008     Years since quittin.5     Passive exposure: Past    Smokeless tobacco: Former   Vaping Use    Vaping status: Never Used   Substance and  Sexual Activity    Alcohol use: Yes     Alcohol/week: 3.0 standard drinks of alcohol     Types: 3 Cans of beer per week     Comment: maybe 3 beers a day    Drug use: Never   [5]   Allergies  Allergen Reactions    Citrated Pterylmonoglutamic Acid [Glutamic Acid] UNKNOWN    Dander UNKNOWN    [place] UNKNOWN    Ragweed UNKNOWN    [place] UNKNOWN    Lisinopril RASH   [6]   Outpatient Medications Marked as Taking for the 25 encounter (Hospital Encounter)   Medication Sig Dispense Refill    diphenhydrAMINE (BANOPHEN) 25 MG Oral Cap Take 1 capsule (25 mg total) by mouth every 6 (six) hours as needed for Itching or Allergies.      [] triamcinolone 0.5 % External Cream Apply 1 Application topically 2 (two) times daily for 10 days. Apply twice daily for 7-10 days. Do NOT use on face or in folds of skin. 15 g 3    amLODIPine 10 MG Oral Tab Take 1 tablet (10 mg total) by mouth daily. 90 tablet 3    rosuvastatin 10 MG Oral Tab Take 1 tablet (10 mg total) by mouth nightly. FOR CHOLESTEROL. 90 tablet 3    EPINEPHrine (EPIPEN 2-ROSENDA) 0.3 MG/0.3ML Injection Solution Auto-injector Inject IM in event of  allergic reaction 1 each 0    levocetirizine 5 MG Oral Tab Take 1 tablet (5 mg total) by mouth in the morning and 1 tablet (5 mg total) before bedtime. 180 tablet 1    albuterol (VENTOLIN HFA) 108 (90 Base) MCG/ACT Inhalation Aero Soln Inhale 2 puffs into the lungs every 4 (four) hours as needed for Wheezing. 1 each 0   [7]    magnesium oxide  400 mg Oral Once    heparin  5,000 Units Subcutaneous 2 times per day    azithromycin  500 mg Oral Daily    ampicillin-sulbactam  3 g Intravenous q6h    ipratropium-albuterol  3 mL Nebulization Q6H WA    methylPREDNISolone  60 mg Intravenous Q8H   [8]    sodium chloride 100 mL/hr at 25 0032   [9]   benzonatate    guaiFENesin    sodium chloride    ipratropium-albuterol    benzocaine-menthol

## 2025-07-07 NOTE — PLAN OF CARE
Down for CT Chest and US Abd today. Continuing lab workup per primary and endo teams. Wife at bedside. IVF and IV abx as ordered/scheduled. Continues on IV steroids per pulm. Remains on 4L 02 - unable to wean off today. Scheduled nebs continued.     Problem: Patient Centered Care  Goal: Patient preferences are identified and integrated in the patient's plan of care  Description: Interventions:  - What would you like us to know as we care for you?   - Provide timely, complete, and accurate information to patient/family  - Incorporate patient and family knowledge, values, beliefs, and cultural backgrounds into the planning and delivery of care  - Encourage patient/family to participate in care and decision-making at the level they choose  - Honor patient and family perspectives and choices  Outcome: Progressing     Problem: Patient/Family Goals  Goal: Patient/Family Long Term Goal  Description: Patient's Long Term Goal:     Interventions:  -   - See additional Care Plan goals for specific interventions  Outcome: Progressing  Goal: Patient/Family Short Term Goal  Description: Patient's Short Term Goal:    Interventions:   -   - See additional Care Plan goals for specific interventions  Outcome: Progressing     Problem: RISK FOR INFECTION - ADULT  Goal: Absence of fever/infection during anticipated neutropenic period  Description: INTERVENTIONS  - Monitor WBC  - Administer growth factors as ordered  - Implement neutropenic guidelines  Outcome: Progressing     Problem: SAFETY ADULT - FALL  Goal: Free from fall injury  Description: INTERVENTIONS:  - Assess pt frequently for physical needs  - Identify cognitive and physical deficits and behaviors that affect risk of falls.  - [place] fall precautions as indicated by assessment.  - Educate pt/family on patient safety including physical limitations  - Instruct pt to call for assistance with activity based on assessment  - Modify environment to reduce risk of injury  -  Provide assistive devices as appropriate  - Consider OT/PT consult to assist with strengthening/mobility  - Encourage toileting schedule  Outcome: Progressing     Problem: RESPIRATORY - ADULT  Goal: Achieves optimal ventilation and oxygenation  Description: INTERVENTIONS:  - Assess for changes in respiratory status  - Assess for changes in mentation and behavior  - Position to facilitate oxygenation and minimize respiratory effort  - Oxygen supplementation based on oxygen saturation or ABGs  - Provide Smoking Cessation handout, if applicable  - Encourage broncho-pulmonary hygiene including cough, deep breathe, Incentive Spirometry  - Assess the need for suctioning and perform as needed  - Assess and instruct to report SOB or any respiratory difficulty  - Respiratory Therapy support as indicated  - Manage/alleviate anxiety  - Monitor for signs/symptoms of CO2 retention  Outcome: Progressing

## 2025-07-07 NOTE — PLAN OF CARE
Problem: Patient Centered Care  Goal: Patient preferences are identified and integrated in the patient's plan of care  Description: Interventions:  - What would you like us to know as we care for you? ***  - Provide timely, complete, and accurate information to patient/family  - Incorporate patient and family knowledge, values, beliefs, and cultural backgrounds into the planning and delivery of care  - Encourage patient/family to participate in care and decision-making at the level they choose  - Honor patient and family perspectives and choices  Outcome: Progressing     Problem: Patient/Family Goals  Goal: Patient/Family Long Term Goal  Description: Patient's Long Term Goal: ***    Interventions:  - ***  - See additional Care Plan goals for specific interventions  Outcome: Progressing  Goal: Patient/Family Short Term Goal  Description: Patient's Short Term Goal: ***    Interventions:   - ***  - See additional Care Plan goals for specific interventions  Outcome: Progressing     Problem: RISK FOR INFECTION - ADULT  Goal: Absence of fever/infection during anticipated neutropenic period  Description: INTERVENTIONS  - Monitor WBC  - Administer growth factors as ordered  - Implement neutropenic guidelines  Outcome: Progressing     Problem: SAFETY ADULT - FALL  Goal: Free from fall injury  Description: INTERVENTIONS:  - Assess pt frequently for physical needs  - Identify cognitive and physical deficits and behaviors that affect risk of falls.  - [place] fall precautions as indicated by assessment.  - Educate pt/family on patient safety including physical limitations  - Instruct pt to call for assistance with activity based on assessment  - Modify environment to reduce risk of injury  - Provide assistive devices as appropriate  - Consider OT/PT consult to assist with strengthening/mobility  - Encourage toileting schedule  Outcome: Progressing     Problem: RESPIRATORY - ADULT  Goal: Achieves optimal ventilation and  oxygenation  Description: INTERVENTIONS:  - Assess for changes in respiratory status  - Assess for changes in mentation and behavior  - Position to facilitate oxygenation and minimize respiratory effort  - Oxygen supplementation based on oxygen saturation or ABGs  - Provide Smoking Cessation handout, if applicable  - Encourage broncho-pulmonary hygiene including cough, deep breathe, Incentive Spirometry  - Assess the need for suctioning and perform as needed  - Assess and instruct to report SOB or any respiratory difficulty  - Respiratory Therapy support as indicated  - Manage/alleviate anxiety  - Monitor for signs/symptoms of CO2 retention  Outcome: Progressing

## 2025-07-07 NOTE — TELEPHONE ENCOUNTER
Dr. Adams    Patient is requesting intermittent Family Medical Leave Act for [place]. Request 1-2 flares a month, lasting 3-4 days and 1 appointment every 2 months, lasting 1-4 hours    Do you support?    Thank you  Thomas Mejia Department

## 2025-07-08 PROBLEM — R59.0 MEDIASTINAL LYMPHADENOPATHY: Status: ACTIVE | Noted: 2025-07-08

## 2025-07-08 LAB
ALBUMIN SERPL-MCNC: 2.6 G/DL (ref 3.2–4.8)
ALP LIVER SERPL-CCNC: 410 U/L (ref 45–117)
ALT SERPL-CCNC: 44 U/L (ref 10–49)
ANION GAP SERPL CALC-SCNC: 8 MMOL/L (ref 0–18)
AST SERPL-CCNC: 17 U/L (ref ?–34)
BILIRUB DIRECT SERPL-MCNC: 0.2 MG/DL (ref ?–0.3)
BILIRUB SERPL-MCNC: 0.3 MG/DL (ref 0.2–1.1)
BUN BLD-MCNC: 25 MG/DL (ref 9–23)
BUN/CREAT SERPL: 29.1 (ref 10–20)
CALCIUM BLD-MCNC: 10.2 MG/DL (ref 8.7–10.4)
CHLORIDE SERPL-SCNC: 108 MMOL/L (ref 98–112)
CO2 SERPL-SCNC: 26 MMOL/L (ref 21–32)
CORTIS SERPL-MCNC: 9.9 UG/DL
CREAT BLD-MCNC: 0.86 MG/DL (ref 0.7–1.3)
DEPRECATED RDW RBC AUTO: 50.2 FL (ref 35.1–46.3)
EGFRCR SERPLBLD CKD-EPI 2021: 97 ML/MIN/1.73M2 (ref 60–?)
ERYTHROCYTE [DISTWIDTH] IN BLOOD BY AUTOMATED COUNT: 15.3 % (ref 11–15)
GLUCOSE BLD-MCNC: 140 MG/DL (ref 70–99)
HAV AB SER QL IA: REACTIVE
HBV CORE AB SERPL QL IA: NONREACTIVE
HBV SURFACE AB SER QL: NONREACTIVE
HBV SURFACE AB SERPL IA-ACNC: 3.52 MIU/ML
HBV SURFACE AG SERPL QL IA: NONREACTIVE
HCT VFR BLD AUTO: 31.1 % (ref 39–53)
HCV AB SERPL QL IA: NONREACTIVE
HGB BLD-MCNC: 10.2 G/DL (ref 13–17.5)
LDH SERPL L TO P-CCNC: 126 U/L (ref 120–246)
MAGNESIUM SERPL-MCNC: 1.5 MG/DL (ref 1.6–2.6)
MCH RBC QN AUTO: 29.1 PG (ref 26–34)
MCHC RBC AUTO-ENTMCNC: 32.8 G/DL (ref 31–37)
MCV RBC AUTO: 88.9 FL (ref 80–100)
OSMOLALITY SERPL CALC.SUM OF ELEC: 301 MOSM/KG (ref 275–295)
PLATELET # BLD AUTO: 227 10(3)UL (ref 150–450)
POTASSIUM SERPL-SCNC: 3.9 MMOL/L (ref 3.5–5.1)
PROT SERPL-MCNC: 7.4 G/DL (ref 5.7–8.2)
RBC # BLD AUTO: 3.5 X10(6)UL (ref 4.3–5.7)
SODIUM SERPL-SCNC: 142 MMOL/L (ref 136–145)
URATE SERPL-MCNC: 7.9 MG/DL (ref 3.7–9.2)
WBC # BLD AUTO: 11 X10(3) UL (ref 4–11)

## 2025-07-08 PROCEDURE — 99233 SBSQ HOSP IP/OBS HIGH 50: CPT | Performed by: HOSPITALIST

## 2025-07-08 PROCEDURE — 99223 1ST HOSP IP/OBS HIGH 75: CPT | Performed by: STUDENT IN AN ORGANIZED HEALTH CARE EDUCATION/TRAINING PROGRAM

## 2025-07-08 PROCEDURE — 99232 SBSQ HOSP IP/OBS MODERATE 35: CPT | Performed by: PHYSICIAN ASSISTANT

## 2025-07-08 PROCEDURE — 99232 SBSQ HOSP IP/OBS MODERATE 35: CPT | Performed by: INTERNAL MEDICINE

## 2025-07-08 RX ORDER — PREDNISONE 20 MG/1
60 TABLET ORAL
Status: DISCONTINUED | OUTPATIENT
Start: 2025-07-09 | End: 2025-07-12

## 2025-07-08 RX ORDER — MAGNESIUM OXIDE 400 MG/1
800 TABLET ORAL ONCE
Status: COMPLETED | OUTPATIENT
Start: 2025-07-08 | End: 2025-07-08

## 2025-07-08 NOTE — PLAN OF CARE
Patient is AxO4, VSS. On 2L of O2 via nasal cannula, weaning as tolerated. Denies pain. Nebs abx, and steroids continued. Tolerating diet. Denies n/v. Tele in place. IVF infusing. Voiding freely.  Wife at bedside, updated on plan of care. Appropriate safety measures maintained.        Problem: Patient Centered Care  Goal: Patient preferences are identified and integrated in the patient's plan of care  Description: Interventions:  - What would you like us to know as we care for you?   - Provide timely, complete, and accurate information to patient/family  - Incorporate patient and family knowledge, values, beliefs, and cultural backgrounds into the planning and delivery of care  - Encourage patient/family to participate in care and decision-making at the level they choose  - Honor patient and family perspectives and choices  Outcome: Progressing     Problem: Patient/Family Goals  Goal: Patient/Family Long Term Goal  Description: Patient's Long Term Goal:     Interventions:  -   - See additional Care Plan goals for specific interventions  Outcome: Progressing  Goal: Patient/Family Short Term Goal  Description: Patient's Short Term Goal:     Interventions:   -  - See additional Care Plan goals for specific interventions  Outcome: Progressing     Problem: RISK FOR INFECTION - ADULT  Goal: Absence of fever/infection during anticipated neutropenic period  Description: INTERVENTIONS  - Monitor WBC  - Administer growth factors as ordered  - Implement neutropenic guidelines  Outcome: Progressing     Problem: SAFETY ADULT - FALL  Goal: Free from fall injury  Description: INTERVENTIONS:  - Assess pt frequently for physical needs  - Identify cognitive and physical deficits and behaviors that affect risk of falls.  - [place] fall precautions as indicated by assessment.  - Educate pt/family on patient safety including physical limitations  - Instruct pt to call for assistance with activity based on assessment  - Modify  environment to reduce risk of injury  - Provide assistive devices as appropriate  - Consider OT/PT consult to assist with strengthening/mobility  - Encourage toileting schedule  Outcome: Progressing     Problem: RESPIRATORY - ADULT  Goal: Achieves optimal ventilation and oxygenation  Description: INTERVENTIONS:  - Assess for changes in respiratory status  - Assess for changes in mentation and behavior  - Position to facilitate oxygenation and minimize respiratory effort  - Oxygen supplementation based on oxygen saturation or ABGs  - Provide Smoking Cessation handout, if applicable  - Encourage broncho-pulmonary hygiene including cough, deep breathe, Incentive Spirometry  - Assess the need for suctioning and perform as needed  - Assess and instruct to report SOB or any respiratory difficulty  - Respiratory Therapy support as indicated  - Manage/alleviate anxiety  - Monitor for signs/symptoms of CO2 retention  Outcome: Progressing

## 2025-07-08 NOTE — CONSULTS
[facility] Hematology Oncology  Initial Inpatient Consult Note    Karel Ford Patient Status:  Inpatient    10/6/1960 MRN [id number]   Location [facility] 4W/SW/SE Attending Tawana Mireels MD   Hosp Day # 2 PCP Martin Adams MD     Reason for consultation: lymphadenopathy     Subjective:   Karel Ford is a 64 year old male with a history of hypertension and hyperlipidemia who was admitted  with cough, low-grade fevers and shortness of breath. Imaging showed concerns for multifocal pneumonia, he was found to be hypoxic on exam requiring oxygen supplementation and imaging showed prominent mediastinal adenopathy and splenomegaly for which hematology has been consulted.    Review of Systems:  Hematology/Oncology ROS performed and negative except as above in HPI    History/Other:   Past Medical History:  Past Medical History[1]    Past Surgical History:  Past Surgical History[2]    Current Medications:  Current Medications[3]    Allergies:   Allergies[4]    Family Medical History:  Family History[5]    Social History:  Social History     Socioeconomic History    Marital status:      Spouse name: Not on file    Number of children: Not on file    Years of education: Not on file    Highest education level: Not on file   Occupational History    Not on file   Tobacco Use    Smoking status: Former     Current packs/day: 0.00     Types: Cigarettes     Quit date: 2008     Years since quittin.5     Passive exposure: Past    Smokeless tobacco: Former   Vaping Use    Vaping status: Never Used   Substance and Sexual Activity    Alcohol use: Yes     Alcohol/week: 3.0 standard drinks of alcohol     Types: 3 Cans of beer per week     Comment: maybe 3 beers a day    Drug use: Never    Sexual activity: Not on file   Other Topics Concern    Not on file   Social History Narrative    Not on file     Social Drivers of Health     Food Insecurity: No Food Insecurity (2025)    NCSS - Food Insecurity      Worried About Running Out of Food in the Last Year: No     Ran Out of Food in the Last Year: No   Transportation Needs: No Transportation Needs (7/6/2025)    NCSS - Transportation     Lack of Transportation: No   Housing Stability: Not At Risk (7/6/2025)    NCSS - Housing/Utilities     Has Housing: Yes     Worried About Losing Housing: No     Unable to Get Utilities: No     Objective:    /64 (BP Location: Right arm)   Pulse 82   Temp 97.5 °F (36.4 °C) (Oral)   Resp 20   Wt 84.4 kg (186 lb)   SpO2 93%   BMI 30.95 kg/m²   Physical Exam:  General: A&Ox3, NAD  HEENT: PERRL, OP clear  Neck: supple, no LAD or JVD  CV: RRR, no murmurs, + pulses  Pulm: coarse throughout   Abd: soft, ntnd, normal bowel sounds  Lymph: no palpable lymphadenopathy throughout the cervical, supraclavicular, axillary regions   Extremities: no edema or calf tenderness  Neurological: Grossly intact    Labs:  Lab Results   Component Value Date/Time    WBC 11.0 07/08/2025 06:51 AM    RBC 3.50 (L) 07/08/2025 06:51 AM    HGB 10.2 (L) 07/08/2025 06:51 AM    HCT 31.1 (L) 07/08/2025 06:51 AM    MCV 88.9 07/08/2025 06:51 AM    MCH 29.1 07/08/2025 06:51 AM    MCHC 32.8 07/08/2025 06:51 AM    RDW 15.3 (H) 07/08/2025 06:51 AM    NEPRELIM 7.32 07/07/2025 06:52 AM    .0 07/08/2025 06:51 AM       Lab Results   Component Value Date/Time     (H) 07/08/2025 06:51 AM    BUN 25 (H) 07/08/2025 06:51 AM    CREATSERUM 0.86 07/08/2025 06:51 AM    GFRNAA >60 04/17/2017 11:51 AM    CA 10.2 07/08/2025 06:51 AM    ALB 2.6 (L) 07/08/2025 06:51 AM     07/08/2025 06:51 AM    K 3.9 07/08/2025 06:51 AM     07/08/2025 06:51 AM    CO2 26.0 07/08/2025 06:51 AM    ALKPHO 410 (H) 07/08/2025 06:51 AM    AST 17 07/08/2025 06:51 AM    ALT 44 07/08/2025 06:51 AM       Imaging:  CT ABDOMEN+PELVIS(CPT=74176)  Result Date: 7/7/2025  CONCLUSION: 1.  Moderate splenomegaly. 2.  Mild nonspecific lymphadenopathy in the gastrohepatic ligament, and distal iliac  chain bilaterally. 3.  Please see chest CT for differential. Electronically Verified and Signed by Attending Radiologist: Atif Daigle MD 7/7/2025 11:51 PM Workstation: [id number]    CT CHEST (CPT=71250)  Result Date: 7/7/2025  CONCLUSION: 1.  Marked mediastinal lymphadenopathy. Coexistent splenomegaly and gastrohepatic ligament lymphadenopathy. Differential:lymphoma, metastases, and less likely sarcoid or benign etiology. 2.  A 16 mm ill-defined anterior right upper lobe pulmonary nodule-differential includes primary malignancy, metastatic disease, and less likely sarcoid or infection. 3.  Right greater than left basilar pleural effusion with mild pulmonary edema. 4.  Multifocal peripheral consolidations with nodularity differential includes atypical infectious process, and malignancy. Electronically Verified and Signed by Attending Radiologist: Atif Daigle MD 7/7/2025 3:51 PM Workstation: [id number]    US ABDOMEN COMPLETE (CPT=76700)  Result Date: 7/7/2025  CONCLUSION: Borderline hepatomegaly with steatosis. Gallbladder adenomyomatosis. Small gallstone. No gallbladder wall thickening. Nonspecific pericholecystic fluid. Negative sonographic Lopez sign. Splenomegaly. Electronically Verified and Signed by Attending Radiologist: Danilo Neumann MD 7/7/2025 9:45 AM Workstation: [id number]    XR CHEST AP PORTABLE  (CPT=71045)  Result Date: 7/6/2025  CONCLUSION: Multifocal bilateral patchy opacities may represent pneumonia or edema. Electronically Verified and Signed by Attending Radiologist: Atif Daigle MD 7/6/2025 1:58 PM Workstation: [id number]     Assessment & Plan:    Karel Ford is a 64 year old male with a history of hypertension and hyperlipidemia who was admitted 7/6 with cough, low-grade fevers and shortness of breath. Imaging showed concerns for multifocal pneumonia, he was found to be hypoxic on exam requiring oxygen supplementation and imaging showed prominent mediastinal adenopathy  and splenomegaly for which hematology has been consulted.    I reviewed the imaging findings at the bedside with the patient and his family.  We discussed possible diagnosis of an underlying malignancy although tissue is required.  Agree with thoracentesis and EBUS while the patient remains admitted.  If this remains nondiagnostic we can consider an outpatient PET/CT to identify another target outside of the mediastinum.  Hypercalcemia seems more related to vitamin D toxicity over malignancy given easy correction with IV fluids.  Endocrinology following.  The patient and family had ample time for questions which were answered to the best of my abilities at this current time.  I will follow peripherally and discuss further pending pathology.  If the patient is clinically improved he may be discharged before pathology has been finalized and we can schedule outpatient follow-up.    High risk     Jacinto Elena,     [facility] Hematology/Oncology  [facility]     This note was created using a voice-recognition transcribing system. Incorrect words or phrases may have been missed during proofreading. Please interpret accordingly.         [1]   Past Medical History:   Essential hypertension    Hyperlipidemia   [2] History reviewed. No pertinent surgical history.  [3]    [COMPLETED] magnesium oxide (Mag-Ox) tab 800 mg  800 mg Oral Once    [START ON 7/9/2025] predniSONE (Deltasone) tab 60 mg  60 mg Oral Daily with breakfast    [COMPLETED] magnesium oxide (Mag-Ox) tab 400 mg  400 mg Oral Once    [COMPLETED] albuterol (Ventolin) (2.5 MG/3ML) 0.083% nebulizer solution 2.5 mg  2.5 mg Nebulization Once    [COMPLETED] cefTRIAXone (Rocephin) 2 g in sodium chloride 0.9% 100mL IVPB-ELKE  2 g Intravenous Once    [COMPLETED] azithromycin (Zithromax) 500 mg in sodium chloride 0.9% 250mL IVPB premix  500 mg Intravenous Once    [COMPLETED] sodium chloride 0.9 % IV bolus 1,000 mL  1,000 mL Intravenous Once    [COMPLETED]  sodium chloride 0.9 % IV bolus 1,000 mL  1,000 mL Intravenous Once    [COMPLETED] methylPREDNISolone sodium succinate (Solu-MEDROL) injection 125 mg  125 mg Intravenous Once    [COMPLETED] albuterol (Ventolin) (2.5 MG/3ML) 0.083% nebulizer solution 2.5 mg  2.5 mg Nebulization Once    sodium chloride 0.9% infusion   Intravenous Continuous    heparin (Porcine) 5000 UNIT/ML injection 5,000 Units  5,000 Units Subcutaneous 2 times per day    azithromycin (Zithromax) tab 500 mg  500 mg Oral Daily    ampicillin-sulbactam (Unasyn) 3 g in sodium chloride 0.9% 100mL IVPB-ELKE  3 g Intravenous q6h    benzonatate (Tessalon) cap 200 mg  200 mg Oral TID PRN    guaiFENesin (Robitussin) 100 MG/5 ML oral liquid 200 mg  200 mg Oral Q4H PRN    sodium chloride (Saline Mist) 0.65 % nasal solution 1 spray  1 spray Each Nare Q3H PRN    ipratropium-albuterol (Duoneb) 0.5-2.5 (3) MG/3ML inhalation solution 3 mL  3 mL Nebulization Q6H PRN    ipratropium-albuterol (Duoneb) 0.5-2.5 (3) MG/3ML inhalation solution 3 mL  3 mL Nebulization Q6H WA    benzocaine-menthol (Cepacol) lozenge 1 lozenge  1 lozenge Oral PRN   [4]   Allergies  Allergen Reactions    Citrated Pterylmonoglutamic Acid [Glutamic Acid] UNKNOWN    Dander UNKNOWN    [place] UNKNOWN    Ragweed UNKNOWN    [place] UNKNOWN    Lisinopril RASH   [5]   Family History  Problem Relation Age of Onset    Lung Disorder Mother     Heart Disease Father

## 2025-07-08 NOTE — PROGRESS NOTES
[facility]  part of [facility]    Progress Note    Karel Ford Patient Status:  Inpatient    10/6/1960 MRN [id number]   Location [facility] 4W/SW/SE Attending Tawana Mireles MD   Hosp Day # 2 PCP Martin Adams MD     Subjective:  Feels okay     Endocrine History:  He was noted to be hypercalcemic  Calcium  13.5 , albumin  2.7   PTH low  Vitamin D 25 in May 2025 --. Was recommended vitamin D 2000 unit daily by PCP . Wife bought 50,000 unit daily by mistake whch I what he has been taking     Denies any thyroid problems  Denies excess calcium intake via supplements or diet     A comprehensive 10 point review of systems was completed.  Pertinent positives and negatives noted in the the HPI.      Objective/Physical Exam:  Physical Exam:   Vital Signs:  Blood pressure 123/73, pulse 55, temperature 97.8 °F (36.6 °C), temperature source Oral, resp. rate 24, weight 186 lb (84.4 kg), SpO2 92%.  General Appearance:  alert, well developed, in no acute distress  Head: Atraumatic  Eyes:  normal conjunctivae, sclera., normal sclera and normal pupils  Throat/Neck: normal sound to voice. Normal hearing, normal speech  Respiratory:  Speaking in full sentences, non-labored. no increased work of breathing, no audible wheezing    Neuro: motor grossly intact, moving all extremities without difficulty  Psychiatric:  oriented to time, self, and place       Labs:  Pertinent data reviewed    Assessment/Plan:  Problem List[1]    Hypercalcemia: secondary to vitamin D toxicity accidental  Calcium 10.6 now after IVF  Although albumin is low  Vot D I 199  High calcium is likely due t high vitamin D  Will continue to monitor calcium : if not trending  down will start calcitonin     Will follow     Farhana Acevedo MD         [1]   Patient Active Problem List  Diagnosis    Essential hypertension    Hyperlipidemia    Multifocal pneumonia    Acute respiratory failure with hypoxia (HCC)    CARLO (acute kidney  injury)    Hypercalcemia

## 2025-07-08 NOTE — PLAN OF CARE
Karel is A&Ox4. Weaned to 1L O2. Vitals stable. Ambulating hallways. CDIFF sample collected, results pending. Plan for thoracentesis tomorrow and Bronchoscopy Friday. Oncology consult in place. Family and Patient aware of plan of care.     Problem: Patient Centered Care  Goal: Patient preferences are identified and integrated in the patient's plan of care  Description: Interventions:  - What would you like us to know as we care for you? I live at home with wife  - Provide timely, complete, and accurate information to patient/family  - Incorporate patient and family knowledge, values, beliefs, and cultural backgrounds into the planning and delivery of care  - Encourage patient/family to participate in care and decision-making at the level they choose  - Honor patient and family perspectives and choices  Outcome: Progressing     Problem: RISK FOR INFECTION - ADULT  Goal: Absence of fever/infection during anticipated neutropenic period  Description: INTERVENTIONS  - Monitor WBC  - Administer growth factors as ordered  - Implement neutropenic guidelines  Outcome: Progressing     Problem: SAFETY ADULT - FALL  Goal: Free from fall injury  Description: INTERVENTIONS:  - Assess pt frequently for physical needs  - Identify cognitive and physical deficits and behaviors that affect risk of falls.  - [place] fall precautions as indicated by assessment.  - Educate pt/family on patient safety including physical limitations  - Instruct pt to call for assistance with activity based on assessment  - Modify environment to reduce risk of injury  - Provide assistive devices as appropriate  - Consider OT/PT consult to assist with strengthening/mobility  - Encourage toileting schedule  Outcome: Progressing     Problem: RESPIRATORY - ADULT  Goal: Achieves optimal ventilation and oxygenation  Description: INTERVENTIONS:  - Assess for changes in respiratory status  - Assess for changes in mentation and behavior  - Position to facilitate  oxygenation and minimize respiratory effort  - Oxygen supplementation based on oxygen saturation or ABGs  - Provide Smoking Cessation handout, if applicable  - Encourage broncho-pulmonary hygiene including cough, deep breathe, Incentive Spirometry  - Assess the need for suctioning and perform as needed  - Assess and instruct to report SOB or any respiratory difficulty  - Respiratory Therapy support as indicated  - Manage/alleviate anxiety  - Monitor for signs/symptoms of CO2 retention  Outcome: Progressing

## 2025-07-08 NOTE — PAYOR COMM NOTE
--------------  ADMISSION REVIEW     Payor: JEVON FAITH  Subscriber #:  [id number]  Authorization Number: [id number]    Admit date: 7/6/25  Admit time:  4:09 PM      7/6 Patient Seen in: [facility] Emergency Department    History  Chief Complaint   Patient presents with    Cough/URI     Stated Complaint: URI    Physical Exam    ED Triage Vitals [07/06/25 1149]   /35   Pulse 101   Resp 22   Temp 99 °F (37.2 °C)   Temp src Oral   SpO2 92 %   O2 Device None (Room air)       Current Vitals:   Vital Signs  BP: 112/69  Pulse: 92  Resp: 24  Temp: 99 °F (37.2 °C)  Temp src: Oral  MAP (mmHg): 83    Oxygen Therapy  SpO2: 92 %  O2 Device: Nasal cannula  O2 Flow Rate (L/min): 3 L/min    ED Course  Labs Reviewed   BASIC METABOLIC PANEL (8) - Abnormal; Notable for the following components:       Result Value    Glucose 103 (*)     Sodium 132 (*)     BUN 39 (*)     Creatinine 1.76 (*)     BUN/CREA Ratio 22.2 (*)     Calcium, Total 13.5 (*)     eGFR-Cr 43 (*)     All other components within normal limits   HEPATIC FUNCTION PANEL (7) - Abnormal; Notable for the following components:    AST 78 (*)     ALT 85 (*)     Alkaline Phosphatase 636 (*)     Bilirubin, Direct 0.4 (*)     Total Protein 8.9 (*)     All other components within normal limits   CBC WITH DIFFERENTIAL WITH PLATELET - Abnormal; Notable for the following components:    RDW-SD 49.1 (*)     All other components within normal limits   PRO BETA NATRIURETIC PEPTIDE - Abnormal; Notable for the following components:    Pro-Beta Natriuretic Peptide 299 (*)     All other components within normal limits   PTH INTACT WITH MINERALS - Abnormal; Notable for the following components:    Creatinine 1.71 (*)     Calcium, Total 13.0 (*)     Pth Intact <6.3 (*)     All other components within normal limits   LACTIC ACID, PLASMA - Normal   MAGNESIUM - Normal   LIPASE - Normal   SARS-COV-2/FLU A AND B/RSV BY PCR (GENEXPERT) - Normal    Narrative:     This test is intended for  the qualitative detection and differentiation of SARS-CoV-2, influenza A, influenza B, and respiratory syncytial virus (RSV) viral RNA in nasopharyngeal or nares swabs from individuals suspected of respiratory viral infection consistent with COVID-19 by their healthcare provider. Signs and symptoms of respiratory viral infection due to SARS-CoV-2, influenza, and RSV can be similar.    Test performed using the Xpert Xpress SARS-CoV-2/FLU/RSV (real time RT-PCR)  assay on the GeneXpert instrument, [employer], [place] [address].   This test is being used under the Food and Drug Administration's Emergency Use Authorization.    The authorized Fact Sheet for Healthcare Providers for this assay is available upon request from the laboratory.   SCAN SLIDE   PARATHYROID HORMONE-RELATED PEPTIDE   URINALYSIS WITH CULTURE REFLEX   RAINBOW DRAW BLUE   BLOOD CULTURE   BLOOD CULTURE       MDM     64-year-old male with history of hypertension and hyperlipidemia and who recently has had some issues with allergic reactions of unknown etiology with leg swelling, redness, and shortness of breath presents today with cough, fever, fatigue, and confusion.  Family reports that symptoms been worsening over the last 3 days.  He is also had a small amount hemoptysis.  They deny any current leg swelling, chest pain, or other symptoms.    On exam, hypoxic to the high 80s on room air, slightly dyspneic, bilateral lower lung crackles, no significant extremity edema/swelling,    Differential: Pneumonia, viral URI, CHF,    Patient evaluated with labs which showed likely prerenal CAROL, hypercalcemia at 13.5, mild liver transaminase elevation, mild proBNP elevation, no leukocytosis  Parathyroid hormones ordered.    I personally reviewed and interpreted the patient's chest x-ray which showed likely multifocal pneumonia    Patient ordered for IV fluids, ceftriaxone, and azithromycin.  He was also given albuterol nebulizer and Solu-Medrol for wheezing    On  reexamination, vitals remained stable and symptoms improved.  Patient remains on liters nasal cannula.    Pulmonology consulted without immediate recommendations.    Patient admitted for further management.    Admission disposition: 7/6/2025      Disposition and Plan     Clinical Impression:  1. Multifocal pneumonia    2. Acute respiratory failure with hypoxia (HCC)    3. CAROL (acute kidney injury)    4. Hypercalcemia         Disposition:  Admit  7/6/2025            History and Physical            HPI:   Karel Ford is a(n) 64 year old male with a PMH of HTN, recent Er visit for allergic reaction s/p medrol dose pack who now presents with cough, fevers and sob since Friday.  He denies any chest pain.  No sick contacts.  No n/v/abd pain.  In the ED CXR showed multifocal pneumonia.  He was also found to be hypoxic on exam.  Improved with NC.  He will be admitted.              Lab Results   Component Value Date     WBC 10.1 07/06/2025     HGB 13.0 07/06/2025     HCT 40.0 07/06/2025     .0 07/06/2025     CREATSERUM 1.71 (H) 07/06/2025     BUN 39 (H) 07/06/2025      (L) 07/06/2025     K 4.2 07/06/2025     CL 99 07/06/2025     CO2 27.0 07/06/2025      (H) 07/06/2025     CA 13.0 (HH) 07/06/2025     ALB 3.2 07/06/2025     ALKPHO 636 (H) 07/06/2025     BILT 0.6 07/06/2025     TP 8.9 (H) 07/06/2025     AST 78 (H) 07/06/2025     ALT 85 (H) 07/06/2025       XR CHEST AP PORTABLE  (CPT=71045)  Result Date: 7/6/2025  CONCLUSION: Multifocal bilateral patchy opacities may represent pneumonia or edema.       Assessment/Plan:       Multifocal pneumonia, possible gram neg pneumonia  - started broad spectrum IV abx  - await urine legionella  - sputum and blood cxs pending  - pulm consulted     Possible acute copd exacerbation  - pt has a smoking history, not currently tobacco user  - started nebs and steroids  - abx as above  - pulm on consult     Hypercalcemia  - started IVFs  - checking PTH  - recheck in am     Acute  kidney injury   - possibly prerenal?  - started IVFs  - recheck in am     Acute transaminitis  - check abd us  - trend lfts  - pt used to be an alcoholic but previous lfts were wnl - will monitor cloesly     DM  - accuchecks,  ISS     HTN  - bp stable without meds, monitor for now     HL  - hold statin given elevated lfts          7/7:     Pulmonology Consult Note:       HPI: 65 yo male with hx of recent allergic reaction requiring steroids, HTN now admitted with coughing, shortness of breath, and fevers x 3 days.   No sick contacts.   Denies previous hx of pulmonary problems. No hx of asthma, COPD, recurrent PNAs  CXR concerning for PNA  Started on abx-unasyn/azithro along with nebs and solumedrol  Pt is afebrile on 4 LNC supp 02    Recent Labs   Lab 07/06/25  1245 07/07/25  0652   RBC 4.54 4.17*   HGB 13.0 12.0*   HCT 40.0 36.6*   MCV 88.1 87.8   MCH 28.6 28.8   MCHC 32.5 32.8   RDW 15.0 15.2*   NEPRELIM 6.47 7.32   WBC 10.1 10.6   .0 215.0      Lab 07/06/25  1245 07/06/25  1358 07/07/25  0652   *  --  154*   BUN 39*  --  28*   CREATSERUM 1.76* 1.71* 1.14   EGFRCR 43*  --  72   CA 13.5* 13.0* 10.6*   ALB 3.2  --   --    *  --  138   K 4.2  --  4.1   CL 99  --  108   CO2 27.0  --  23.0   ALKPHO 636*  --   --    AST 78*  --   --    ALT 85*  --   --    BILT 0.6  --   --    TP 8.9*  --   --          ASSESSMENT/PLAN:  Multifocal PNA  -CXR with b/l infiltrates  -started on empiric unasyn/azithro  -sputum cx ordered  -urine leg neg   -cont supp 02-currently on 4 LNC  -nebs  -can continue short course solumedrol  -check chest CT scan given CXR findings and occupational exposures (painting, working with wood)     Recent allergic reaction  -s/p tx with steroids last month  -saw allergy and ENT      Endocrinology Consult:   Reason for Consultation:  Hypercalcemia     History of Present Illness:  Karel Ford is a a(n) 64 year old male with PMH as below who is admitted for management of pneumonia.   He was  noted to be hypercalcemic  Calcium  13.5 , albumin  2.7   PTH low  Vitamin D 25 in May 2025 --. Was recommended vitamin D 2000 unit daily by PCP . Wife bought 50,000 unit daily by mistake whch I what he has been taking     Impression and Plan  Hypercalcemia  Calcium 10.6 now after IVF  Although albumin is low  Vot D I 199  High calcium is likely due t high vitamin D  Will recheck calcium : if not trending  down will start calcitonin   Etiology: PTH is low , hence not PTH related  QUINTERO ordered: SPEP, vitamin D 25 OH, vitamin D 1,25 OH, PThrp  7-8 am cortiol  TSh was normal recently      Hospitalist Progress Note:   Chief Complaint: pneumonia          Lab Results   Component Value Date     WBC 10.6 07/07/2025     HGB 12.0 07/07/2025     HCT 36.6 07/07/2025     .0 07/07/2025     CREATSERUM 1.14 07/07/2025     BUN 28 07/07/2025      07/07/2025     K 4.1 07/07/2025      07/07/2025     CO2 23.0 07/07/2025      07/07/2025     CA 10.6 07/07/2025     ALB 2.7 07/07/2025     ALKPHO 521 07/07/2025     BILT 0.5 07/07/2025     TP 7.9 07/07/2025     AST 41 07/07/2025     ALT 70 07/07/2025     MG 1.6 07/07/2025     PHOS 4.2 07/06/2025        US ABDOMEN COMPLETE (CPT=76700)  Result Date: 7/7/2025  CONCLUSION: Borderline hepatomegaly with steatosis. Gallbladder adenomyomatosis. Small gallstone. No gallbladder wall thickening. Nonspecific pericholecystic fluid. Negative sonographic Lopez sign. Splenomegaly.      XR CHEST AP PORTABLE  (CPT=71045)  Result Date: 7/6/2025  CONCLUSION: Multifocal bilateral patchy opacities may represent pneumonia or edema.     Plan:      Multifocal pneumonia, possible gram neg pneumonia  - started broad spectrum IV abx  - await urine legionella - neg  - sputum and blood cxs pending  - pulm consulted  - ct chest pending     Possible acute copd exacerbation  - pt has a smoking history, not currently tobacco user  - started nebs and steroids  - abx as above  - pulm on consult      Hypercalcemia  - started IVFs  - recheck in am - improving but still elevated, endo consulted  - pth low, concern for malignancy induced hypercalcemia - will check ct a/p and tumor markers     Acute kidney injury   - possibly prerenal?  - started IVFs  - recheck in am - improved     Acute transaminitis  - check abd us - reviewed - showing hepatic steatosis and hepatomegaly; also shows GB adenomyomatosis - will need close outpt follow up   - trend lfts  - pt used to be an alcoholic but previous lfts were wnl - trending down now     DM  - accuchecks,  ISS     HTN  - bp stable without meds, monitor for now     HL  - hold statin given elevated lfts         Global A/P  - reviewed labs and vitals from today  - reviewed notes of the day  - cbc, bmp, mag ordered for tomorrow  - discussed need to stay in the hospital today due to above  - cont IV abx  - discussed with patient/RN and care team  - dispo pending clinical course        Medications 07/05/25 07/06/25 07/07/25   albuterol (Ventolin) (2.5 MG/3ML) 0.083% nebulizer solution 2.5 mg  Dose: 2.5 mg  Freq: Once Route: Nebulization  Start: 07/06/25 1416 End: 07/06/25 1436   Order specific questions:        1436 LL-Given           albuterol (Ventolin) (2.5 MG/3ML) 0.083% nebulizer solution 2.5 mg  Dose: 2.5 mg  Freq: Once Route: Nebulization  Start: 07/06/25 1259 End: 07/06/25 1330   Order specific questions:        1330 LL-Given           ampicillin-sulbactam (Unasyn) 3 g in sodium chloride 0.9% 100mL IVPB-ELKE  Dose: 3 g  Freq: Every 6 hours Route: IV  Last Dose: 3 g (07/07/25 1718)  Start: 07/06/25 1700 End: 07/11/25 0459   Admin Instructions:   (Pharmacist may adjust total duration to 5 days if prior doses received.)   Order specific questions:        1636 AG-New Bag   2212 RG-New Bag       0532 RG-New Bag   1104 AR-New Bag   1718 AR-New Bag     2300          azithromycin (Zithromax) 500 mg in sodium chloride 0.9% 250mL IVPB premix  Dose: 500 mg  Freq: Once Route:  IV  Last Dose: 500 mg (07/06/25 1424)  Start: 07/06/25 1331 End: 07/06/25 1524   Admin Instructions:   Consider alternative antibiotic if baseline QTc >500 ms   Order specific questions:        1424 ZE-New Bag           azithromycin (Zithromax) tab 500 mg  Dose: 500 mg  Freq: Daily Route: OR  Start: 07/07/25 0930 End: 07/10/25 0929   Admin Instructions:   (Pharmacist may adjust total duration to 3 days if prior doses received.)  Consider alternative antibiotic if baseline QTc >500 ms   Order specific questions:         1002 AR-Given          cefTRIAXone (Rocephin) 2 g in sodium chloride 0.9% 100mL IVPB-ELKE  Dose: 2 g  Freq: Once Route: IV  Last Dose: Stopped (07/06/25 1426)  Start: 07/06/25 1331 End: 07/06/25 1426   Admin Instructions:   Ceftriaxone must NOT be administered simultaneously with calcium containing IV solutions. Includes Y-site as well.  In patients other than neonates ceftriaxone and calcium containing products may administered sequentially, provided the line is flushed in between administrations.   Order specific questions:        1356 ZE-New Bag   1426 ZE-Stopped          heparin (Porcine) 5000 UNIT/ML injection 5,000 Units  Dose: 5,000 Units  Freq: 2 times per day Route: SC  Start: 07/06/25 2100 2008 MN-Given        1002 AR-Given   2100         ipratropium-albuterol (Duoneb) 0.5-2.5 (3) MG/3ML inhalation solution 3 mL  Dose: 3 mL  Freq: Every 6 hours while awake (RT) Route: Nebulization  Start: 07/06/25 1645   Order specific questions:        1705 JF-Given          (0900 KN)-Not Given [C]   1012 KN-Given   1413 KN-Given     1930 JN-Given          magnesium oxide (Mag-Ox) tab 400 mg  Dose: 400 mg  Freq: Once Route: OR  Start: 07/07/25 0745 End: 07/07/25 1002      1002 AR-Given          methylPREDNISolone sodium succinate (Solu-MEDROL) injection 125 mg  Dose: 125 mg  Freq: Once Route: IV  Start: 07/06/25 1416 End: 07/06/25 1419   Admin Instructions:   Reconstitute with 2ml sterile water or saline      1419 ZE-Given           methylPREDNISolone sodium succinate (Solu-MEDROL) injection 60 mg  Dose: 60 mg  Freq: Every 8 hours Route: IV  Start: 07/06/25 2200   Admin Instructions:   Reconstitute with 2ml sterile water or saline     2212 RG-Given        0532 RG-Given   1502 AR-Given   2200        sodium chloride 0.9 % IV bolus 1,000 mL  Dose: 1,000 mL  Freq: Once Route: IV  Last Dose: Stopped (07/06/25 1528)  Start: 07/06/25 1346 End: 07/06/25 1528     1428 ZE-New Bag   1528 ZE-Stopped           sodium chloride 0.9 % IV bolus 1,000 mL  Dose: 1,000 mL  Freq: Once Route: IV  Last Dose: Stopped (07/06/25 1430)  Start: 07/06/25 1332 End: 07/06/25 1430     1335 ZE-New Bag   1430 ZE-Stopped          Followed by   sodium chloride 0.9% infusion  Rate: 125 mL/hr  Freq: Continuous Route: IV  Start: 07/06/25 1431 End: 07/06/25 1505        1505-D/C'd          Continuous Meds Sorted by Name  for Karel Ford as of 7/5/25 through 7/7/25    1 Day 3 Days 7 Days 10 Days < Today >   Legend:       Medications 07/05/25 07/06/25 07/07/25   sodium chloride 0.9% infusion  Rate: 125 mL/hr  Freq: Continuous Route: IV  Start: 07/06/25 1615     1630 AG-New Bag        0032 RG-New Bag   1008 AR-New Bag   1719 AR-Rate/Dose Change         sodium chloride 0.9 % IV bolus 1,000 mL  Dose: 1,000 mL  Freq: Once Route: IV  Last Dose: Stopped (07/06/25 1430)  Start: 07/06/25 1332 End: 07/06/25 1430     1335 ZE-New Bag   1430 ZE-Stopped          Followed by   sodium chloride 0.9% infusion  Rate: 125 mL/hr  Freq: Continuous Route: IV  Start: 07/06/25 1431 End: 07/06/25 1505        1505-D/C'd          PRN Meds Sorted by Name  for Karel Ford as of 7/5/25 through 7/7/25    1 Day 3 Days 7 Days 10 Days < Today >   Legend:       Medications 07/05/25 07/06/25 07/07/25   benzocaine-menthol (Cepacol) lozenge 1 lozenge  Dose: 1 lozenge  Freq: As needed Route: OR  PRN Reason: sore throat  Start: 07/06/25 1951 2008 MN-Given        0541 RG-Given           benzonatate (Tessalon) cap 200 mg  Dose: 200 mg  Freq: 3 times daily PRN Route: OR  PRN Reason: cough  Start: 07/06/25 1610   Admin Instructions:   Do not crush         guaiFENesin (Robitussin) 100 MG/5 ML oral liquid 200 mg  Dose: 200 mg  Freq: Every 4 hours PRN Route: OR  PRN Reason: cough  Start: 07/06/25 1610      0539 RG-Given              Vitals (last day)       Date/Time Temp Pulse Resp BP SpO2 Weight O2 Device O2 Flow Rate (L/min) [facility]    07/07/25 1930 -- -- -- -- 93 % -- Nasal cannula 4 L/min JN    07/07/25 1315 97.7 °F (36.5 °C) 92 24 129/79 91 % -- Nasal cannula 4 L/min AR    07/07/25 0917 -- 78 -- -- 91 % -- Nasal cannula 4 L/min AR    07/07/25 0304 -- -- -- -- 91 % -- Nasal cannula 4 L/min RG    07/07/25 0301 97.8 °F (36.6 °C) 83 26 133/71 89 % -- Nasal cannula 3 L/min RG    07/06/25 2014 97.5 °F (36.4 °C) 87 24 126/68 93 % -- Nasal cannula 4 L/min MN    07/06/25 1900 -- 94 -- -- -- -- -- -- CY    07/06/25 1625 -- -- -- -- -- 186 lb (84.4 kg) -- -- AG    07/06/25 1612 97.5 °F (36.4 °C) -- -- -- 93 % -- Nasal cannula 4 L/min AG    07/06/25 1611 -- 91 22 116/63 91 % -- Nasal cannula 3 L/min AG    07/06/25 1610 -- -- -- -- 86 % -- None (Room air) -- AG    07/06/25 1545 -- 92 24 112/69 92 % -- Nasal cannula 3 L/min GD    07/06/25 1530 -- 93 31 114/75 91 % -- Nasal cannula 3 L/min ZE    07/06/25 1515 -- 93 19 109/66 94 % -- Nasal cannula 3 L/min ZE    07/06/25 1500 -- 91 31 108/54 93 % -- Nasal cannula 3 L/min ZE    07/06/25 1445 -- 91 34 112/67 98 % -- Nasal cannula 3 L/min ZE    07/06/25 1430 -- 91 31 -- 94 % -- Nasal cannula 3 L/min ZE    07/06/25 1345 -- 92 27 108/68 95 % -- Nasal cannula 3 L/min ZE    07/06/25 1315 -- 91 33 120/71 91 % -- Nasal cannula 3 L/min ZE    07/06/25 1239 -- -- -- -- -- -- None (Room air) -- ZE    07/06/25 1230 -- 91 36 117/68 90 % -- Nasal cannula 3 L/min ZE    07/06/25 1229 -- -- -- -- 92 % -- Nasal cannula 3 L/min ZE    07/06/25 1215 -- 99 -- -- 83 % -- None (Room air) -- ZE     07/06/25 1149 99 °F (37.2 °C) 101 22 103/35 92 % 190 lb 0.6 oz (86.2 kg) None (Room air) -- VM

## 2025-07-08 NOTE — PROGRESS NOTES
[facility]  part of [facility]    Progress Note    Karel Ford Patient Status:  Inpatient    10/6/1960 MRN [id number]   Location [facility] 4W/SW/SE Attending Tawana Mireles MD   Hosp Day # 2 PCP Martin Adams MD     Chief Complaint: pneumonia    SUBJECTIVE:    No acute events overnight.  Patient denies chest pain, sob.  No fevers/chills.  No n/v/abd pain.  Feels a little better today.     10 ROS completed and otherwise negative.    OBJECTIVE:  Vital signs in last 24 hours:  /64 (BP Location: Right arm)   Pulse 82   Temp 97.5 °F (36.4 °C) (Oral)   Resp 20   Wt 186 lb (84.4 kg)   SpO2 93%   BMI 30.95 kg/m²     Intake/Output:    Intake/Output Summary (Last 24 hours) at 2025 1437  Last data filed at 2025 0554  Gross per 24 hour   Intake 3144.59 ml   Output --   Net 3144.59 ml       Wt Readings from Last 3 Encounters:   25 186 lb (84.4 kg)   25 190 lb (86.2 kg)   25 189 lb (85.7 kg)       Exam     Gen: more comfortable today  Pulm: coarse bs, moving air  CV: Heart with regular rate and rhythm  Abd: Abdomen soft, nontender, bowel sounds present  Neuro: No acute focal deficits  MSK: moves extremities  Skin: Warm and dry  Psych: Normal affect  Ext: no c/c/e    Data Review:     Labs:   Lab Results   Component Value Date    WBC 11.0 2025    HGB 10.2 2025    HCT 31.1 2025    .0 2025    CREATSERUM 0.86 2025    BUN 25 2025     2025    K 3.9 2025     2025    CO2 26.0 2025     2025    CA 10.2 2025    ALB 2.6 2025    ALKPHO 410 2025    BILT 0.3 2025    TP 7.4 2025    AST 17 2025    ALT 44 2025    MG 1.5 2025       Imaging: reviewed    CT ABDOMEN+PELVIS(CPT=74176)  Result Date: 2025  CONCLUSION: 1.  Moderate splenomegaly. 2.  Mild nonspecific lymphadenopathy in the gastrohepatic ligament, and distal iliac chain  bilaterally. 3.  Please see chest CT for differential. Electronically Verified and Signed by Attending Radiologist: Atif Daigle MD 7/7/2025 11:51 PM Workstation: [id number]    CT CHEST (CPT=71250)  Result Date: 7/7/2025  CONCLUSION: 1.  Marked mediastinal lymphadenopathy. Coexistent splenomegaly and gastrohepatic ligament lymphadenopathy. Differential:lymphoma, metastases, and less likely sarcoid or benign etiology. 2.  A 16 mm ill-defined anterior right upper lobe pulmonary nodule-differential includes primary malignancy, metastatic disease, and less likely sarcoid or infection. 3.  Right greater than left basilar pleural effusion with mild pulmonary edema. 4.  Multifocal peripheral consolidations with nodularity differential includes atypical infectious process, and malignancy. Electronically Verified and Signed by Attending Radiologist: Atif Daigle MD 7/7/2025 3:51 PM Workstation: [id number]    US ABDOMEN COMPLETE (CPT=76700)  Result Date: 7/7/2025  CONCLUSION: Borderline hepatomegaly with steatosis. Gallbladder adenomyomatosis. Small gallstone. No gallbladder wall thickening. Nonspecific pericholecystic fluid. Negative sonographic Lopez sign. Splenomegaly. Electronically Verified and Signed by Attending Radiologist: Danilo Neumann MD 7/7/2025 9:45 AM Workstation: [id number]    XR CHEST AP PORTABLE  (CPT=71045)  Result Date: 7/6/2025  CONCLUSION: Multifocal bilateral patchy opacities may represent pneumonia or edema. Electronically Verified and Signed by Attending Radiologist: Atif Daigle MD 7/6/2025 1:58 PM Workstation: [id number]      Meds:   Current Hospital Medications[1]      Assessment  Problem List[2]    Plan:     Multifocal pneumonia, possible gram neg pneumonia  - started broad spectrum IV abx  - await urine legionella - neg  - sputum and blood cxs pending  - pulm consulted  - ct chest reviewed - discussed with pulm - plan thoracentesis tomorrow for rt pleural effusion  - plan  bronch with EBUS on Friday  - heme/onc consult for concern for malignancy     Possible acute copd exacerbation  - pt has a smoking history, not currently tobacco user  - started nebs and steroids  - abx as above  - pulm on consult     Hypercalcemia  - started IVFs  - recheck in am - improving but still elevated, endo consulted  - pth low, concern for malignancy induced hypercalcemia - will check ct a/p and tumor markers - reviewed  - heme/onc consulted     Acute kidney injury   - possibly prerenal?  - started IVFs  - recheck in am - improved     Acute transaminitis  - check abd us - reviewed - showing hepatic steatosis and hepatomegaly; also shows GB adenomyomatosis - will need close outpt follow up   - trend lfts  - pt used to be an alcoholic but previous lfts were wnl - trending down now     DM  - accuchecks,  ISS     HTN  - bp stable without meds, monitor for now     HL  - hold statin given elevated lfts     Global A/P  - reviewed labs and vitals from today  - reviewed notes of the day  - cbc, bmp, mag ordered for tomorrow  - discussed need to stay in the hospital today due to above  - cont IV abx  - discussed with patient/RN and care team  - dispo pending clinical course          Supplementary Documentation:   DVT Mechanical Prophylaxis:   SCDs,    DVT Pharmacologic Prophylaxis   Medication    heparin (Porcine) 5000 UNIT/ML injection 5,000 Units                Code Status: Not on file  Angeles: No urinary catheter in place  Angeles Duration (in days):   Central line:    MYLES: 7/10/2025                           MDM: High complexity-acute illness posing a threat to life. IV meds requiring close inpatient monitoring. I personally spent time on chart/note review, review of labs/imaging, discussion with patient, physical exam, discussion with staff, writing progress note, and discussion of plan of care.         [1]   Current Facility-Administered Medications   Medication Dose Route Frequency    [START ON 7/9/2025]  predniSONE (Deltasone) tab 60 mg  60 mg Oral Daily with breakfast    sodium chloride 0.9% infusion   Intravenous Continuous    heparin (Porcine) 5000 UNIT/ML injection 5,000 Units  5,000 Units Subcutaneous 2 times per day    azithromycin (Zithromax) tab 500 mg  500 mg Oral Daily    ampicillin-sulbactam (Unasyn) 3 g in sodium chloride 0.9% 100mL IVPB-ELKE  3 g Intravenous q6h    benzonatate (Tessalon) cap 200 mg  200 mg Oral TID PRN    guaiFENesin (Robitussin) 100 MG/5 ML oral liquid 200 mg  200 mg Oral Q4H PRN    sodium chloride (Saline Mist) 0.65 % nasal solution 1 spray  1 spray Each Nare Q3H PRN    ipratropium-albuterol (Duoneb) 0.5-2.5 (3) MG/3ML inhalation solution 3 mL  3 mL Nebulization Q6H PRN    ipratropium-albuterol (Duoneb) 0.5-2.5 (3) MG/3ML inhalation solution 3 mL  3 mL Nebulization Q6H WA    benzocaine-menthol (Cepacol) lozenge 1 lozenge  1 lozenge Oral PRN   [2]   Patient Active Problem List  Diagnosis    Essential hypertension    Hyperlipidemia    Multifocal pneumonia    Acute respiratory failure with hypoxia (HCC)    CAROL (acute kidney injury)    Hypercalcemia

## 2025-07-08 NOTE — PROGRESS NOTES
[facility]  part of [facility]    Progress Note    Karel Ford Patient Status:  Inpatient    10/6/1960 MRN [id number]   Location [facility] 4W/SW/SE Attending Tawana Mireles MD   Hosp Day # 2 PCP Martin Adams MD     Subjective:   Seen and examined while sitting in chair. Multiple family members present. Niece translating per patient request. Dyspnea improved. Ongoing productive cough with white phlegm. No hemoptysis. Has appetite. Having loose stools. No nausea or vomiting. No fever or chills. On 2 L O2.    Objective:   Blood pressure 123/73, pulse 55, temperature 97.8 °F (36.6 °C), temperature source Oral, resp. rate 24, weight 186 lb (84.4 kg), SpO2 92%.  Physical Exam  Vitals and nursing note reviewed.   Constitutional:       General: He is awake. He is not in acute distress.     Appearance: Normal appearance.      Interventions: Nasal cannula in place.   HENT:      Head: Normocephalic and atraumatic.   Cardiovascular:      Rate and Rhythm: Normal rate and regular rhythm.   Pulmonary:      Effort: No respiratory distress.      Breath sounds: Examination of the right-lower field reveals decreased breath sounds. Decreased breath sounds and rales present. No wheezing or rhonchi.   Abdominal:      General: Bowel sounds are normal.      Palpations: Abdomen is soft.      Tenderness: There is no abdominal tenderness.   Musculoskeletal:      Cervical back: Normal range of motion and neck supple.      Right lower leg: No edema.      Left lower leg: No edema.   Skin:     General: Skin is warm and dry.   Neurological:      General: No focal deficit present.      Mental Status: He is alert and oriented to person, place, and time.   Psychiatric:         Mood and Affect: Mood normal.         Behavior: Behavior is cooperative.       Results:   Lab Results   Component Value Date    WBC 11.0 2025    HGB 10.2 (L) 2025    HCT 31.1 (L) 2025    .0 2025     CREATSERUM 0.86 07/08/2025    BUN 25 (H) 07/08/2025     07/08/2025    K 3.9 07/08/2025     07/08/2025    CO2 26.0 07/08/2025     (H) 07/08/2025    CA 10.2 07/08/2025    ALB 2.6 (L) 07/08/2025    ALKPHO 410 (H) 07/08/2025    BILT 0.3 07/08/2025    TP 7.4 07/08/2025    AST 17 07/08/2025    ALT 44 07/08/2025    T4F 1.0 05/12/2025    TSH 2.119 05/20/2025    LIP 19 07/06/2025    PSA 0.8 04/17/2017    MG 1.5 (L) 07/08/2025    PHOS 4.2 07/06/2025       CT ABDOMEN+PELVIS(CPT=74176)  Result Date: 7/7/2025  CONCLUSION: 1.  Moderate splenomegaly. 2.  Mild nonspecific lymphadenopathy in the gastrohepatic ligament, and distal iliac chain bilaterally. 3.  Please see chest CT for differential. Electronically Verified and Signed by Attending Radiologist: Atif Daigle MD 7/7/2025 11:51 PM Workstation: [id number]    CT CHEST (CPT=71250)  Result Date: 7/7/2025  CONCLUSION: 1.  Marked mediastinal lymphadenopathy. Coexistent splenomegaly and gastrohepatic ligament lymphadenopathy. Differential:lymphoma, metastases, and less likely sarcoid or benign etiology. 2.  A 16 mm ill-defined anterior right upper lobe pulmonary nodule-differential includes primary malignancy, metastatic disease, and less likely sarcoid or infection. 3.  Right greater than left basilar pleural effusion with mild pulmonary edema. 4.  Multifocal peripheral consolidations with nodularity differential includes atypical infectious process, and malignancy. Electronically Verified and Signed by Attending Radiologist: Atif Daigle MD 7/7/2025 3:51 PM Workstation: [id number]    US ABDOMEN COMPLETE (CPT=76700)  Result Date: 7/7/2025  CONCLUSION: Borderline hepatomegaly with steatosis. Gallbladder adenomyomatosis. Small gallstone. No gallbladder wall thickening. Nonspecific pericholecystic fluid. Negative sonographic Lopez sign. Splenomegaly. Electronically Verified and Signed by Attending Radiologist: Danilo Neumann MD 7/7/2025 9:45 AM  Workstation: [id number]    XR CHEST AP PORTABLE  (CPT=71045)  Result Date: 7/6/2025  CONCLUSION: Multifocal bilateral patchy opacities may represent pneumonia or edema. Electronically Verified and Signed by Attending Radiologist: Atif Daigle MD 7/6/2025 1:58 PM Workstation: [id number]    EKG  Result Date: 7/7/2025  Normal sinus rhythm Nonspecific T wave abnormality Abnormal ECG When compared with ECG of 19-AUG-2023 10:47, Nonspecific T wave abnormality now evident in Anterior-lateral leads Confirmed by BANDAR CHAUDHRY ([id number]) on 7/7/2025 8:00:31 AM      Assessment & Plan:   Multifocal pneumonia  CT chest with multifocal peripheral opacities  No leukocytosis  Legionella antigen negative  Blood cultures no growth x1 day  On IV Unasyn and azithromycin day #3  Plan:  -Continue IV Unasyn and azithromycin  -DuoNebs Q6 while awake    Right pleural effusion  Small on CT chest  Plan:  -Right US guided thoracentesis tomorrow afternoon    Lymphadenopathy and splenomegaly  H/o intermittent fever, chills, night sweats, rashes x2 months  CT chest with bulky mediastinal lymphadenopathy  CT A/P with splenomegaly, lymphadenopathy in gastrohepatic ligament and bilateral iliac chain  Plan:  -Plan for bronch with EBUS on Friday 7/11 at 7:30 AM  -Recommend oncology opinion    Acute hypoxemic respiratory failure  Due to above  On 2 L O2 (no O2 at baseline)  Plan:  -O2 and wean as tolerated    CAROL  Resolved with IVF  Plan:  -On IVF    Hypercalcemia  Felt to be due to accidental vitamin D toxicity  Improved after IVF  Plan:  -Endocrinology following    DVT prophylaxis: Subcutaneous heparin    D/w pulmonary Dr. Johnson, hospitalist Dr. Mireles, and patient/family.    Epifanio Lazcano PA-C  Pulmonary Medicine  7/8/2025

## 2025-07-09 ENCOUNTER — APPOINTMENT (OUTPATIENT)
Dept: ULTRASOUND IMAGING | Facility: HOSPITAL | Age: 65
End: 2025-07-09
Attending: INTERNAL MEDICINE
Payer: COMMERCIAL

## 2025-07-09 LAB
ALBUMIN SERPL-MCNC: 2.6 G/DL (ref 3.2–4.8)
ALBUMIN/GLOB SERPL: 0.6 {RATIO} (ref 1–2)
ALP LIVER SERPL-CCNC: 336 U/L (ref 45–117)
ALT SERPL-CCNC: 52 U/L (ref 10–49)
ANION GAP SERPL CALC-SCNC: 6 MMOL/L (ref 0–18)
AST SERPL-CCNC: 39 U/L (ref ?–34)
BILIRUB SERPL-MCNC: 0.3 MG/DL (ref 0.2–1.1)
BUN BLD-MCNC: 22 MG/DL (ref 9–23)
BUN/CREAT SERPL: 28.2 (ref 10–20)
C DIFF TOX B STL QL: NEGATIVE
CALCIUM BLD-MCNC: 9.9 MG/DL (ref 8.7–10.4)
CHLORIDE SERPL-SCNC: 110 MMOL/L (ref 98–112)
CO2 SERPL-SCNC: 28 MMOL/L (ref 21–32)
CREAT BLD-MCNC: 0.78 MG/DL (ref 0.7–1.3)
DEPRECATED RDW RBC AUTO: 51.8 FL (ref 35.1–46.3)
EGFRCR SERPLBLD CKD-EPI 2021: 100 ML/MIN/1.73M2 (ref 60–?)
ERYTHROCYTE [DISTWIDTH] IN BLOOD BY AUTOMATED COUNT: 15.6 % (ref 11–15)
GLOBULIN PLAS-MCNC: 4.7 G/DL (ref 2–3.5)
GLUCOSE BLD-MCNC: 107 MG/DL (ref 70–99)
HCT VFR BLD AUTO: 31.3 % (ref 39–53)
HGB BLD-MCNC: 9.9 G/DL (ref 13–17.5)
MAGNESIUM SERPL-MCNC: 1.6 MG/DL (ref 1.6–2.6)
MCH RBC QN AUTO: 28.8 PG (ref 26–34)
MCHC RBC AUTO-ENTMCNC: 31.6 G/DL (ref 31–37)
MCV RBC AUTO: 91 FL (ref 80–100)
OSMOLALITY SERPL CALC.SUM OF ELEC: 302 MOSM/KG (ref 275–295)
PHOSPHATE SERPL-MCNC: 2.8 MG/DL (ref 2.4–5.1)
PLATELET # BLD AUTO: 226 10(3)UL (ref 150–450)
POTASSIUM SERPL-SCNC: 3.8 MMOL/L (ref 3.5–5.1)
PROT SERPL-MCNC: 7.3 G/DL (ref 5.7–8.2)
RBC # BLD AUTO: 3.44 X10(6)UL (ref 4.3–5.7)
SODIUM SERPL-SCNC: 144 MMOL/L (ref 136–145)
VIT D 1,25 DI-OH: 190 PG/ML
WBC # BLD AUTO: 8.8 X10(3) UL (ref 4–11)

## 2025-07-09 PROCEDURE — 76604 US EXAM CHEST: CPT | Performed by: STUDENT IN AN ORGANIZED HEALTH CARE EDUCATION/TRAINING PROGRAM

## 2025-07-09 PROCEDURE — 99233 SBSQ HOSP IP/OBS HIGH 50: CPT | Performed by: INTERNAL MEDICINE

## 2025-07-09 RX ORDER — MAGNESIUM OXIDE 400 MG/1
400 TABLET ORAL ONCE
Status: COMPLETED | OUTPATIENT
Start: 2025-07-09 | End: 2025-07-09

## 2025-07-09 RX ORDER — POTASSIUM CHLORIDE 1500 MG/1
40 TABLET, EXTENDED RELEASE ORAL ONCE
Status: COMPLETED | OUTPATIENT
Start: 2025-07-09 | End: 2025-07-09

## 2025-07-09 NOTE — PROGRESS NOTES
Pulmonary Medicine Inpatient Progress Note             Consult requested by Dr. Mireles for PNA.        Subjective:  Feels ok  Remains on 1-2 LNC      From the initial consultation  HPI: 65 yo male with hx of recent allergic reaction requiring steroids, HTN now admitted with coughing, shortness of breath, and fevers x 3 days.   No sick contacts.   Denies previous hx of pulmonary problems. No hx of asthma, COPD, recurrent PNAs  CXR concerning for PNA  Started on abx-unasyn/azithro along with nebs and solumedrol  Pt is afebrile on 4 LNC supp 02      REVIEW OF SYSTEMS:  Positives and negatives as stated in HPI. Remainder of 12 pt review of systems otherwise are negative.     PAST MEDICAL HISTORY:  Past Medical History[1]     PAST SURGICAL HISTORY:  Past Surgical History[2]     PAST FAMILY HISTORY:  Family History[3]     PAST SOCIAL HISTORY:  Social Hx on file[4]  Quit smoking 18 yrs. 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 No OP exudates  NECK/THROAT: no JVD. Trachea midline. No obvious thyromegaly  LUNG: diminished BS with no wheezing, crackles. + mild dim right base. Chest symmetric with respiratory motion  HEART: regular rate and rhythm, no obvious murmers or gallops note  ABD: soft non tender. + bowel sounds. No organomegaly noted  EXT: no clubbing, cyanosis, or edema note       IMAGES:  Chest CT scan 7/7/25  CONCLUSION  1.  Marked mediastinal  lymphadenopathy. Coexistent splenomegaly and gastrohepatic ligament lymphadenopathy. Differential:lymphoma, metastases, and less likely sarcoid or benign etiology.   2.  A 16 mm ill-defined anterior right upper lobe pulmonary nodule-differential includes primary malignancy, metastatic disease, and less likely sarcoid or infection.   3.  Right greater than left basilar pleural effusion with mild pulmonary edema.   4.  Multifocal peripheral consolidations with nodularity differential includes atypical infectious process, and malignancy.     CXR 7/6/25  CONCLUSION: Multifocal bilateral patchy opacities may represent pneumonia or edema.       LABS:  Recent Labs   Lab 07/06/25  1245 07/07/25  0652 07/08/25  0651 07/09/25  0618   RBC 4.54 4.17* 3.50* 3.44*   HGB 13.0 12.0* 10.2* 9.9*   HCT 40.0 36.6* 31.1* 31.3*   MCV 88.1 87.8 88.9 91.0   MCH 28.6 28.8 29.1 28.8   MCHC 32.5 32.8 32.8 31.6   RDW 15.0 15.2* 15.3* 15.6*   NEPRELIM 6.47 7.32  --   --    WBC 10.1 10.6 11.0 8.8   .0 215.0 227.0 226.0       Recent Labs   Lab 07/07/25  0652 07/07/25  1532 07/07/25  2119 07/08/25  0651 07/09/25  0618   *  --   --  140* 107*   BUN 28*  --   --  25* 22   CREATSERUM 1.14  --   --  0.86 0.78   EGFRCR 72  --   --  97 100   CA 10.6* 11.1* 10.1 10.2 9.9   ALB 2.7* 2.8*  --  2.6* 2.6*     --   --  142 144   K 4.1  --   --  3.9 3.8     --   --  108 110   CO2 23.0  --   --  26.0 28.0   ALKPHO 521*  --   --  410* 336*   AST 41*  --   --  17 39*   ALT 70*  --   --  44 52*   BILT 0.5  --   --  0.3 0.3   TP 7.9  --   --  7.4 7.3       ASSESSMENT/PLAN:  Multifocal PNA  -CXR with b/l infiltrates  -started on empiric unasyn/azithro  -sputum cx ordered  -urine leg neg   -cont supp 02-currently on 1-2 LNC  -nebs  -can continue short course solumedrol  -chest CT concerning for pulmonary infiltrates, lung nodule, and bulky mediastinal lymphadenopathy. There is a small right pleural effusion  -attempted bedside US guided  thoracentesis but pleural effusion is small with lung in the middle of the way obstructing ability to safely perform thoracentesis  -plan for bronchoscopy with EBUS on     Recent allergic reaction  -s/p tx with steroids last month  -previously saw allergy and ENT    Proph  -DVT: hep subcutaneous-hold Friday am dose    Dispo  -full code  -updated wife at bedside     Thank you for the opportunity to care for Karel Barragan       LIBRADO Johnson DO, MPH  Pulmonary Critical Care Medicine  formerly [facility] Pulmonary [facility] Critical Care Medicine            [1]   Past Medical History:  Diagnosis Date    Essential hypertension 2023    Hyperlipidemia    [2] History reviewed. No pertinent surgical history.  [3]   Family History  Problem Relation Age of Onset    Lung Disorder Mother     Heart Disease Father    [4]   Social History  Socioeconomic History    Marital status:    Tobacco Use    Smoking status: Former     Current packs/day: 0.00     Types: Cigarettes     Quit date: 2008     Years since quittin.5     Passive exposure: Past    Smokeless tobacco: Former   Vaping Use    Vaping status: Never Used   Substance and Sexual Activity    Alcohol use: Yes     Alcohol/week: 3.0 standard drinks of alcohol     Types: 3 Cans of beer per week     Comment: maybe 3 beers a day    Drug use: Never   [5]   Allergies  Allergen Reactions    Citrated Pterylmonoglutamic Acid [Glutamic Acid] UNKNOWN    Dander UNKNOWN    [place] UNKNOWN    Ragweed UNKNOWN    [place] UNKNOWN    Lisinopril RASH   [6]   Outpatient Medications Marked as Taking for the 25 encounter (Hospital Encounter)   Medication Sig Dispense Refill    diphenhydrAMINE (BANOPHEN) 25 MG Oral Cap Take 1 capsule (25 mg total) by mouth every 6 (six) hours as needed for Itching or Allergies.      [] triamcinolone 0.5 % External Cream Apply 1 Application topically 2 (two) times daily for 10 days. Apply twice daily for 7-10 days.  Do NOT use on face or in folds of skin. 15 g 3    amLODIPine 10 MG Oral Tab Take 1 tablet (10 mg total) by mouth daily. 90 tablet 3    rosuvastatin 10 MG Oral Tab Take 1 tablet (10 mg total) by mouth nightly. FOR CHOLESTEROL. 90 tablet 3    EPINEPHrine (EPIPEN 2-ROSENDA) 0.3 MG/0.3ML Injection Solution Auto-injector Inject IM in event of  allergic reaction 1 each 0    levocetirizine 5 MG Oral Tab Take 1 tablet (5 mg total) by mouth in the morning and 1 tablet (5 mg total) before bedtime. 180 tablet 1    albuterol (VENTOLIN HFA) 108 (90 Base) MCG/ACT Inhalation Aero Soln Inhale 2 puffs into the lungs every 4 (four) hours as needed for Wheezing. 1 each 0   [7]    predniSONE  60 mg Oral Daily with breakfast    [Held by provider] heparin  5,000 Units Subcutaneous 2 times per day    azithromycin  500 mg Oral Daily    ampicillin-sulbactam  3 g Intravenous q6h    ipratropium-albuterol  3 mL Nebulization Q6H WA   [8]    sodium chloride 125 mL/hr at 07/08/25 0628   [9]   benzonatate    guaiFENesin    sodium chloride    ipratropium-albuterol    benzocaine-menthol

## 2025-07-09 NOTE — PAYOR COMM NOTE
--------------  CONTINUED STAY REVIEW--REQUESTING ADDITIONAL DAY 7/8     Payor: JEVON FAITH  Subscriber #:  [id number]  Authorization Number: [id number]    Admit date: 7/6/25  Admit time:  4:09 PM    REVIEW DOCUMENTATION:          Date of Service: 7/8/2025  2:37 PM     Signed            [facility]  part of [facility]     Progress Note        Chief Complaint: pneumonia     SUBJECTIVE:     No acute events overnight.  Patient denies chest pain, sob.  No fevers/chills.  No n/v/abd pain.  Feels a little better today.      10 ROS completed and otherwise negative.     OBJECTIVE:  Vital signs in last 24 hours:  /64 (BP Location: Right arm)   Pulse 82   Temp 97.5 °F (36.4 °C) (Oral)   Resp 20   Wt 186 lb (84.4 kg)   SpO2 93%   BMI 30.95 kg/m²      Intake/Output:     Intake/Output Summary (Last 24 hours) at 7/8/2025 1437  Last data filed at 7/8/2025 0554      Gross per 24 hour   Intake 3144.59 ml   Output --   Net 3144.59 ml             Wt Readings from Last 3 Encounters:   07/06/25 186 lb (84.4 kg)   06/30/25 190 lb (86.2 kg)   06/25/25 189 lb (85.7 kg)         Exam      Gen: more comfortable today  Pulm: coarse bs, moving air  CV: Heart with regular rate and rhythm  Abd: Abdomen soft, nontender, bowel sounds present  Neuro: No acute focal deficits  MSK: moves extremities  Skin: Warm and dry  Psych: Normal affect  Ext: no c/c/e     Data Review:      Labs:         Lab Results   Component Value Date     WBC 11.0 07/08/2025     HGB 10.2 07/08/2025     HCT 31.1 07/08/2025     .0 07/08/2025     CREATSERUM 0.86 07/08/2025     BUN 25 07/08/2025      07/08/2025     K 3.9 07/08/2025      07/08/2025     CO2 26.0 07/08/2025      07/08/2025     CA 10.2 07/08/2025     ALB 2.6 07/08/2025     ALKPHO 410 07/08/2025     BILT 0.3 07/08/2025     TP 7.4 07/08/2025     AST 17 07/08/2025     ALT 44 07/08/2025     MG 1.5 07/08/2025         Imaging: reviewed     CT  ABDOMEN+PELVIS(CPT=74176)  Result Date: 7/7/2025  CONCLUSION: 1.  Moderate splenomegaly. 2.  Mild nonspecific lymphadenopathy in the gastrohepatic ligament, and distal iliac chain bilaterally. 3.  Please see chest CT for differential. Electronically Verified and Signed by Attending Radiologist: Atif Daigle MD 7/7/2025 11:51 PM Workstation: [id number]     CT CHEST (CPT=71250)  Result Date: 7/7/2025  CONCLUSION: 1.  Marked mediastinal lymphadenopathy. Coexistent splenomegaly and gastrohepatic ligament lymphadenopathy. Differential:lymphoma, metastases, and less likely sarcoid or benign etiology. 2.  A 16 mm ill-defined anterior right upper lobe pulmonary nodule-differential includes primary malignancy, metastatic disease, and less likely sarcoid or infection. 3.  Right greater than left basilar pleural effusion with mild pulmonary edema. 4.  Multifocal peripheral consolidations with nodularity differential includes atypical infectious process, and malignancy. Electronically Verified and Signed by Attending Radiologist: Atif Daigle MD 7/7/2025 3:51 PM Workstation: [id number]     US ABDOMEN COMPLETE (CPT=76700)  Result Date: 7/7/2025  CONCLUSION: Borderline hepatomegaly with steatosis. Gallbladder adenomyomatosis. Small gallstone. No gallbladder wall thickening. Nonspecific pericholecystic fluid. Negative sonographic Lopez sign. Splenomegaly. Electronically Verified and Signed by Attending Radiologist: Danilo Neumann MD 7/7/2025 9:45 AM Workstation: [id number]     XR CHEST AP PORTABLE  (CPT=71045)  Result Date: 7/6/2025  CONCLUSION: Multifocal bilateral patchy opacities may represent pneumonia or edema. Electronically Verified and Signed by Attending Radiologist: Atif Daigle MD 7/6/2025 1:58 PM Workstation: [id number]        Meds:   [Current Hospital Medications]    [Current Hospital Medications]         Current Facility-Administered Medications   Medication Dose Route Frequency    [START ON  7/9/2025] predniSONE (Deltasone) tab 60 mg  60 mg Oral Daily with breakfast    sodium chloride 0.9% infusion   Intravenous Continuous    heparin (Porcine) 5000 UNIT/ML injection 5,000 Units  5,000 Units Subcutaneous 2 times per day    azithromycin (Zithromax) tab 500 mg  500 mg Oral Daily    ampicillin-sulbactam (Unasyn) 3 g in sodium chloride 0.9% 100mL IVPB-ELKE  3 g Intravenous q6h    benzonatate (Tessalon) cap 200 mg  200 mg Oral TID PRN    guaiFENesin (Robitussin) 100 MG/5 ML oral liquid 200 mg  200 mg Oral Q4H PRN    sodium chloride (Saline Mist) 0.65 % nasal solution 1 spray  1 spray Each Nare Q3H PRN    ipratropium-albuterol (Duoneb) 0.5-2.5 (3) MG/3ML inhalation solution 3 mL  3 mL Nebulization Q6H PRN    ipratropium-albuterol (Duoneb) 0.5-2.5 (3) MG/3ML inhalation solution 3 mL  3 mL Nebulization Q6H WA    benzocaine-menthol (Cepacol) lozenge 1 lozenge  1 lozenge Oral PRN           Assessment  [Problem List]    [Problem List]  Patient Active Problem List      Diagnosis    Essential hypertension    Hyperlipidemia    Multifocal pneumonia    Acute respiratory failure with hypoxia (HCC)    CAROL (acute kidney injury)    Hypercalcemia        Plan:      Multifocal pneumonia, possible gram neg pneumonia  - started broad spectrum IV abx  - await urine legionella - neg  - sputum and blood cxs pending  - pulm consulted  - ct chest reviewed - discussed with pulm - plan thoracentesis tomorrow for rt pleural effusion  - plan bronch with EBUS on Friday  - heme/onc consult for concern for malignancy     Possible acute copd exacerbation  - pt has a smoking history, not currently tobacco user  - started nebs and steroids  - abx as above  - pulm on consult     Hypercalcemia  - started IVFs  - recheck in am - improving but still elevated, endo consulted  - pth low, concern for malignancy induced hypercalcemia - will check ct a/p and tumor markers - reviewed  - heme/onc consulted     Acute kidney injury   - possibly prerenal?  -  started IVFs  - recheck in am - improved     Acute transaminitis  - check abd us - reviewed - showing hepatic steatosis and hepatomegaly; also shows GB adenomyomatosis - will need close outpt follow up   - trend lfts  - pt used to be an alcoholic but previous lfts were wnl - trending down now     DM  - accuchecks,  ISS     HTN  - bp stable without meds, monitor for now     HL  - hold statin given elevated lfts         Global A/P  - reviewed labs and vitals from today  - reviewed notes of the day  - cbc, bmp, mag ordered for tomorrow  - discussed need to stay in the hospital today due to above  - cont IV abx  - discussed with patient/RN and care team  - dispo pending clinical course              Supplementary Documentation:   DVT Mechanical Prophylaxis:   SCDs,        DVT Pharmacologic Prophylaxis   Medication    heparin (Porcine) 5000 UNIT/ML injection 5,000 Units                                 MEDICATIONS ADMINISTERED IN LAST 1 DAY:  ampicillin-sulbactam (Unasyn) 3 g in sodium chloride 0.9% 100mL IVPB-ELKE       Date Action Dose Route User    7/9/2025 0443 New Bag 3 g Intravenous Goldberg, Rachel, RN    7/8/2025 2250 New Bag 3 g Intravenous Goldberg, Rachel, RN    7/8/2025 1706 New Bag 3 g Intravenous Albertina Perez RN          azithromycin (Zithromax) tab 500 mg       Date Action Dose Route User    7/9/2025 0923 Given 500 mg Oral Marlee Noland RN          heparin (Porcine) 5000 UNIT/ML injection 5,000 Units       Date Action Dose Route User    7/8/2025 2037 Given 5,000 Units Subcutaneous (Right Upper Arm) Goldberg, Rachel, RN    7/8/2025 1116 Given 5,000 Units Subcutaneous (Right Upper Arm) Albertina Perez RN          ipratropium-albuterol (Duoneb) 0.5-2.5 (3) MG/3ML inhalation solution 3 mL       Date Action Dose Route User    7/9/2025 0807 Given 3 mL Nebulization Tao You RCP    7/8/2025 2125 Given 3 mL Nebulization Cris Rachel    7/8/2025 1337 Given 3 mL Nebulization Swetha Washington, RCP           magnesium oxide (Mag-Ox) tab 400 mg       Date Action Dose Route User    7/9/2025 0923 Given 400 mg Oral Marele Noland, PINEDA          potassium chloride (Klor-Con M20) tab 40 mEq       Date Action Dose Route User    7/9/2025 0923 Given 40 mEq Oral Marlee Noland, PINEDA          predniSONE (Deltasone) tab 60 mg       Date Action Dose Route User    7/9/2025 0923 Given 60 mg Oral Marlee Noland, RN            Vitals (last day)       Date/Time Temp Pulse Resp BP SpO2 Weight O2 Device O2 Flow Rate (L/min) [facility]    07/09/25 0924 -- -- -- -- 94 % -- Nasal cannula 1 L/min     07/09/25 0922 -- -- -- -- 96 % -- Nasal cannula 1 L/min     07/09/25 0808 -- -- -- -- -- -- Nasal cannula 2 L/min     07/09/25 0441 98.1 °F (36.7 °C) 57 18 123/75 95 % -- Nasal cannula 2 L/min     07/08/25 2000 97.5 °F (36.4 °C) 64 20 120/64 95 % -- Nasal cannula 2 L/min     07/08/25 1154 97.5 °F (36.4 °C) 82 20 120/64 93 % -- Nasal cannula 2 L/min     07/08/25 0606 -- 55 -- -- -- -- -- --     07/08/25 0603 -- 51 -- -- -- -- -- --     07/08/25 0400 -- 57 -- -- -- -- -- --     07/08/25 0330 97.8 °F (36.6 °C) 82 24 123/73 92 % -- Nasal cannula 2 L/min     07/08/25 0105 -- 77 -- -- -- -- -- -- GT

## 2025-07-09 NOTE — PLAN OF CARE
Patient is AxO4,  no acute events overnight. On 1-2L of O2 via nasal cannula, weaning as tolerated, increased O2 requirements when sleeping. Denies pain. Receiving abx and nebs.Tolerating diet. Denies n/v. Tele in place. Voiding freely. Ambulating with standby/assist.  Plan for thoracentesis today and bronch with EBUS on Friday. Wife at bedside, updated on plan of care. Appropriate safety measures maintained.     Problem: Patient Centered Care  Goal: Patient preferences are identified and integrated in the patient's plan of care  Description: Interventions:  - What would you like us to know as we care for you?   - Provide timely, complete, and accurate information to patient/family  - Incorporate patient and family knowledge, values, beliefs, and cultural backgrounds into the planning and delivery of care  - Encourage patient/family to participate in care and decision-making at the level they choose  - Honor patient and family perspectives and choices  Outcome: Progressing     Problem: Patient/Family Goals  Goal: Patient/Family Long Term Goal  Description: Patient's Long Term Goal:     Interventions:    - See additional Care Plan goals for specific interventions  Outcome: Progressing  Goal: Patient/Family Short Term Goal  Description: Patient's Short Term Goal:     Interventions:   -   - See additional Care Plan goals for specific interventions  Outcome: Progressing     Problem: RISK FOR INFECTION - ADULT  Goal: Absence of fever/infection during anticipated neutropenic period  Description: INTERVENTIONS  - Monitor WBC  - Administer growth factors as ordered  - Implement neutropenic guidelines  Outcome: Progressing     Problem: SAFETY ADULT - FALL  Goal: Free from fall injury  Description: INTERVENTIONS:  - Assess pt frequently for physical needs  - Identify cognitive and physical deficits and behaviors that affect risk of falls.  - Jenera fall precautions as indicated by assessment.  - Educate pt/family on  patient safety including physical limitations  - Instruct pt to call for assistance with activity based on assessment  - Modify environment to reduce risk of injury  - Provide assistive devices as appropriate  - Consider OT/PT consult to assist with strengthening/mobility  - Encourage toileting schedule  Outcome: Progressing     Problem: RESPIRATORY - ADULT  Goal: Achieves optimal ventilation and oxygenation  Description: INTERVENTIONS:  - Assess for changes in respiratory status  - Assess for changes in mentation and behavior  - Position to facilitate oxygenation and minimize respiratory effort  - Oxygen supplementation based on oxygen saturation or ABGs  - Provide Smoking Cessation handout, if applicable  - Encourage broncho-pulmonary hygiene including cough, deep breathe, Incentive Spirometry  - Assess the need for suctioning and perform as needed  - Assess and instruct to report SOB or any respiratory difficulty  - Respiratory Therapy support as indicated  - Manage/alleviate anxiety  - Monitor for signs/symptoms of CO2 retention  Outcome: Progressing

## 2025-07-09 NOTE — PROGRESS NOTES
[facility]  part of [facility]    Progress Note    Karel Ford Patient Status:  Inpatient    10/6/1960 MRN [id number]   Location [facility] 4W/SW/SE Attending Tawana Mireles MD   Hosp Day # 3 PCP Martin Adams MD     Chief Complaint: pneumonia    SUBJECTIVE:    Resting comfortably in bed. No acute events overnight.    Waiting for his procedure later today.     10 ROS completed and otherwise negative.    OBJECTIVE:  Vital signs in last 24 hours:  /75 (BP Location: Right arm)   Pulse 57   Temp 98.1 °F (36.7 °C) (Oral)   Resp 18   Wt 186 lb (84.4 kg)   SpO2 94%   BMI 30.95 kg/m²     Intake/Output:    Intake/Output Summary (Last 24 hours) at 2025 1134  Last data filed at 2025 0443  Gross per 24 hour   Intake 440 ml   Output --   Net 440 ml       Wt Readings from Last 3 Encounters:   25 186 lb (84.4 kg)   25 190 lb (86.2 kg)   25 189 lb (85.7 kg)       Exam     Gen: NAD, AO  Pulm: Good inspiratory effort, coarse breath sounds bilaterally  CV: Heart with regular rate and rhythm  Abd: Abdomen soft, nontender, bowel sounds present  Neuro: No acute focal deficits  MSK: moves extremities  Skin: Warm and dry  Psych: Normal affect  Ext: no c/c/e    Data Review:     Labs:   Lab Results   Component Value Date    WBC 8.8 2025    HGB 9.9 2025    HCT 31.3 2025    .0 2025    CREATSERUM 0.78 2025    BUN 22 2025     2025    K 3.8 2025     2025    CO2 28.0 2025     2025    CA 9.9 2025    ALB 2.6 2025    ALKPHO 336 2025    BILT 0.3 2025    TP 7.3 2025    AST 39 2025    ALT 52 2025    MG 1.6 2025    PHOS 2.8 2025       Imaging: reviewed    CT ABDOMEN+PELVIS(CPT=74176)  Result Date: 2025  CONCLUSION: 1.  Moderate splenomegaly. 2.  Mild nonspecific lymphadenopathy in the gastrohepatic ligament, and distal iliac chain  bilaterally. 3.  Please see chest CT for differential. Electronically Verified and Signed by Attending Radiologist: Atif Daigle MD 7/7/2025 11:51 PM Workstation: [id number]    CT CHEST (CPT=71250)  Result Date: 7/7/2025  CONCLUSION: 1.  Marked mediastinal lymphadenopathy. Coexistent splenomegaly and gastrohepatic ligament lymphadenopathy. Differential:lymphoma, metastases, and less likely sarcoid or benign etiology. 2.  A 16 mm ill-defined anterior right upper lobe pulmonary nodule-differential includes primary malignancy, metastatic disease, and less likely sarcoid or infection. 3.  Right greater than left basilar pleural effusion with mild pulmonary edema. 4.  Multifocal peripheral consolidations with nodularity differential includes atypical infectious process, and malignancy. Electronically Verified and Signed by Attending Radiologist: Atif Daigle MD 7/7/2025 3:51 PM Workstation: [id number]    US ABDOMEN COMPLETE (CPT=76700)  Result Date: 7/7/2025  CONCLUSION: Borderline hepatomegaly with steatosis. Gallbladder adenomyomatosis. Small gallstone. No gallbladder wall thickening. Nonspecific pericholecystic fluid. Negative sonographic Lopez sign. Splenomegaly. Electronically Verified and Signed by Attending Radiologist: Danilo Neumann MD 7/7/2025 9:45 AM Workstation: [id number]    XR CHEST AP PORTABLE  (CPT=71045)  Result Date: 7/6/2025  CONCLUSION: Multifocal bilateral patchy opacities may represent pneumonia or edema. Electronically Verified and Signed by Attending Radiologist: Atif Daigle MD 7/6/2025 1:58 PM Workstation: [id number]      Meds:   Current Hospital Medications[1]      Assessment  Problem List[2]    Plan:     Multifocal pneumonia, possible gram neg pneumonia  Lymphadenopathy and splenomegaly  - imaging reviewed  - started broad spectrum IV abx  - await urine legionella - neg  - sputum culture contaminated  - blood cxs showing NGTD  - pulm consulted  - plan thoracentesis for  rt pleural effusion  - plan bronch with EBUS on Friday  - continue IV unasyn and azithromycin  - duonebs 16hrs  - heme/onc consult for concern for malignancy   - agree with above plan   - outpatient PET/CT if the above tests are nondiagnostic     AECOPD  - pt has a smoking history, not currently tobacco user  - started nebs and steroids  - pulm on consult   - management as above     Hypercalcemia 2/2 vit D toxicity  - started IVFs  - pth low, concern for malignancy induced hypercalcemia - will check ct a/p and tumor markers - heme/onc consulted  - endo consulted     Acute kidney injury   - possibly prerenal?  - started IVFs  - recheck in am - improved     Acute transaminitis  - check abd us - reviewed - showing hepatic steatosis and hepatomegaly; also shows GB adenomyomatosis - will need close outpt follow up   - trend lfts  - pt used to be an alcoholic but previous lfts were wnl - trending down now     DM  - accuchecks,  ISS     HTN  - bp stable without meds, monitor for now     HL  - hold statin given elevated lfts     Global A/P  - reviewed labs and vitals from today  - reviewed notes of the day  - cbc, bmp, mag ordered for tomorrow  - discussed need to stay in the hospital today due to above  - cont IV abx  - discussed with patient/RN and care team  - dispo pending clinical course          Supplementary Documentation:   DVT Mechanical Prophylaxis:   SCDs,    DVT Pharmacologic Prophylaxis   Medication    [Held by provider] heparin (Porcine) 5000 UNIT/ML injection 5,000 Units                Code Status: Not on file  [place]: No urinary catheter in place  [place] Duration (in days):   Central line:    MYLES: 7/10/2025                           MDM: High complexity-acute illness posing a threat to life. IV meds requiring close inpatient monitoring. I personally spent time on chart/note review, review of labs/imaging, discussion with patient, physical exam, discussion with staff, writing progress note, and discussion of  plan of care.           [1]   Current Facility-Administered Medications   Medication Dose Route Frequency    predniSONE (Deltasone) tab 60 mg  60 mg Oral Daily with breakfast    sodium chloride 0.9% infusion   Intravenous Continuous    [Held by provider] heparin (Porcine) 5000 UNIT/ML injection 5,000 Units  5,000 Units Subcutaneous 2 times per day    azithromycin (Zithromax) tab 500 mg  500 mg Oral Daily    ampicillin-sulbactam (Unasyn) 3 g in sodium chloride 0.9% 100mL IVPB-ELKE  3 g Intravenous q6h    benzonatate (Tessalon) cap 200 mg  200 mg Oral TID PRN    guaiFENesin (Robitussin) 100 MG/5 ML oral liquid 200 mg  200 mg Oral Q4H PRN    sodium chloride (Saline Mist) 0.65 % nasal solution 1 spray  1 spray Each Nare Q3H PRN    ipratropium-albuterol (Duoneb) 0.5-2.5 (3) MG/3ML inhalation solution 3 mL  3 mL Nebulization Q6H PRN    ipratropium-albuterol (Duoneb) 0.5-2.5 (3) MG/3ML inhalation solution 3 mL  3 mL Nebulization Q6H WA    benzocaine-menthol (Cepacol) lozenge 1 lozenge  1 lozenge Oral PRN   [2]   Patient Active Problem List  Diagnosis    Essential hypertension    Hyperlipidemia    Multifocal pneumonia    Acute respiratory failure with hypoxia (HCC)    CAROL (acute kidney injury)    Hypercalcemia    Mediastinal lymphadenopathy

## 2025-07-09 NOTE — PLAN OF CARE
Pt A/O x4, tolerating diet. Able to wean to room air. No calls from tele. Unable do to thora d/t small effusion. Mag & K+ replaced per protocol. Up ad [place], voiding freely. Schd nebs continued. IV & PO abx continued. Fall precautions in place. Call light within reach. Calls appropriately. Frequent rounding. Plan for bronch Friday AM. Family at bedside aware of care of plan.     Problem: Patient Centered Care  Goal: Patient preferences are identified and integrated in the patient's plan of care  Description: Interventions:  - What would you like us to know as we care for you?   - Provide timely, complete, and accurate information to patient/family  - Incorporate patient and family knowledge, values, beliefs, and cultural backgrounds into the planning and delivery of care  - Encourage patient/family to participate in care and decision-making at the level they choose  - Honor patient and family perspectives and choices  Outcome: Progressing     Problem: Patient/Family Goals  Goal: Patient/Family Long Term Goal  Description: Patient's Long Term Goal:     Interventions:  -   - See additional Care Plan goals for specific interventions  Outcome: Progressing  Goal: Patient/Family Short Term Goal  Description: Patient's Short Term Goal:     Interventions:   -   - See additional Care Plan goals for specific interventions  Outcome: Progressing     Problem: RISK FOR INFECTION - ADULT  Goal: Absence of fever/infection during anticipated neutropenic period  Description: INTERVENTIONS  - Monitor WBC  - Administer growth factors as ordered  - Implement neutropenic guidelines  Outcome: Progressing     Problem: SAFETY ADULT - FALL  Goal: Free from fall injury  Description: INTERVENTIONS:  - Assess pt frequently for physical needs  - Identify cognitive and physical deficits and behaviors that affect risk of falls.  - [place] fall precautions as indicated by assessment.  - Educate pt/family on patient safety including physical  limitations  - Instruct pt to call for assistance with activity based on assessment  - Modify environment to reduce risk of injury  - Provide assistive devices as appropriate  - Consider OT/PT consult to assist with strengthening/mobility  - Encourage toileting schedule  Outcome: Progressing     Problem: RESPIRATORY - ADULT  Goal: Achieves optimal ventilation and oxygenation  Description: INTERVENTIONS:  - Assess for changes in respiratory status  - Assess for changes in mentation and behavior  - Position to facilitate oxygenation and minimize respiratory effort  - Oxygen supplementation based on oxygen saturation or ABGs  - Provide Smoking Cessation handout, if applicable  - Encourage broncho-pulmonary hygiene including cough, deep breathe, Incentive Spirometry  - Assess the need for suctioning and perform as needed  - Assess and instruct to report SOB or any respiratory difficulty  - Respiratory Therapy support as indicated  - Manage/alleviate anxiety  - Monitor for signs/symptoms of CO2 retention  Outcome: Progressing     Problem: PAIN - ADULT  Goal: Verbalizes/displays adequate comfort level or patient's stated pain goal  Description: INTERVENTIONS:  - Encourage pt to monitor pain and request assistance  - Assess pain using appropriate pain scale  - Administer analgesics based on type and severity of pain and evaluate response  - Implement non-pharmacological measures as appropriate and evaluate response  - Consider cultural and social influences on pain and pain management  - Manage/alleviate anxiety  - Utilize distraction and/or relaxation techniques  - Monitor for opioid side effects  - Notify MD/LIP if interventions unsuccessful or patient reports new pain  - Anticipate increased pain with activity and pre-medicate as appropriate  Outcome: Progressing     Problem: DISCHARGE PLANNING  Goal: Discharge to home or other facility with appropriate resources  Description: INTERVENTIONS:  - Identify barriers to  discharge w/pt and caregiver  - Include patient/family/discharge partner in discharge planning  - Arrange for needed discharge resources and transportation as appropriate  - Identify discharge learning needs (meds, wound care, etc)  - Arrange for interpreters to assist at discharge as needed  - Consider post-discharge preferences of patient/family/discharge partner  - Complete POLST form as appropriate  - Assess patient's ability to be responsible for managing their own health  - Refer to Case Management Department for coordinating discharge planning if the patient needs post-hospital services based on physician/LIP order or complex needs related to functional status, cognitive ability or social support system  Outcome: Progressing     Problem: Altered Communication/Language Barrier  Goal: Patient/Family is able to understand and participate in their care  Description: Interventions:  - Assess communication ability and preferred communication style  - Implement communication aides and strategies  - Use visual cues when possible  - Listen attentively, be patient, do not interrupt  - Minimize distractions  - Allow time for understanding and response  - Establish method for patient to ask for assistance (call light)  - Provide an  as needed  - Communicate barriers and strategies to overcome with those who interact with patient  Outcome: Progressing     Problem: GASTROINTESTINAL - ADULT  Goal: Maintains or returns to baseline bowel function  Description: INTERVENTIONS:  - Assess bowel function  - Maintain adequate hydration with IV or PO as ordered and tolerated  - Evaluate effectiveness of GI medications  - Encourage mobilization and activity  - Obtain nutritional consult as needed  - Establish a toileting routine/schedule  - Consider collaborating with pharmacy to review patient's medication profile  Outcome: Progressing

## 2025-07-09 NOTE — PROGRESS NOTES
Calcium continues to trend down  Cortisol 9.9 which is slightly low for hospitalization   No other symptoms of AI  Will recheck cortisol tmrw morning     Will follow    Farhana Hernandez

## 2025-07-10 LAB
ALBUMIN SERPL-MCNC: 2.7 G/DL (ref 3.2–4.8)
ALBUMIN/GLOB SERPL: 0.6 {RATIO} (ref 1–2)
ALP LIVER SERPL-CCNC: 297 U/L (ref 45–117)
ALT SERPL-CCNC: 81 U/L (ref 10–49)
ANION GAP SERPL CALC-SCNC: 5 MMOL/L (ref 0–18)
AST SERPL-CCNC: 45 U/L (ref ?–34)
BASOPHILS # BLD: 0 X10(3) UL (ref 0–0.2)
BASOPHILS NFR BLD: 0 %
BILIRUB SERPL-MCNC: 0.3 MG/DL (ref 0.2–1.1)
BUN BLD-MCNC: 17 MG/DL (ref 9–23)
BUN/CREAT SERPL: 23.9 (ref 10–20)
CALCIUM BLD-MCNC: 9.5 MG/DL (ref 8.7–10.4)
CHLORIDE SERPL-SCNC: 107 MMOL/L (ref 98–112)
CO2 SERPL-SCNC: 28 MMOL/L (ref 21–32)
CORTIS SERPL-MCNC: 5.4 UG/DL
CREAT BLD-MCNC: 0.71 MG/DL (ref 0.7–1.3)
DEPRECATED RDW RBC AUTO: 49.7 FL (ref 35.1–46.3)
EGFRCR SERPLBLD CKD-EPI 2021: 103 ML/MIN/1.73M2 (ref 60–?)
EOSINOPHIL # BLD: 0.09 X10(3) UL (ref 0–0.7)
EOSINOPHIL NFR BLD: 1 %
ERYTHROCYTE [DISTWIDTH] IN BLOOD BY AUTOMATED COUNT: 15.5 % (ref 11–15)
GLOBULIN PLAS-MCNC: 4.7 G/DL (ref 2–3.5)
GLUCOSE BLD-MCNC: 87 MG/DL (ref 70–99)
HCT VFR BLD AUTO: 32.3 % (ref 39–53)
HGB BLD-MCNC: 10.4 G/DL (ref 13–17.5)
LYMPHOCYTES NFR BLD: 1.46 X10(3) UL (ref 1–4)
LYMPHOCYTES NFR BLD: 16 %
MAGNESIUM SERPL-MCNC: 1.5 MG/DL (ref 1.6–2.6)
MCH RBC QN AUTO: 28.4 PG (ref 26–34)
MCHC RBC AUTO-ENTMCNC: 32.2 G/DL (ref 31–37)
MCV RBC AUTO: 88.3 FL (ref 80–100)
METAMYELOCYTES # BLD: 0.09 X10(3) UL (ref ?–0.01)
METAMYELOCYTES NFR BLD: 1 %
MONOCYTES # BLD: 0.43 X10(3) UL (ref 0.1–1)
MONOCYTES NFR BLD: 5 %
MORPHOLOGY: NORMAL
MYELOCYTES # BLD: 0.26 X10(3) UL (ref ?–0.01)
MYELOCYTES NFR BLD: 3 %
NEUTROPHILS # BLD AUTO: 4.59 X10 (3) UL (ref 1.5–7.7)
NEUTROPHILS NFR BLD: 70 %
NEUTS BAND NFR BLD: 3 %
NEUTS HYPERSEG # BLD: 6.28 X10(3) UL (ref 1.5–7.7)
OSMOLALITY SERPL CALC.SUM OF ELEC: 291 MOSM/KG (ref 275–295)
PHOSPHATE SERPL-MCNC: 3.1 MG/DL (ref 2.4–5.1)
PLATELET # BLD AUTO: 249 10(3)UL (ref 150–450)
PLATELET MORPHOLOGY: NORMAL
POTASSIUM SERPL-SCNC: 3.6 MMOL/L (ref 3.5–5.1)
POTASSIUM SERPL-SCNC: 3.6 MMOL/L (ref 3.5–5.1)
POTASSIUM SERPL-SCNC: 4.6 MMOL/L (ref 3.5–5.1)
PROT SERPL-MCNC: 7.4 G/DL (ref 5.7–8.2)
RBC # BLD AUTO: 3.66 X10(6)UL (ref 4.3–5.7)
SODIUM SERPL-SCNC: 140 MMOL/L (ref 136–145)
TOTAL CELLS COUNTED BLD: 100
VARIANT LYMPHS NFR BLD MANUAL: 1 % (ref ?–4)
WBC # BLD AUTO: 8.6 X10(3) UL (ref 4–11)

## 2025-07-10 PROCEDURE — 99233 SBSQ HOSP IP/OBS HIGH 50: CPT | Performed by: INTERNAL MEDICINE

## 2025-07-10 RX ORDER — POTASSIUM CHLORIDE 1500 MG/1
40 TABLET, EXTENDED RELEASE ORAL EVERY 4 HOURS
Status: COMPLETED | OUTPATIENT
Start: 2025-07-10 | End: 2025-07-10

## 2025-07-10 RX ORDER — MAGNESIUM OXIDE 400 MG/1
800 TABLET ORAL ONCE
Status: COMPLETED | OUTPATIENT
Start: 2025-07-10 | End: 2025-07-10

## 2025-07-10 NOTE — PROGRESS NOTES
[facility]  part of [facility]    Progress Note    Karel Ford Patient Status:  Inpatient    10/6/1960 MRN [id number]   Location [facility] 4W/SW/SE Attending Tawana Mireles MD   Hosp Day # 4 PCP Martin Adams MD     Chief Complaint: pneumonia    SUBJECTIVE:    Resting comfortably in bed.   Thoracentesis was canceled on 2025 with plans to forgo for the bronch tomorrow morning.   No overnight events reported.   Plan of care discussed with the family.     10 ROS completed and otherwise negative.    OBJECTIVE:  Vital signs in last 24 hours:  /75 (BP Location: Right arm)   Pulse 64   Temp 97.4 °F (36.3 °C) (Temporal)   Resp 18   Wt 186 lb (84.4 kg)   SpO2 96%   BMI 30.95 kg/m²     Intake/Output:    Intake/Output Summary (Last 24 hours) at 7/10/2025 1003  Last data filed at 2025 2203  Gross per 24 hour   Intake 1250 ml   Output --   Net 1250 ml       Wt Readings from Last 3 Encounters:   25 186 lb (84.4 kg)   25 190 lb (86.2 kg)   25 189 lb (85.7 kg)       Exam     Gen: NAD, AO  Pulm: Good inspiratory effort, coarse breath sounds bilaterally  CV: Heart with regular rate and rhythm  Abd: Abdomen soft, nontender, bowel sounds present  Neuro: No acute focal deficits  MSK: moves extremities  Skin: Warm and dry  Psych: Normal affect  Ext: no c/c/e    Data Review:     Labs:   Lab Results   Component Value Date    WBC 8.6 07/10/2025    HGB 10.4 07/10/2025    HCT 32.3 07/10/2025    .0 07/10/2025    CREATSERUM 0.71 07/10/2025    BUN 17 07/10/2025     07/10/2025    K 3.6 07/10/2025    K 3.6 07/10/2025     07/10/2025    CO2 28.0 07/10/2025    GLU 87 07/10/2025    CA 9.5 07/10/2025    ALB 2.7 07/10/2025    ALKPHO 297 07/10/2025    BILT 0.3 07/10/2025    TP 7.4 07/10/2025    AST 45 07/10/2025    ALT 81 07/10/2025    MG 1.5 07/10/2025    PHOS 3.1 07/10/2025       Imaging: reviewed    US CHEST (CPT=76604)  Result Date:  7/9/2025  CONCLUSION: Small volume pleural effusion. Thoracentesis was deferred. Electronically Verified and Signed by Attending Radiologist: Jeanmarie Barajas MD 7/9/2025 7:33 PM Workstation: [id number]    CT ABDOMEN+PELVIS(CPT=74176)  Result Date: 7/7/2025  CONCLUSION: 1.  Moderate splenomegaly. 2.  Mild nonspecific lymphadenopathy in the gastrohepatic ligament, and distal iliac chain bilaterally. 3.  Please see chest CT for differential. Electronically Verified and Signed by Attending Radiologist: Atif Daigle MD 7/7/2025 11:51 PM Workstation: [id number]    CT CHEST (CPT=71250)  Result Date: 7/7/2025  CONCLUSION: 1.  Marked mediastinal lymphadenopathy. Coexistent splenomegaly and gastrohepatic ligament lymphadenopathy. Differential:lymphoma, metastases, and less likely sarcoid or benign etiology. 2.  A 16 mm ill-defined anterior right upper lobe pulmonary nodule-differential includes primary malignancy, metastatic disease, and less likely sarcoid or infection. 3.  Right greater than left basilar pleural effusion with mild pulmonary edema. 4.  Multifocal peripheral consolidations with nodularity differential includes atypical infectious process, and malignancy. Electronically Verified and Signed by Attending Radiologist: Atif Daigle MD 7/7/2025 3:51 PM Workstation: [id number]      Meds:   Current Hospital Medications[1]      Assessment  Problem List[2]    Plan:     Multifocal pneumonia, possible gram neg pneumonia  Lymphadenopathy and splenomegaly  - imaging reviewed  - started broad spectrum IV abx  - await urine legionella - neg  - sputum culture contaminated  - blood cxs showing NGTD  - pulm consulted  - continue short course of solumedrol  - bedside thoracentesis not done due to safety concerns  - plan for bronchoscopy with EBUS on Friday - hold heparin s/c tomorrow morning  - continue IV unasyn and azithromycin  - continue duonebs  - Heme/onc consult for concern for malignancy   - agree with above plan   -  outpatient PET/CT if the above tests are nondiagnostic     AECOPD  - pt has a smoking history, not currently tobacco user  - started nebs and steroids  - pulm on consult   - continue steroids     Hypercalcemia 2/2 vit D toxicity  - IVF discontinued  - pth low, concern for malignancy induced hypercalcemia - will check ct a/p and tumor markers - heme/onc consulted  - endo consulted   -   - Ca 9.5 this morning     Acute kidney injury   - possibly prerenal?  - IVF discontinued  - renal function now at baseline, monitor labs     Acute transaminitis  - abd us reviewed - showing hepatic steatosis and hepatomegaly; also shows GB adenomyomatosis - will need close outpt follow up   - pt used to be an alcoholic but previous lfts were wnl   - ALP trending down  - AST, ALT uptrending  - monitor labs     DM  - accuchecks,  ISS     HTN  - bp stable without meds, monitor for now     HL  - hold statin given elevated lfts         Supplementary Documentation:   DVT Mechanical Prophylaxis:   SCDs,    DVT Pharmacologic Prophylaxis   Medication    heparin (Porcine) 5000 UNIT/ML injection 5,000 Units                Code Status: Not on file  [place]: No urinary catheter in place  [place] Duration (in days):   Central line:    MYLES: 7/12/2025                   MDM: High complexity-acute illness posing a threat to life. IV meds requiring close inpatient monitoring. I personally spent time on chart/note review, review of labs/imaging, discussion with patient, physical exam, discussion with staff, writing progress note, and discussion of plan of care.             [1]   Current Facility-Administered Medications   Medication Dose Route Frequency    potassium chloride (Klor-Con M20) tab 40 mEq  40 mEq Oral Q4H    predniSONE (Deltasone) tab 60 mg  60 mg Oral Daily with breakfast    heparin (Porcine) 5000 UNIT/ML injection 5,000 Units  5,000 Units Subcutaneous 2 times per day    ampicillin-sulbactam (Unasyn) 3 g in sodium chloride 0.9% 100mL IVPB-ELKE  3  g Intravenous q6h    benzonatate (Tessalon) cap 200 mg  200 mg Oral TID PRN    guaiFENesin (Robitussin) 100 MG/5 ML oral liquid 200 mg  200 mg Oral Q4H PRN    sodium chloride (Saline Mist) 0.65 % nasal solution 1 spray  1 spray Each Nare Q3H PRN    ipratropium-albuterol (Duoneb) 0.5-2.5 (3) MG/3ML inhalation solution 3 mL  3 mL Nebulization Q6H PRN    ipratropium-albuterol (Duoneb) 0.5-2.5 (3) MG/3ML inhalation solution 3 mL  3 mL Nebulization Q6H WA    benzocaine-menthol (Cepacol) lozenge 1 lozenge  1 lozenge Oral PRN   [2]   Patient Active Problem List  Diagnosis    Essential hypertension    Hyperlipidemia    Multifocal pneumonia    Acute respiratory failure with hypoxia (HCC)    CAROL (acute kidney injury)    Hypercalcemia    Mediastinal lymphadenopathy

## 2025-07-10 NOTE — PLAN OF CARE
Patient is alert and oriented x4. Tolerating cardiac diet. Up independently.  Family at bedside. Call light within reach.    Problem: Patient Centered Care  Goal: Patient preferences are identified and integrated in the patient's plan of care  Description: Interventions:  - What would you like us to know as we care for you?   - Provide timely, complete, and accurate information to patient/family  - Incorporate patient and family knowledge, values, beliefs, and cultural backgrounds into the planning and delivery of care  - Encourage patient/family to participate in care and decision-making at the level they choose  - Honor patient and family perspectives and choices  Outcome: Progressing     Problem: Patient/Family Goals  Goal: Patient/Family Long Term Goal  Description: Patient's Long Term Goal:     Interventions:  -   - See additional Care Plan goals for specific interventions  Outcome: Progressing  Goal: Patient/Family Short Term Goal  Description: Patient's Short Term Goal:     Interventions:   -   - See additional Care Plan goals for specific interventions  Outcome: Progressing     Problem: RISK FOR INFECTION - ADULT  Goal: Absence of fever/infection during anticipated neutropenic period  Description: INTERVENTIONS  - Monitor WBC  - Administer growth factors as ordered  - Implement neutropenic guidelines  Outcome: Progressing     Problem: SAFETY ADULT - FALL  Goal: Free from fall injury  Description: INTERVENTIONS:  - Assess pt frequently for physical needs  - Identify cognitive and physical deficits and behaviors that affect risk of falls.  - [place] fall precautions as indicated by assessment.  - Educate pt/family on patient safety including physical limitations  - Instruct pt to call for assistance with activity based on assessment  - Modify environment to reduce risk of injury  - Provide assistive devices as appropriate  - Consider OT/PT consult to assist with strengthening/mobility  - Encourage toileting  schedule  Outcome: Progressing     Problem: RESPIRATORY - ADULT  Goal: Achieves optimal ventilation and oxygenation  Description: INTERVENTIONS:  - Assess for changes in respiratory status  - Assess for changes in mentation and behavior  - Position to facilitate oxygenation and minimize respiratory effort  - Oxygen supplementation based on oxygen saturation or ABGs  - Provide Smoking Cessation handout, if applicable  - Encourage broncho-pulmonary hygiene including cough, deep breathe, Incentive Spirometry  - Assess the need for suctioning and perform as needed  - Assess and instruct to report SOB or any respiratory difficulty  - Respiratory Therapy support as indicated  - Manage/alleviate anxiety  - Monitor for signs/symptoms of CO2 retention  Outcome: Progressing     Problem: PAIN - ADULT  Goal: Verbalizes/displays adequate comfort level or patient's stated pain goal  Description: INTERVENTIONS:  - Encourage pt to monitor pain and request assistance  - Assess pain using appropriate pain scale  - Administer analgesics based on type and severity of pain and evaluate response  - Implement non-pharmacological measures as appropriate and evaluate response  - Consider cultural and social influences on pain and pain management  - Manage/alleviate anxiety  - Utilize distraction and/or relaxation techniques  - Monitor for opioid side effects  - Notify MD/LIP if interventions unsuccessful or patient reports new pain  - Anticipate increased pain with activity and pre-medicate as appropriate  Outcome: Progressing     Problem: DISCHARGE PLANNING  Goal: Discharge to home or other facility with appropriate resources  Description: INTERVENTIONS:  - Identify barriers to discharge w/pt and caregiver  - Include patient/family/discharge partner in discharge planning  - Arrange for needed discharge resources and transportation as appropriate  - Identify discharge learning needs (meds, wound care, etc)  - Arrange for interpreters to  assist at discharge as needed  - Consider post-discharge preferences of patient/family/discharge partner  - Complete POLST form as appropriate  - Assess patient's ability to be responsible for managing their own health  - Refer to Case Management Department for coordinating discharge planning if the patient needs post-hospital services based on physician/LIP order or complex needs related to functional status, cognitive ability or social support system  Outcome: Progressing     Problem: Altered Communication/Language Barrier  Goal: Patient/Family is able to understand and participate in their care  Description: Interventions:  - Assess communication ability and preferred communication style  - Implement communication aides and strategies  - Use visual cues when possible  - Listen attentively, be patient, do not interrupt  - Minimize distractions  - Allow time for understanding and response  - Establish method for patient to ask for assistance (call light)  - Provide an  as needed  - Communicate barriers and strategies to overcome with those who interact with patient  Outcome: Progressing     Problem: GASTROINTESTINAL - ADULT  Goal: Maintains or returns to baseline bowel function  Description: INTERVENTIONS:  - Assess bowel function  - Maintain adequate hydration with IV or PO as ordered and tolerated  - Evaluate effectiveness of GI medications  - Encourage mobilization and activity  - Obtain nutritional consult as needed  - Establish a toileting routine/schedule  - Consider collaborating with pharmacy to review patient's medication profile  Outcome: Progressing

## 2025-07-10 NOTE — PAYOR COMM NOTE
--------------  CONTINUED STAY REVIEW---REQUESTING ADDITIONAL DAY 7/10      Payor: JEVON FAITH  Subscriber #:  [id number]  Authorization Number: [id number]    Admit date: 7/6/25  Admit time:  4:09 PM    REVIEW DOCUMENTATION:          Date of Service: 7/10/2025 10:03 AM     Signed            [facility]  part of [facility]     Progress Note        Chief Complaint: pneumonia     SUBJECTIVE:     Resting comfortably in bed.   Thoracentesis was canceled on 07/07/2025 with plans to forgo for the bronch tomorrow morning.   No overnight events reported.   Plan of care discussed with the family.      10 ROS completed and otherwise negative.     OBJECTIVE:  Vital signs in last 24 hours:  /75 (BP Location: Right arm)   Pulse 64   Temp 97.4 °F (36.3 °C) (Temporal)   Resp 18   Wt 186 lb (84.4 kg)   SpO2 96%   BMI 30.95 kg/m²      Intake/Output:     Intake/Output Summary (Last 24 hours) at 7/10/2025 1003  Last data filed at 7/9/2025 2203      Gross per 24 hour   Intake 1250 ml   Output --   Net 1250 ml             Wt Readings from Last 3 Encounters:   07/06/25 186 lb (84.4 kg)   06/30/25 190 lb (86.2 kg)   06/25/25 189 lb (85.7 kg)         Exam      Gen: NAD, AO  Pulm: Good inspiratory effort, coarse breath sounds bilaterally  CV: Heart with regular rate and rhythm  Abd: Abdomen soft, nontender, bowel sounds present  Neuro: No acute focal deficits  MSK: moves extremities  Skin: Warm and dry  Psych: Normal affect  Ext: no c/c/e     Data Review:      Labs:         Lab Results   Component Value Date     WBC 8.6 07/10/2025     HGB 10.4 07/10/2025     HCT 32.3 07/10/2025     .0 07/10/2025     CREATSERUM 0.71 07/10/2025     BUN 17 07/10/2025      07/10/2025     K 3.6 07/10/2025     K 3.6 07/10/2025      07/10/2025     CO2 28.0 07/10/2025     GLU 87 07/10/2025     CA 9.5 07/10/2025     ALB 2.7 07/10/2025     ALKPHO 297 07/10/2025     BILT 0.3 07/10/2025     TP 7.4 07/10/2025     AST 45  07/10/2025     ALT 81 07/10/2025     MG 1.5 07/10/2025     PHOS 3.1 07/10/2025         Imaging: reviewed     US CHEST (CPT=76604)  Result Date: 7/9/2025  CONCLUSION: Small volume pleural effusion. Thoracentesis was deferred. Electronically Verified and Signed by Attending Radiologist: Jeanmarie Barajas MD 7/9/2025 7:33 PM Workstation: [id number]     CT ABDOMEN+PELVIS(CPT=74176)  Result Date: 7/7/2025  CONCLUSION: 1.  Moderate splenomegaly. 2.  Mild nonspecific lymphadenopathy in the gastrohepatic ligament, and distal iliac chain bilaterally. 3.  Please see chest CT for differential. Electronically Verified and Signed by Attending Radiologist: Atif Daigle MD 7/7/2025 11:51 PM Workstation: [id number]     CT CHEST (CPT=71250)  Result Date: 7/7/2025  CONCLUSION: 1.  Marked mediastinal lymphadenopathy. Coexistent splenomegaly and gastrohepatic ligament lymphadenopathy. Differential:lymphoma, metastases, and less likely sarcoid or benign etiology. 2.  A 16 mm ill-defined anterior right upper lobe pulmonary nodule-differential includes primary malignancy, metastatic disease, and less likely sarcoid or infection. 3.  Right greater than left basilar pleural effusion with mild pulmonary edema. 4.  Multifocal peripheral consolidations with nodularity differential includes atypical infectious process, and malignancy. Electronically Verified and Signed by Attending Radiologist: Atif Daigle MD 7/7/2025 3:51 PM Workstation: [id number]        Meds:   [Current Hospital Medications]    [Current Hospital Medications]         Current Facility-Administered Medications   Medication Dose Route Frequency    potassium chloride (Klor-Con M20) tab 40 mEq  40 mEq Oral Q4H    predniSONE (Deltasone) tab 60 mg  60 mg Oral Daily with breakfast    heparin (Porcine) 5000 UNIT/ML injection 5,000 Units  5,000 Units Subcutaneous 2 times per day    ampicillin-sulbactam (Unasyn) 3 g in sodium chloride 0.9% 100mL IVPB-ELKE  3 g Intravenous q6h     benzonatate (Tessalon) cap 200 mg  200 mg Oral TID PRN    guaiFENesin (Robitussin) 100 MG/5 ML oral liquid 200 mg  200 mg Oral Q4H PRN    sodium chloride (Saline Mist) 0.65 % nasal solution 1 spray  1 spray Each Nare Q3H PRN    ipratropium-albuterol (Duoneb) 0.5-2.5 (3) MG/3ML inhalation solution 3 mL  3 mL Nebulization Q6H PRN    ipratropium-albuterol (Duoneb) 0.5-2.5 (3) MG/3ML inhalation solution 3 mL  3 mL Nebulization Q6H WA    benzocaine-menthol (Cepacol) lozenge 1 lozenge  1 lozenge Oral PRN           Assessment  [Problem List]    [Problem List]  Patient Active Problem List      Diagnosis    Essential hypertension    Hyperlipidemia    Multifocal pneumonia    Acute respiratory failure with hypoxia (HCC)    CAROL (acute kidney injury)    Hypercalcemia    Mediastinal lymphadenopathy        Plan:      Multifocal pneumonia, possible gram neg pneumonia  Lymphadenopathy and splenomegaly  - imaging reviewed  - started broad spectrum IV abx  - await urine legionella - neg  - sputum culture contaminated  - blood cxs showing NGTD  - pulm consulted  - continue short course of solumedrol  - bedside thoracentesis not done due to safety concerns  - plan for bronchoscopy with EBUS on Friday - hold heparin s/c tomorrow morning  - continue IV unasyn and azithromycin  - continue duonebs  - Heme/onc consult for concern for malignancy                - agree with above plan                - outpatient PET/CT if the above tests are nondiagnostic     AECOPD  - pt has a smoking history, not currently tobacco user  - started nebs and steroids  - pulm on consult                - continue steroids     Hypercalcemia 2/2 vit D toxicity  - IVF discontinued  - pth low, concern for malignancy induced hypercalcemia - will check ct a/p and tumor markers - heme/onc consulted  - endo consulted                -   - Ca 9.5 this morning     Acute kidney injury   - possibly prerenal?  - IVF discontinued  - renal function now at baseline, monitor  labs     Acute transaminitis  - abd us reviewed - showing hepatic steatosis and hepatomegaly; also shows GB adenomyomatosis - will need close outpt follow up   - pt used to be an alcoholic but previous lfts were wnl   - ALP trending down  - AST, ALT uptrending  - monitor labs     DM  - accuchecks,  ISS     HTN  - bp stable without meds, monitor for now     HL  - hold statin given elevated lfts                       Supplementary Documentation:   DVT Mechanical Prophylaxis:   SCDs,        DVT Pharmacologic Prophylaxis   Medication    heparin (Porcine) 5000 UNIT/ML injection 5,000 Units                                  MEDICATIONS ADMINISTERED IN LAST 1 DAY:  ampicillin-sulbactam (Unasyn) 3 g in sodium chloride 0.9% 100mL IVPB-ELKE       Date Action Dose Route User    7/10/2025 1133 New Bag 3 g Intravenous Stevenson Walters RN    7/10/2025 0516 New Bag 3 g Intravenous Dianna Perez RN    7/9/2025 2203 New Bag 3 g Intravenous Dianna Perez RN    7/9/2025 1723 New Bag 3 g Intravenous Marlee Noland RN          azithromycin (Zithromax) tab 500 mg       Date Action Dose Route User    7/10/2025 0841 Given 500 mg Oral Stevenson Walters RN          heparin (Porcine) 5000 UNIT/ML injection 5,000 Units       Date Action Dose Route User    7/10/2025 0842 Given 5,000 Units Subcutaneous (Left Upper Arm) Stevenson Walters RN    7/9/2025 2159 Given 5,000 Units Subcutaneous (Right Upper Arm) Dianna Perez RN          ipratropium-albuterol (Duoneb) 0.5-2.5 (3) MG/3ML inhalation solution 3 mL       Date Action Dose Route User    7/10/2025 1240 Given 3 mL Nebulization Montse Mccord RCP    7/10/2025 0800 Given 3 mL Nebulization Montse Mccord RCP    7/9/2025 1935 Given 3 mL Nebulization Janina Johnson, ALVINA          magnesium oxide (Mag-Ox) tab 800 mg       Date Action Dose Route User    7/10/2025 0841 Given 800 mg Oral Stevenson Walters RN          potassium chloride (Klor-Con M20) tab 40 mEq       Date Action Dose Route  User    7/10/2025 1133 Given 40 mEq Oral Stevenson Walters RN    7/10/2025 0841 Given 40 mEq Oral Stevenson Walters RN          predniSONE (Deltasone) tab 60 mg       Date Action Dose Route User    7/10/2025 0841 Given 60 mg Oral Stevenson Walters RN            Vitals (last day)       Date/Time Temp Pulse Resp BP SpO2 Weight O2 Device O2 Flow Rate (L/min) [facility]    07/10/25 1137 97.8 °F (36.6 °C) 71 20 131/75 94 % -- None (Room air) -- AR    07/10/25 0900 -- 66 -- -- -- -- -- -- AR    07/10/25 0700 -- 64 -- -- -- -- -- -- AR    07/10/25 0400 97.4 °F (36.3 °C) -- 18 130/75 96 % -- None (Room air) -- AS    07/09/25 2031 97.9 °F (36.6 °C) -- 20 135/70 93 % -- None (Room air) -- AS    07/09/25 1723 -- -- -- -- 94 % -- None (Room air) --     07/09/25 1539 -- -- -- -- 95 % -- None (Room air) --     07/09/25 1124 98.4 °F (36.9 °C) -- 18 125/77 96 % -- Nasal cannula 1 L/min CV    07/09/25 0924 -- -- -- -- 94 % -- Nasal cannula 1 L/min     07/09/25 0922 -- -- -- -- 96 % -- Nasal cannula 1 L/min     07/09/25 0808 -- -- -- -- -- -- Nasal cannula 2 L/min DK    07/09/25 0441 98.1 °F (36.7 °C) 57 18 123/75 95 % -- Nasal cannula 2 L/min RG

## 2025-07-10 NOTE — PLAN OF CARE
No acute changes today. IV abx as ordered. Plan for NPO at midnight for bronch in the morning.     Problem: Patient Centered Care  Goal: Patient preferences are identified and integrated in the patient's plan of care  Description: Interventions:  - What would you like us to know as we care for you?   - Provide timely, complete, and accurate information to patient/family  - Incorporate patient and family knowledge, values, beliefs, and cultural backgrounds into the planning and delivery of care  - Encourage patient/family to participate in care and decision-making at the level they choose  - Honor patient and family perspectives and choices  Outcome: Progressing     Problem: Patient/Family Goals  Goal: Patient/Family Long Term Goal  Description: Patient's Long Term Goal:     Interventions:  -  - See additional Care Plan goals for specific interventions  Outcome: Progressing  Goal: Patient/Family Short Term Goal  Description: Patient's Short Term Goal:     Interventions:   -   - See additional Care Plan goals for specific interventions  Outcome: Progressing     Problem: RISK FOR INFECTION - ADULT  Goal: Absence of fever/infection during anticipated neutropenic period  Description: INTERVENTIONS  - Monitor WBC  - Administer growth factors as ordered  - Implement neutropenic guidelines  Outcome: Progressing     Problem: SAFETY ADULT - FALL  Goal: Free from fall injury  Description: INTERVENTIONS:  - Assess pt frequently for physical needs  - Identify cognitive and physical deficits and behaviors that affect risk of falls.  - [place] fall precautions as indicated by assessment.  - Educate pt/family on patient safety including physical limitations  - Instruct pt to call for assistance with activity based on assessment  - Modify environment to reduce risk of injury  - Provide assistive devices as appropriate  - Consider OT/PT consult to assist with strengthening/mobility  - Encourage toileting schedule  Outcome:  Progressing     Problem: RESPIRATORY - ADULT  Goal: Achieves optimal ventilation and oxygenation  Description: INTERVENTIONS:  - Assess for changes in respiratory status  - Assess for changes in mentation and behavior  - Position to facilitate oxygenation and minimize respiratory effort  - Oxygen supplementation based on oxygen saturation or ABGs  - Provide Smoking Cessation handout, if applicable  - Encourage broncho-pulmonary hygiene including cough, deep breathe, Incentive Spirometry  - Assess the need for suctioning and perform as needed  - Assess and instruct to report SOB or any respiratory difficulty  - Respiratory Therapy support as indicated  - Manage/alleviate anxiety  - Monitor for signs/symptoms of CO2 retention  Outcome: Progressing     Problem: PAIN - ADULT  Goal: Verbalizes/displays adequate comfort level or patient's stated pain goal  Description: INTERVENTIONS:  - Encourage pt to monitor pain and request assistance  - Assess pain using appropriate pain scale  - Administer analgesics based on type and severity of pain and evaluate response  - Implement non-pharmacological measures as appropriate and evaluate response  - Consider cultural and social influences on pain and pain management  - Manage/alleviate anxiety  - Utilize distraction and/or relaxation techniques  - Monitor for opioid side effects  - Notify MD/LIP if interventions unsuccessful or patient reports new pain  - Anticipate increased pain with activity and pre-medicate as appropriate  Outcome: Progressing     Problem: DISCHARGE PLANNING  Goal: Discharge to home or other facility with appropriate resources  Description: INTERVENTIONS:  - Identify barriers to discharge w/pt and caregiver  - Include patient/family/discharge partner in discharge planning  - Arrange for needed discharge resources and transportation as appropriate  - Identify discharge learning needs (meds, wound care, etc)  - Arrange for interpreters to assist at discharge as  needed  - Consider post-discharge preferences of patient/family/discharge partner  - Complete POLST form as appropriate  - Assess patient's ability to be responsible for managing their own health  - Refer to Case Management Department for coordinating discharge planning if the patient needs post-hospital services based on physician/LIP order or complex needs related to functional status, cognitive ability or social support system  Outcome: Progressing     Problem: Altered Communication/Language Barrier  Goal: Patient/Family is able to understand and participate in their care  Description: Interventions:  - Assess communication ability and preferred communication style  - Implement communication aides and strategies  - Use visual cues when possible  - Listen attentively, be patient, do not interrupt  - Minimize distractions  - Allow time for understanding and response  - Establish method for patient to ask for assistance (call light)  - Provide an  as needed  - Communicate barriers and strategies to overcome with those who interact with patient  Outcome: Progressing     Problem: GASTROINTESTINAL - ADULT  Goal: Maintains or returns to baseline bowel function  Description: INTERVENTIONS:  - Assess bowel function  - Maintain adequate hydration with IV or PO as ordered and tolerated  - Evaluate effectiveness of GI medications  - Encourage mobilization and activity  - Obtain nutritional consult as needed  - Establish a toileting routine/schedule  - Consider collaborating with pharmacy to review patient's medication profile  Outcome: Progressing

## 2025-07-10 NOTE — PROGRESS NOTES
Pulmonary Medicine Inpatient Progress Note             Consult requested by Dr. Mireles for PNA.        Subjective:  Feels better  Didn't cough last night  Weaned to RA      From the initial consultation  HPI: 63 yo male with hx of recent allergic reaction requiring steroids, HTN now admitted with coughing, shortness of breath, and fevers x 3 days.   No sick contacts.   Denies previous hx of pulmonary problems. No hx of asthma, COPD, recurrent PNAs  CXR concerning for PNA  Started on abx-unasyn/azithro along with nebs and solumedrol  Pt is afebrile on 4 LNC supp 02      REVIEW OF SYSTEMS:  Positives and negatives as stated in HPI. Remainder of 12 pt review of systems otherwise are negative.     PAST MEDICAL HISTORY:  Past Medical History[1]     PAST SURGICAL HISTORY:  Past Surgical History[2]     PAST FAMILY HISTORY:  Family History[3]     PAST SOCIAL HISTORY:  Social Hx on file[4]  Quit smoking 18 yrs. 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 No OP exudates  NECK/THROAT: no JVD. Trachea midline. No obvious thyromegaly  LUNG: clear no wheezing, crackles. Chest symmetric with respiratory motion  HEART: regular rate and rhythm, no obvious murmers or gallops note  ABD: soft non tender. + bowel sounds. No organomegaly noted  EXT: no clubbing, cyanosis. + trace b/l LE edema       IMAGES:  US chest 7/9/25  Small volume pleural effusion. Thoracentesis was  deferred.     Chest CT scan 7/7/25  CONCLUSION  1.  Marked mediastinal lymphadenopathy. Coexistent splenomegaly and gastrohepatic ligament lymphadenopathy. Differential:lymphoma, metastases, and less likely sarcoid or benign etiology.   2.  A 16 mm ill-defined anterior right upper lobe pulmonary nodule-differential includes primary malignancy, metastatic disease, and less likely sarcoid or infection.   3.  Right greater than left basilar pleural effusion with mild pulmonary edema.   4.  Multifocal peripheral consolidations with nodularity differential includes atypical infectious process, and malignancy.     CXR 7/6/25  CONCLUSION: Multifocal bilateral patchy opacities may represent pneumonia or edema.       LABS:  Recent Labs   Lab 07/06/25  1245 07/07/25  0652 07/08/25  0651 07/09/25  0618 07/10/25  0651   RBC 4.54 4.17* 3.50* 3.44* 3.66*   HGB 13.0 12.0* 10.2* 9.9* 10.4*   HCT 40.0 36.6* 31.1* 31.3* 32.3*   MCV 88.1 87.8 88.9 91.0 88.3   MCH 28.6 28.8 29.1 28.8 28.4   MCHC 32.5 32.8 32.8 31.6 32.2   RDW 15.0 15.2* 15.3* 15.6* 15.5*   NEPRELIM 6.47 7.32  --   --  4.59   WBC 10.1 10.6 11.0 8.8 8.6   .0 215.0 227.0 226.0 249.0       Recent Labs   Lab 07/08/25  0651 07/09/25  0618 07/10/25  0651   * 107* 87   BUN 25* 22 17   CREATSERUM 0.86 0.78 0.71   EGFRCR 97 100 103   CA 10.2 9.9 9.5   ALB 2.6* 2.6* 2.7*    144 140   K 3.9 3.8 3.6  3.6    110 107   CO2 26.0 28.0 28.0   ALKPHO 410* 336* 297*   AST 17 39* 45*   ALT 44 52* 81*   BILT 0.3 0.3 0.3   TP 7.4 7.3 7.4       ASSESSMENT/PLAN:  Multifocal PNA  -CXR with b/l infiltrates  -started on empiric unasyn/azithro  -urine leg neg   -cont supp 02-currently weaned to RA  -nebs  -can continue short course solumedrol  -chest CT concerning for pulmonary infiltrates, lung nodule, and bulky mediastinal lymphadenopathy. There is a small right pleural effusion  -attempted bedside US guided thoracentesis but pleural effusion is small with lung in the  middle of the way obstructing ability to safely perform thoracentesis  -plan for bronchoscopy with EBUS on Friday am  -keep NPO after midnight     Recent allergic reaction  -s/p tx with steroids last month  -previously saw allergy and ENT    Proph  -DVT: hep subcutaneous-hold Friday am dose    Dispo  -full code  -updated wife at bedside     Thank you for the opportunity to care for Karel Barragan       LIBRADO Johnson DO, MPH  Pulmonary Critical Care Medicine  [facility]              [1]   Past Medical History:  Diagnosis Date    Essential hypertension 2023    Hyperlipidemia    [2] History reviewed. No pertinent surgical history.  [3]   Family History  Problem Relation Age of Onset    Lung Disorder Mother     Heart Disease Father    [4]   Social History  Socioeconomic History    Marital status:    Tobacco Use    Smoking status: Former     Current packs/day: 0.00     Types: Cigarettes     Quit date: 2008     Years since quittin.5     Passive exposure: Past    Smokeless tobacco: Former   Vaping Use    Vaping status: Never Used   Substance and Sexual Activity    Alcohol use: Yes     Alcohol/week: 3.0 standard drinks of alcohol     Types: 3 Cans of beer per week     Comment: maybe 3 beers a day    Drug use: Never   [5]   Allergies  Allergen Reactions    Citrated Pterylmonoglutamic Acid [Glutamic Acid] UNKNOWN    Dander UNKNOWN    [place] UNKNOWN    Ragweed UNKNOWN    [place] UNKNOWN    Lisinopril RASH   [6]   Outpatient Medications Marked as Taking for the 25 encounter (Hospital Encounter)   Medication Sig Dispense Refill    diphenhydrAMINE (BANOPHEN) 25 MG Oral Cap Take 1 capsule (25 mg total) by mouth every 6 (six) hours as needed for Itching or Allergies.      [] triamcinolone 0.5 % External Cream Apply 1 Application topically 2 (two) times daily for 10 days. Apply twice daily for 7-10 days. Do NOT use on face or in folds of  skin. 15 g 3    amLODIPine 10 MG Oral Tab Take 1 tablet (10 mg total) by mouth daily. 90 tablet 3    rosuvastatin 10 MG Oral Tab Take 1 tablet (10 mg total) by mouth nightly. FOR CHOLESTEROL. 90 tablet 3    EPINEPHrine (EPIPEN 2-ROSENDA) 0.3 MG/0.3ML Injection Solution Auto-injector Inject IM in event of  allergic reaction 1 each 0    levocetirizine 5 MG Oral Tab Take 1 tablet (5 mg total) by mouth in the morning and 1 tablet (5 mg total) before bedtime. 180 tablet 1    albuterol (VENTOLIN HFA) 108 (90 Base) MCG/ACT Inhalation Aero Soln Inhale 2 puffs into the lungs every 4 (four) hours as needed for Wheezing. 1 each 0   [7]    potassium chloride  40 mEq Oral Q4H    predniSONE  60 mg Oral Daily with breakfast    heparin  5,000 Units Subcutaneous 2 times per day    ampicillin-sulbactam  3 g Intravenous q6h    ipratropium-albuterol  3 mL Nebulization Q6H WA   [8] [9]   benzonatate    guaiFENesin    sodium chloride    ipratropium-albuterol    benzocaine-menthol

## 2025-07-11 ENCOUNTER — ANESTHESIA EVENT (OUTPATIENT)
Dept: ENDOSCOPY | Facility: HOSPITAL | Age: 65
End: 2025-07-11
Payer: COMMERCIAL

## 2025-07-11 ENCOUNTER — ANESTHESIA (OUTPATIENT)
Dept: ENDOSCOPY | Facility: HOSPITAL | Age: 65
End: 2025-07-11
Payer: COMMERCIAL

## 2025-07-11 LAB
ALBUMIN SERPL-MCNC: 3 G/DL (ref 3.2–4.8)
ALBUMIN/GLOB SERPL: 0.6 {RATIO} (ref 1–2)
ALP LIVER SERPL-CCNC: 291 U/L (ref 45–117)
ALT SERPL-CCNC: 108 U/L (ref 10–49)
ANION GAP SERPL CALC-SCNC: 7 MMOL/L (ref 0–18)
AST SERPL-CCNC: 46 U/L (ref ?–34)
BASOPHILS # BLD: 0 X10(3) UL (ref 0–0.2)
BASOPHILS NFR BLD: 0 %
BASOPHILS NFR BRONCH: 0 %
BILIRUB SERPL-MCNC: 0.4 MG/DL (ref 0.2–1.1)
BUN BLD-MCNC: 15 MG/DL (ref 9–23)
BUN/CREAT SERPL: 22.4 (ref 10–20)
CALCIUM BLD-MCNC: 9.4 MG/DL (ref 8.7–10.4)
CHLORIDE SERPL-SCNC: 104 MMOL/L (ref 98–112)
CO2 SERPL-SCNC: 26 MMOL/L (ref 21–32)
COLOR FLD: COLORLESS
CREAT BLD-MCNC: 0.67 MG/DL (ref 0.7–1.3)
DEPRECATED RDW RBC AUTO: 48.8 FL (ref 35.1–46.3)
EGFRCR SERPLBLD CKD-EPI 2021: 105 ML/MIN/1.73M2 (ref 60–?)
EOSINOPHIL # BLD: 0.22 X10(3) UL (ref 0–0.7)
EOSINOPHIL NFR BLD: 2 %
EOSINOPHIL NFR BRONCH: 0 %
ERYTHROCYTE [DISTWIDTH] IN BLOOD BY AUTOMATED COUNT: 15.4 % (ref 11–15)
GLOBULIN PLAS-MCNC: 5 G/DL (ref 2–3.5)
GLUCOSE BLD-MCNC: 71 MG/DL (ref 70–99)
HCT VFR BLD AUTO: 35.8 % (ref 39–53)
HGB BLD-MCNC: 11.5 G/DL (ref 13–17.5)
LYMPHOCYTES NFR BLD: 18 %
LYMPHOCYTES NFR BLD: 2.02 X10(3) UL (ref 1–4)
LYMPHOCYTES NFR BRONCH: 1 %
MAGNESIUM SERPL-MCNC: 1.7 MG/DL (ref 1.6–2.6)
MCH RBC QN AUTO: 28.1 PG (ref 26–34)
MCHC RBC AUTO-ENTMCNC: 32.1 G/DL (ref 31–37)
MCV RBC AUTO: 87.5 FL (ref 80–100)
METAMYELOCYTES # BLD: 0.34 X10(3) UL (ref ?–0.01)
METAMYELOCYTES NFR BLD: 3 %
MONOCYTES # BLD: 1.01 X10(3) UL (ref 0.1–1)
MONOCYTES NFR BLD: 9 %
MONOS+MACROS NFR BRONCH: 3 %
MORPHOLOGY: NORMAL
MYELOCYTES # BLD: 0.34 X10(3) UL (ref ?–0.01)
MYELOCYTES NFR BLD: 3 %
NEUTROPHILS # BLD AUTO: 5.8 X10 (3) UL (ref 1.5–7.7)
NEUTROPHILS NFR BLD: 63 %
NEUTROPHILS NFR BRONCH: 96 %
NEUTS BAND NFR BLD: 2 %
NEUTS HYPERSEG # BLD: 7.28 X10(3) UL (ref 1.5–7.7)
OSMOLALITY SERPL CALC.SUM OF ELEC: 283 MOSM/KG (ref 275–295)
PHOSPHATE SERPL-MCNC: 3.3 MG/DL (ref 2.4–5.1)
PLATELET # BLD AUTO: 351 10(3)UL (ref 150–450)
PLATELET MORPHOLOGY: NORMAL
POTASSIUM SERPL-SCNC: 3.9 MMOL/L (ref 3.5–5.1)
PROT SERPL-MCNC: 8 G/DL (ref 5.7–8.2)
PTH-RELATED PEPTIDE: <2 PMOL/L
RBC # BLD AUTO: 4.09 X10(6)UL (ref 4.3–5.7)
RBC # FLD: 215 /CUMM (ref ?–1)
SODIUM SERPL-SCNC: 137 MMOL/L (ref 136–145)
TOTAL CELLS COUNTED BLD: 100
TOTAL CELLS COUNTED BRONCH: 430 /CUMM (ref ?–1)
TOTAL CELLS COUNTED FLD: 100
TURBIDITY CSF QL: CLEAR
WBC # BLD AUTO: 11.2 X10(3) UL (ref 4–11)
WBC CALC (IRIS) BRW: 430 /CUMM

## 2025-07-11 PROCEDURE — 0B9F8ZX DRAINAGE OF RIGHT LOWER LUNG LOBE, VIA NATURAL OR ARTIFICIAL OPENING ENDOSCOPIC, DIAGNOSTIC: ICD-10-PCS | Performed by: INTERNAL MEDICINE

## 2025-07-11 PROCEDURE — 99233 SBSQ HOSP IP/OBS HIGH 50: CPT | Performed by: INTERNAL MEDICINE

## 2025-07-11 PROCEDURE — 31624 DX BRONCHOSCOPE/LAVAGE: CPT | Performed by: INTERNAL MEDICINE

## 2025-07-11 PROCEDURE — 07D78ZX EXTRACTION OF THORAX LYMPHATIC, VIA NATURAL OR ARTIFICIAL OPENING ENDOSCOPIC, DIAGNOSTIC: ICD-10-PCS | Performed by: INTERNAL MEDICINE

## 2025-07-11 PROCEDURE — 31652 BRONCH EBUS SAMPLNG 1/2 NODE: CPT | Performed by: INTERNAL MEDICINE

## 2025-07-11 RX ORDER — MORPHINE SULFATE 4 MG/ML
2 INJECTION, SOLUTION INTRAMUSCULAR; INTRAVENOUS EVERY 10 MIN PRN
Status: DISCONTINUED | OUTPATIENT
Start: 2025-07-11 | End: 2025-07-11 | Stop reason: HOSPADM

## 2025-07-11 RX ORDER — IPRATROPIUM BROMIDE AND ALBUTEROL SULFATE 2.5; .5 MG/3ML; MG/3ML
3 SOLUTION RESPIRATORY (INHALATION) ONCE
Status: COMPLETED | OUTPATIENT
Start: 2025-07-11 | End: 2025-07-11

## 2025-07-11 RX ORDER — HYDROMORPHONE HYDROCHLORIDE 1 MG/ML
0.6 INJECTION, SOLUTION INTRAMUSCULAR; INTRAVENOUS; SUBCUTANEOUS EVERY 5 MIN PRN
Status: DISCONTINUED | OUTPATIENT
Start: 2025-07-11 | End: 2025-07-11 | Stop reason: HOSPADM

## 2025-07-11 RX ORDER — SODIUM CHLORIDE, SODIUM LACTATE, POTASSIUM CHLORIDE, CALCIUM CHLORIDE 600; 310; 30; 20 MG/100ML; MG/100ML; MG/100ML; MG/100ML
INJECTION, SOLUTION INTRAVENOUS CONTINUOUS
Status: DISCONTINUED | OUTPATIENT
Start: 2025-07-11 | End: 2025-07-12

## 2025-07-11 RX ORDER — NALOXONE HYDROCHLORIDE 0.4 MG/ML
0.08 INJECTION, SOLUTION INTRAMUSCULAR; INTRAVENOUS; SUBCUTANEOUS AS NEEDED
Status: DISCONTINUED | OUTPATIENT
Start: 2025-07-11 | End: 2025-07-11 | Stop reason: HOSPADM

## 2025-07-11 RX ORDER — PROCHLORPERAZINE EDISYLATE 5 MG/ML
5 INJECTION INTRAMUSCULAR; INTRAVENOUS EVERY 8 HOURS PRN
Status: DISCONTINUED | OUTPATIENT
Start: 2025-07-11 | End: 2025-07-11 | Stop reason: HOSPADM

## 2025-07-11 RX ORDER — ROCURONIUM BROMIDE 10 MG/ML
INJECTION, SOLUTION INTRAVENOUS AS NEEDED
Status: DISCONTINUED | OUTPATIENT
Start: 2025-07-11 | End: 2025-07-11 | Stop reason: SURG

## 2025-07-11 RX ORDER — MORPHINE SULFATE 10 MG/ML
6 INJECTION, SOLUTION INTRAMUSCULAR; INTRAVENOUS EVERY 10 MIN PRN
Status: DISCONTINUED | OUTPATIENT
Start: 2025-07-11 | End: 2025-07-11 | Stop reason: HOSPADM

## 2025-07-11 RX ORDER — ONDANSETRON 2 MG/ML
4 INJECTION INTRAMUSCULAR; INTRAVENOUS EVERY 6 HOURS PRN
Status: DISCONTINUED | OUTPATIENT
Start: 2025-07-11 | End: 2025-07-11 | Stop reason: HOSPADM

## 2025-07-11 RX ORDER — SODIUM CHLORIDE, SODIUM LACTATE, POTASSIUM CHLORIDE, CALCIUM CHLORIDE 600; 310; 30; 20 MG/100ML; MG/100ML; MG/100ML; MG/100ML
INJECTION, SOLUTION INTRAVENOUS CONTINUOUS PRN
Status: DISCONTINUED | OUTPATIENT
Start: 2025-07-11 | End: 2025-07-11 | Stop reason: SURG

## 2025-07-11 RX ORDER — MAGNESIUM OXIDE 400 MG/1
400 TABLET ORAL ONCE
Status: COMPLETED | OUTPATIENT
Start: 2025-07-11 | End: 2025-07-11

## 2025-07-11 RX ORDER — MORPHINE SULFATE 4 MG/ML
4 INJECTION, SOLUTION INTRAMUSCULAR; INTRAVENOUS EVERY 10 MIN PRN
Status: DISCONTINUED | OUTPATIENT
Start: 2025-07-11 | End: 2025-07-11 | Stop reason: HOSPADM

## 2025-07-11 RX ORDER — METHYLPREDNISOLONE SODIUM SUCCINATE 40 MG/ML
40 INJECTION INTRAMUSCULAR; INTRAVENOUS ONCE
Status: COMPLETED | OUTPATIENT
Start: 2025-07-11 | End: 2025-07-11

## 2025-07-11 RX ORDER — HYDROMORPHONE HYDROCHLORIDE 1 MG/ML
0.2 INJECTION, SOLUTION INTRAMUSCULAR; INTRAVENOUS; SUBCUTANEOUS EVERY 5 MIN PRN
Status: DISCONTINUED | OUTPATIENT
Start: 2025-07-11 | End: 2025-07-11 | Stop reason: HOSPADM

## 2025-07-11 RX ORDER — LIDOCAINE HYDROCHLORIDE 40 MG/ML
SOLUTION TOPICAL AS NEEDED
Status: DISCONTINUED | OUTPATIENT
Start: 2025-07-11 | End: 2025-07-11 | Stop reason: SURG

## 2025-07-11 RX ORDER — HYDROMORPHONE HYDROCHLORIDE 1 MG/ML
0.4 INJECTION, SOLUTION INTRAMUSCULAR; INTRAVENOUS; SUBCUTANEOUS EVERY 5 MIN PRN
Status: DISCONTINUED | OUTPATIENT
Start: 2025-07-11 | End: 2025-07-11 | Stop reason: HOSPADM

## 2025-07-11 RX ORDER — ONDANSETRON 2 MG/ML
INJECTION INTRAMUSCULAR; INTRAVENOUS AS NEEDED
Status: DISCONTINUED | OUTPATIENT
Start: 2025-07-11 | End: 2025-07-11 | Stop reason: SURG

## 2025-07-11 RX ORDER — DEXAMETHASONE SODIUM PHOSPHATE 4 MG/ML
VIAL (ML) INJECTION AS NEEDED
Status: DISCONTINUED | OUTPATIENT
Start: 2025-07-11 | End: 2025-07-11 | Stop reason: SURG

## 2025-07-11 RX ORDER — MIDAZOLAM HYDROCHLORIDE 1 MG/ML
INJECTION INTRAMUSCULAR; INTRAVENOUS AS NEEDED
Status: DISCONTINUED | OUTPATIENT
Start: 2025-07-11 | End: 2025-07-11 | Stop reason: SURG

## 2025-07-11 RX ORDER — ESMOLOL HYDROCHLORIDE 10 MG/ML
INJECTION INTRAVENOUS AS NEEDED
Status: DISCONTINUED | OUTPATIENT
Start: 2025-07-11 | End: 2025-07-11 | Stop reason: SURG

## 2025-07-11 RX ORDER — GLYCOPYRROLATE 0.2 MG/ML
INJECTION, SOLUTION INTRAMUSCULAR; INTRAVENOUS AS NEEDED
Status: DISCONTINUED | OUTPATIENT
Start: 2025-07-11 | End: 2025-07-11 | Stop reason: SURG

## 2025-07-11 RX ADMIN — ESMOLOL HYDROCHLORIDE 15 MG: 10 INJECTION INTRAVENOUS at 08:07:00

## 2025-07-11 RX ADMIN — ROCURONIUM BROMIDE 10 MG: 10 INJECTION, SOLUTION INTRAVENOUS at 07:47:00

## 2025-07-11 RX ADMIN — ESMOLOL HYDROCHLORIDE 20 MG: 10 INJECTION INTRAVENOUS at 08:34:00

## 2025-07-11 RX ADMIN — SODIUM CHLORIDE, SODIUM LACTATE, POTASSIUM CHLORIDE, CALCIUM CHLORIDE: 600; 310; 30; 20 INJECTION, SOLUTION INTRAVENOUS at 07:44:00

## 2025-07-11 RX ADMIN — MIDAZOLAM HYDROCHLORIDE 2 MG: 1 INJECTION INTRAMUSCULAR; INTRAVENOUS at 07:45:00

## 2025-07-11 RX ADMIN — ROCURONIUM BROMIDE 20 MG: 10 INJECTION, SOLUTION INTRAVENOUS at 08:00:00

## 2025-07-11 RX ADMIN — LIDOCAINE HYDROCHLORIDE 4 ML: 40 SOLUTION TOPICAL at 07:49:00

## 2025-07-11 RX ADMIN — GLYCOPYRROLATE 0.1 MG: 0.2 INJECTION, SOLUTION INTRAMUSCULAR; INTRAVENOUS at 07:45:00

## 2025-07-11 RX ADMIN — ONDANSETRON 4 MG: 2 INJECTION INTRAMUSCULAR; INTRAVENOUS at 08:19:00

## 2025-07-11 RX ADMIN — DEXAMETHASONE SODIUM PHOSPHATE 12 MG: 4 MG/ML VIAL (ML) INJECTION at 08:07:00

## 2025-07-11 NOTE — PROGRESS NOTES
Pt underwent bronchoscopy with EBUS and BAL this am.   Can resume diet at 11 AM and resume previous medications.     Thank you,    LIBRADO Johnson DO, MPH  Pulmonary Critical Care Medicine  [facility]

## 2025-07-11 NOTE — ANESTHESIA POSTPROCEDURE EVALUATION
Patient: Karel Ford    Procedure Summary       Date: 07/11/25 Room / Location: [facility] ENDOSCOPY 05 / [facility] ENDOSCOPY    Anesthesia Start: 0744 Anesthesia Stop: 0902    Procedures:       BRONCOSCOPY      ENDOBRONCHIAL ULTRASOUND (EBUS) Diagnosis: (Mediastinal lymphadenopathy)    Surgeons: Nick Johnson DO Anesthesiologist: John Fung MD    Anesthesia Type: general ASA Status: 2            Anesthesia Type: general    Vitals Value Taken Time   /77 07/11/25 09:01   Temp 97.5 07/11/25 09:02   Pulse 90 07/11/25 09:02   Resp 18 07/11/25 09:02   SpO2 97 % 07/11/25 09:02   Vitals shown include unfiled device data.    [facility] AN Post Evaluation:   Patient Evaluated in PACU  Patient Participation: complete - patient participated  Level of Consciousness: awake  Pain Score: 0  Pain Management: adequate  Airway Patency:patent  Yes    Nausea/Vomiting: none  Cardiovascular Status: acceptable  Respiratory Status: nasal cannula and acceptable  Postoperative Hydration acceptable      John Fung MD  7/11/2025 9:02 AM

## 2025-07-11 NOTE — PROGRESS NOTES
[facility]  part of [facility]    Progress Note    Karel Ford Patient Status:  Inpatient    10/6/1960 MRN [id number]   Location [facility] 4W/SW/SE Attending Tawana Mireles MD   Hosp Day # 5 PCP Martin Adams MD     Chief Complaint: pneumonia    SUBJECTIVE:    Sitting up comfortably in a chair and in NAD.   S/p bronchoscopy 2025.   No overnight events reported.   Plan of care discussed with the family.     10 ROS completed and otherwise negative.    OBJECTIVE:  Vital signs in last 24 hours:  /70   Pulse 84   Temp 98 °F (36.7 °C) (Temporal)   Resp 22   Wt 186 lb (84.4 kg)   SpO2 93%   BMI 30.95 kg/m²     Intake/Output:    Intake/Output Summary (Last 24 hours) at 2025 1143  Last data filed at 2025 0951  Gross per 24 hour   Intake 960 ml   Output 0 ml   Net 960 ml       Wt Readings from Last 3 Encounters:   25 186 lb (84.4 kg)   25 190 lb (86.2 kg)   25 189 lb (85.7 kg)       Exam     Gen: NAD, AO  Pulm: Good inspiratory effort, coarse breath sounds bilaterally  CV: Heart with regular rate and rhythm  Abd: Abdomen soft, nontender, bowel sounds present  Neuro: No acute focal deficits  MSK: moves extremities  Skin: Warm and dry  Psych: Normal affect  Ext: no c/c/e    Data Review:     Labs:   Lab Results   Component Value Date    WBC 11.2 2025    HGB 11.5 2025    HCT 35.8 2025    .0 2025    CREATSERUM 0.67 2025    BUN 15 2025     2025    K 3.9 2025     2025    CO2 26.0 2025    GLU 71 2025    CA 9.4 2025    ALB 3.0 2025    ALKPHO 291 2025    BILT 0.4 2025    TP 8.0 2025    AST 46 2025     2025    MG 1.7 2025    PHOS 3.3 2025       Imaging: reviewed    US CHEST (CPT=76604)  Result Date: 2025  CONCLUSION: Small volume pleural effusion. Thoracentesis was deferred. Electronically Verified and  Signed by Attending Radiologist: Jeanmarie Barajas MD 7/9/2025 7:33 PM Workstation: [id number]      Meds:   Current Hospital Medications[1]      Assessment  Problem List[2]    Plan:     Multifocal pneumonia, possible gram neg pneumonia  Lymphadenopathy and splenomegaly  - imaging reviewed  - started broad spectrum IV abx  - await urine legionella - neg  - sputum culture contaminated  - blood cxs showing NGTD  - pulm consulted  - continue short course of solumedrol  - bedside thoracentesis not done due to safety concerns  - s/p EBUS with FNA needle biopsy of multiple lymph nodes  - continue IV unasyn and azithromycin  - continue duonebs  - Heme/onc consult for concern for malignancy   - agree with above plan   - outpatient PET/CT if the above tests are nondiagnostic  - awaiting cytology and path results     AECOPD  - pt has a smoking history, not currently tobacco user  - started nebs and steroids  - pulm on consult   - continue steroids     Hypercalcemia 2/2 vit D toxicity - resolved  - IVF discontinued  - pth low, concern for malignancy induced hypercalcemia - will check ct a/p and tumor markers - heme/onc consulted  - endo consulted   - appreciate recs  - Ca WNL  - monitor labs     Acute kidney injury   - possibly prerenal?  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 but previous LFTs were WNL  - ALP trending down  - AST, ALT uptrending  - monitor labs  - close opt follow up     DM  - accuchecks,  ISS     HTN  - bp stable without meds, monitor for now     HL  - Hold statin given elevated LFTs        Supplementary Documentation:   DVT Mechanical Prophylaxis:   SCDs,    DVT Pharmacologic Prophylaxis   Medication    heparin (Porcine) 5000 UNIT/ML injection 5,000 Units                Code Status: Not on file  [place]: No  urinary catheter in place  [place] Duration (in days):   Central line:    MYLES: 7/12/2025                   MDM: High complexity-acute illness posing a threat to life. IV meds requiring close inpatient monitoring. I personally spent time on chart/note review, review of labs/imaging, discussion with patient, physical exam, discussion with staff, writing progress note, and discussion of plan of care.               [1]   Current Facility-Administered Medications   Medication Dose Route Frequency    lactated ringers infusion   Intravenous Continuous    magnesium oxide (Mag-Ox) tab 400 mg  400 mg Oral Once    predniSONE (Deltasone) tab 60 mg  60 mg Oral Daily with breakfast    heparin (Porcine) 5000 UNIT/ML injection 5,000 Units  5,000 Units Subcutaneous 2 times per day    benzonatate (Tessalon) cap 200 mg  200 mg Oral TID PRN    guaiFENesin (Robitussin) 100 MG/5 ML oral liquid 200 mg  200 mg Oral Q4H PRN    sodium chloride (Saline Mist) 0.65 % nasal solution 1 spray  1 spray Each Nare Q3H PRN    ipratropium-albuterol (Duoneb) 0.5-2.5 (3) MG/3ML inhalation solution 3 mL  3 mL Nebulization Q6H PRN    ipratropium-albuterol (Duoneb) 0.5-2.5 (3) MG/3ML inhalation solution 3 mL  3 mL Nebulization Q6H WA    benzocaine-menthol (Cepacol) lozenge 1 lozenge  1 lozenge Oral PRN   [2]   Patient Active Problem List  Diagnosis    Essential hypertension    Hyperlipidemia    Multifocal pneumonia    Acute respiratory failure with hypoxia (HCC)    CAROL (acute kidney injury)    Hypercalcemia    Mediastinal lymphadenopathy

## 2025-07-11 NOTE — PLAN OF CARE
Patient is ambulating. Patient is on room air however is currently on 2L via NC for comfort while sleeping. Patient is on remote tele. Patient is on po abx. Patient is NPO. Patient denies n/v/d and pain. Patient is voiding freely. Sub q heparin on hold for IR procedure scheduled today.  Safety measures are in place.       Problem: Patient Centered Care  Goal: Patient preferences are identified and integrated in the patient's plan of care  Description: Interventions:  - What would you like us to know as we care for you? I am from home and I speak Greek. My niece Anneliese is a Nurse at [employer].  - Provide timely, complete, and accurate information to patient/family  - Incorporate patient and family knowledge, values, beliefs, and cultural backgrounds into the planning and delivery of care  - Encourage patient/family to participate in care and decision-making at the level they choose  - Honor patient and family perspectives and choices  Outcome: Progressing     Problem: Patient/Family Goals  Goal: Patient/Family Long Term Goal  Description: Patient's Long Term Goal: Discharge home     Interventions:  - Monitor vitals  - Monitor labs  - Remote tele  - PRN O2  - PO abx  - Hep gtt  - Onc on consult  - Pulmonology on consult  - See additional Care Plan goals for specific interventions  Outcome: Progressing  Goal: Patient/Family Short Term Goal  Description: Patient's Short Term Goal: Manage SOB    Interventions:   - Monitor vitals  - Monitor labs  - Hep gtt  - Remote tele  - PRN O2  - Onc on consult  - Pulmonology on consult   - Endo on consult   - See additional Care Plan goals for specific interventions  Outcome: Progressing     Problem: RISK FOR INFECTION - ADULT  Goal: Absence of fever/infection during anticipated neutropenic period  Description: INTERVENTIONS  - Monitor WBC  - Administer growth factors as ordered  - Implement neutropenic guidelines  Outcome: Progressing     Problem: SAFETY ADULT - FALL  Goal: Free from  fall injury  Description: INTERVENTIONS:  - Assess pt frequently for physical needs  - Identify cognitive and physical deficits and behaviors that affect risk of falls.  - [place] fall precautions as indicated by assessment.  - Educate pt/family on patient safety including physical limitations  - Instruct pt to call for assistance with activity based on assessment  - Modify environment to reduce risk of injury  - Provide assistive devices as appropriate  - Consider OT/PT consult to assist with strengthening/mobility  - Encourage toileting schedule  Outcome: Progressing     Problem: RESPIRATORY - ADULT  Goal: Achieves optimal ventilation and oxygenation  Description: INTERVENTIONS:  - Assess for changes in respiratory status  - Assess for changes in mentation and behavior  - Position to facilitate oxygenation and minimize respiratory effort  - Oxygen supplementation based on oxygen saturation or ABGs  - Provide Smoking Cessation handout, if applicable  - Encourage broncho-pulmonary hygiene including cough, deep breathe, Incentive Spirometry  - Assess the need for suctioning and perform as needed  - Assess and instruct to report SOB or any respiratory difficulty  - Respiratory Therapy support as indicated  - Manage/alleviate anxiety  - Monitor for signs/symptoms of CO2 retention  Outcome: Progressing     Problem: PAIN - ADULT  Goal: Verbalizes/displays adequate comfort level or patient's stated pain goal  Description: INTERVENTIONS:  - Encourage pt to monitor pain and request assistance  - Assess pain using appropriate pain scale  - Administer analgesics based on type and severity of pain and evaluate response  - Implement non-pharmacological measures as appropriate and evaluate response  - Consider cultural and social influences on pain and pain management  - Manage/alleviate anxiety  - Utilize distraction and/or relaxation techniques  - Monitor for opioid side effects  - Notify MD/LIP if interventions unsuccessful  or patient reports new pain  - Anticipate increased pain with activity and pre-medicate as appropriate  Outcome: Progressing     Problem: DISCHARGE PLANNING  Goal: Discharge to home or other facility with appropriate resources  Description: INTERVENTIONS:  - Identify barriers to discharge w/pt and caregiver  - Include patient/family/discharge partner in discharge planning  - Arrange for needed discharge resources and transportation as appropriate  - Identify discharge learning needs (meds, wound care, etc)  - Arrange for interpreters to assist at discharge as needed  - Consider post-discharge preferences of patient/family/discharge partner  - Complete POLST form as appropriate  - Assess patient's ability to be responsible for managing their own health  - Refer to Case Management Department for coordinating discharge planning if the patient needs post-hospital services based on physician/LIP order or complex needs related to functional status, cognitive ability or social support system  Outcome: Progressing     Problem: Altered Communication/Language Barrier  Goal: Patient/Family is able to understand and participate in their care  Description: Interventions:  - Assess communication ability and preferred communication style  - Implement communication aides and strategies  - Use visual cues when possible  - Listen attentively, be patient, do not interrupt  - Minimize distractions  - Allow time for understanding and response  - Establish method for patient to ask for assistance (call light)  - Provide an  as needed  - Communicate barriers and strategies to overcome with those who interact with patient  Outcome: Progressing     Problem: GASTROINTESTINAL - ADULT  Goal: Maintains or returns to baseline bowel function  Description: INTERVENTIONS:  - Assess bowel function  - Maintain adequate hydration with IV or PO as ordered and tolerated  - Evaluate effectiveness of GI medications  - Encourage mobilization and  activity  - Obtain nutritional consult as needed  - Establish a toileting routine/schedule  - Consider collaborating with pharmacy to review patient's medication profile  Outcome: Progressing     Problem: CARDIOVASCULAR - ADULT  Goal: Maintains optimal cardiac output and hemodynamic stability  Description: INTERVENTIONS:  - Monitor vital signs, rhythm, and trends  - Monitor for bleeding, hypotension and signs of decreased cardiac output  - Evaluate effectiveness of vasoactive medications to optimize hemodynamic stability  - Monitor arterial and/or venous puncture sites for bleeding and/or hematoma  - Assess quality of pulses, skin color and temperature  - Assess for signs of decreased coronary artery perfusion - ex. Angina  - Evaluate fluid balance, assess for edema, trend weights  Outcome: Progressing  Goal: Absence of cardiac arrhythmias or at baseline  Description: INTERVENTIONS:  - Continuous cardiac monitoring, monitor vital signs, obtain 12 lead EKG if indicated  - Evaluate effectiveness of antiarrhythmic and heart rate control medications as ordered  - Initiate emergency measures for life threatening arrhythmias  - Monitor electrolytes and administer replacement therapy as ordered  Outcome: Progressing     Problem: METABOLIC/FLUID AND ELECTROLYTES - ADULT  Goal: Electrolytes maintained within normal limits  Description: INTERVENTIONS:  - Monitor labs and rhythm and assess patient for signs and symptoms of electrolyte imbalances  - Administer electrolyte replacement as ordered  - Monitor response to electrolyte replacements, including rhythm and repeat lab results as appropriate  - Fluid restriction as ordered  - Instruct patient on fluid and nutrition restrictions as appropriate  Outcome: Progressing  Goal: Hemodynamic stability and optimal renal function maintained  Description: INTERVENTIONS:  - Monitor labs and assess for signs and symptoms of volume excess or deficit  - Monitor intake, output and patient  weight  - Monitor urine specific gravity, serum osmolarity and serum sodium as indicated or ordered  - Monitor response to interventions for patient's volume status, including labs, urine output, blood pressure (other measures as available)  - Encourage oral intake as appropriate  - Instruct patient on fluid and nutrition restrictions as appropriate  Outcome: Progressing     Problem: SKIN/TISSUE INTEGRITY - ADULT  Goal: Skin integrity remains intact  Description: INTERVENTIONS  - Assess and document risk factors for pressure ulcer development  - Assess and document skin integrity  - Monitor for areas of redness and/or skin breakdown  - Initiate interventions, skin care algorithm/standards of care as needed  Outcome: Progressing  Goal: Oral mucous membranes remain intact  Description: INTERVENTIONS  - Assess oral mucosa and hygiene practices  - Implement preventative oral hygiene regimen  - Implement oral medicated treatments as ordered  Outcome: Progressing     Problem: HEMATOLOGIC - ADULT  Goal: Maintains hematologic stability  Description: INTERVENTIONS  - Assess for signs and symptoms of bleeding or hemorrhage  - Monitor labs and vital signs for trends  - Administer supportive blood products/factors, fluids and medications as ordered and appropriate  - Administer supportive blood products/factors as ordered and appropriate  Outcome: Progressing  Goal: Free from bleeding injury  Description: (Example usage: patient with low platelets)  INTERVENTIONS:  - Avoid intramuscular injections, enemas and rectal medication administration  - Ensure safe mobilization of patient  - Hold pressure on venipuncture sites to achieve adequate hemostasis  - Assess for signs and symptoms of internal bleeding  - Monitor lab trends  - Patient is to report abnormal signs of bleeding to staff  - Avoid use of toothpicks and dental floss  - Use electric shaver for shaving  - Use soft bristle tooth brush  - Limit straining and forceful nose  blowing  Outcome: Progressing

## 2025-07-11 NOTE — ANESTHESIA PREPROCEDURE EVALUATION
Anesthesia PreOp Note    HPI:     Karel Ford is a 64 year old male who presents for preoperative consultation requested by: Nick Johnson DO    Date of Surgery: 7/6/2025 - 7/11/2025    Procedure(s):  BRONCOSCOPY  ENDOBRONCHIAL ULTRASOUND (EBUS)  Indication: u    Relevant Problems   No relevant active problems       NPO:                         History Review:  Patient Active Problem List    Diagnosis Date Noted    Mediastinal lymphadenopathy 07/08/2025    Multifocal pneumonia 07/06/2025    Acute respiratory failure with hypoxia (HCC) 07/06/2025    CAROL (acute kidney injury) 07/06/2025    Hypercalcemia 07/06/2025    Essential hypertension 06/25/2025    Hyperlipidemia 06/25/2025       Past Medical History[1]    Past Surgical History[2]    Prescriptions Prior to Admission[3]  Current Medications and Prescriptions Ordered in Epic[4]    Allergies[5]    Family History[6]  Social Hx on file[7]    Available pre-op labs reviewed.  Lab Results   Component Value Date    WBC 8.6 07/10/2025    RBC 3.66 (L) 07/10/2025    HGB 10.4 (L) 07/10/2025    HCT 32.3 (L) 07/10/2025    MCV 88.3 07/10/2025    MCH 28.4 07/10/2025    MCHC 32.2 07/10/2025    RDW 15.5 (H) 07/10/2025    .0 07/10/2025     Lab Results   Component Value Date     07/10/2025    K 4.6 07/10/2025     07/10/2025    CO2 28.0 07/10/2025    BUN 17 07/10/2025    CREATSERUM 0.71 07/10/2025    GLU 87 07/10/2025    CA 9.5 07/10/2025          Vital Signs:  Body mass index is 30.95 kg/m².   weight is 84.4 kg (186 lb). His temporal temperature is 97.2 °F (36.2 °C). His blood pressure is 122/75 and his pulse is 55. His respiration is 18 and oxygen saturation is 97%.   Vitals:    07/11/25 0326 07/11/25 0349 07/11/25 0400 07/11/25 0500   BP:  122/75     Pulse: 58 65 79 55   Resp:  18     Temp:  97.2 °F (36.2 °C)     TempSrc:  Temporal     SpO2:  97%     Weight:            Anesthesia Evaluation      Airway   Mallampati: II  TM distance: >3 FB  Neck ROM: full   Dental - Dentition appears grossly intact     Pulmonary - normal exam   Cardiovascular - normal exam  (+) hypertension    Neuro/Psych    (+)  psychiatric history,        GI/Hepatic/Renal      Endo/Other    Abdominal                  Anesthesia Plan:   ASA:  2  Plan:   General  Airway:  ETT      I have informed Karel Ford and/or legal guardian or family member of the nature of the anesthetic plan, benefits, risks including possible dental damage if relevant, major complications, and any alternative forms of anesthetic management.   All of the patient's questions were answered to the best of my ability. The patient desires the anesthetic management as planned.  John Fung MD  2025 7:00 AM  Present on Admission:  **None**           [1]   Past Medical History:   Essential hypertension    Hyperlipidemia   [2] History reviewed. No pertinent surgical history.  [3]   Medications Prior to Admission   Medication Sig Dispense Refill Last Dose/Taking    diphenhydrAMINE (BANOPHEN) 25 MG Oral Cap Take 1 capsule (25 mg total) by mouth every 6 (six) hours as needed for Itching or Allergies.   2025 Morning    [] triamcinolone 0.5 % External Cream Apply 1 Application topically 2 (two) times daily for 10 days. Apply twice daily for 7-10 days. Do NOT use on face or in folds of skin. 15 g 3 2025    amLODIPine 10 MG Oral Tab Take 1 tablet (10 mg total) by mouth daily. 90 tablet 3 2025 Morning    rosuvastatin 10 MG Oral Tab Take 1 tablet (10 mg total) by mouth nightly. FOR CHOLESTEROL. 90 tablet 3 2025 Evening    EPINEPHrine (EPIPEN 2-ROSENDA) 0.3 MG/0.3ML Injection Solution Auto-injector Inject IM in event of  allergic reaction 1 each 0 Taking    levocetirizine 5 MG Oral Tab Take 1 tablet (5 mg total) by mouth in the morning and 1 tablet (5 mg total) before bedtime. 180 tablet 1 2025 Morning    albuterol (VENTOLIN HFA) 108 (90 Base) MCG/ACT Inhalation Aero Soln Inhale 2 puffs into the lungs every 4 (four)  hours as needed for Wheezing. 1 each 0 7/5/2025 Evening    methylPREDNISolone 4 MG Oral Tablet Therapy Pack Take as directed with food. (Patient not taking: Reported on 7/6/2025) 1 each 0 Not Taking    methylPREDNISolone (MEDROL) 4 MG Oral Tablet Therapy Pack Dosepack: take as directed (Patient not taking: Reported on 6/25/2025) 1 each 0    [4]   Current Facility-Administered Medications Ordered in Epic   Medication Dose Route Frequency Provider Last Rate Last Admin    predniSONE (Deltasone) tab 60 mg  60 mg Oral Daily with breakfast Tawana Mireles MD   60 mg at 07/10/25 0841    heparin (Porcine) 5000 UNIT/ML injection 5,000 Units  5,000 Units Subcutaneous 2 times per day Nick Johnson DO   5,000 Units at 07/10/25 2204    benzonatate (Tessalon) cap 200 mg  200 mg Oral TID PRN Tawana Mireles MD        guaiFENesin (Robitussin) 100 MG/5 ML oral liquid 200 mg  200 mg Oral Q4H PRN Tawana Mireles MD   200 mg at 07/08/25 0855    sodium chloride (Saline Mist) 0.65 % nasal solution 1 spray  1 spray Each Nare Q3H PRN Tawana Mireles MD        ipratropium-albuterol (Duoneb) 0.5-2.5 (3) MG/3ML inhalation solution 3 mL  3 mL Nebulization Q6H PRN Tawana Mireles MD        ipratropium-albuterol (Duoneb) 0.5-2.5 (3) MG/3ML inhalation solution 3 mL  3 mL Nebulization Q6H WA Tawana Mireles MD   3 mL at 07/10/25 1940    benzocaine-menthol (Cepacol) lozenge 1 lozenge  1 lozenge Oral PRN Tawana Mireles MD   1 lozenge at 07/07/25 0541     No current [facility]-ordered outpatient medications on file.   [5]   Allergies  Allergen Reactions    Citrated Pterylmonoglutamic Acid [Glutamic Acid] UNKNOWN    Dander UNKNOWN    [place] UNKNOWN    Ragweed UNKNOWN    [place] UNKNOWN    Lisinopril RASH   [6]   Family History  Problem Relation Age of Onset    Lung Disorder Mother     Heart Disease Father    [7]   Social History  Socioeconomic History    Marital status:     Tobacco Use    Smoking status: Former     Current packs/day: 0.00     Types: Cigarettes     Quit date: 2008     Years since quittin.5     Passive exposure: Past    Smokeless tobacco: Former   Vaping Use    Vaping status: Never Used   Substance and Sexual Activity    Alcohol use: Yes     Alcohol/week: 3.0 standard drinks of alcohol     Types: 3 Cans of beer per week     Comment: maybe 3 beers a day    Drug use: Never

## 2025-07-11 NOTE — PLAN OF CARE
Bronchoscopy today. Returned in stable condition. Up ad [place].     Problem: Patient Centered Care  Goal: Patient preferences are identified and integrated in the patient's plan of care  Description: Interventions:  - What would you like us to know as we care for you? I am from home and I speak Martiniquais. My niece Anneliese is a Nurse at [employer].  - Provide timely, complete, and accurate information to patient/family  - Incorporate patient and family knowledge, values, beliefs, and cultural backgrounds into the planning and delivery of care  - Encourage patient/family to participate in care and decision-making at the level they choose  - Honor patient and family perspectives and choices  Outcome: Progressing     Problem: Patient/Family Goals  Goal: Patient/Family Long Term Goal  Description: Patient's Long Term Goal: Discharge home     Interventions:  - Monitor vitals  - Monitor labs  - Remote tele  - PRN O2  - PO abx  - Hep gtt  - Onc on consult  - Pulmonology on consult  - See additional Care Plan goals for specific interventions  Outcome: Progressing  Goal: Patient/Family Short Term Goal  Description: Patient's Short Term Goal: Manage SOB    Interventions:   - Monitor vitals  - Monitor labs  - Hep gtt  - Remote tele  - PRN O2  - Onc on consult  - Pulmonology on consult   - Endo on consult   - See additional Care Plan goals for specific interventions  Outcome: Progressing     Problem: RISK FOR INFECTION - ADULT  Goal: Absence of fever/infection during anticipated neutropenic period  Description: INTERVENTIONS  - Monitor WBC  - Administer growth factors as ordered  - Implement neutropenic guidelines  Outcome: Progressing     Problem: SAFETY ADULT - FALL  Goal: Free from fall injury  Description: INTERVENTIONS:  - Assess pt frequently for physical needs  - Identify cognitive and physical deficits and behaviors that affect risk of falls.  - [place] fall precautions as indicated by assessment.  - Educate pt/family on patient  safety including physical limitations  - Instruct pt to call for assistance with activity based on assessment  - Modify environment to reduce risk of injury  - Provide assistive devices as appropriate  - Consider OT/PT consult to assist with strengthening/mobility  - Encourage toileting schedule  Outcome: Progressing     Problem: RESPIRATORY - ADULT  Goal: Achieves optimal ventilation and oxygenation  Description: INTERVENTIONS:  - Assess for changes in respiratory status  - Assess for changes in mentation and behavior  - Position to facilitate oxygenation and minimize respiratory effort  - Oxygen supplementation based on oxygen saturation or ABGs  - Provide Smoking Cessation handout, if applicable  - Encourage broncho-pulmonary hygiene including cough, deep breathe, Incentive Spirometry  - Assess the need for suctioning and perform as needed  - Assess and instruct to report SOB or any respiratory difficulty  - Respiratory Therapy support as indicated  - Manage/alleviate anxiety  - Monitor for signs/symptoms of CO2 retention  Outcome: Progressing     Problem: PAIN - ADULT  Goal: Verbalizes/displays adequate comfort level or patient's stated pain goal  Description: INTERVENTIONS:  - Encourage pt to monitor pain and request assistance  - Assess pain using appropriate pain scale  - Administer analgesics based on type and severity of pain and evaluate response  - Implement non-pharmacological measures as appropriate and evaluate response  - Consider cultural and social influences on pain and pain management  - Manage/alleviate anxiety  - Utilize distraction and/or relaxation techniques  - Monitor for opioid side effects  - Notify MD/LIP if interventions unsuccessful or patient reports new pain  - Anticipate increased pain with activity and pre-medicate as appropriate  Outcome: Progressing     Problem: DISCHARGE PLANNING  Goal: Discharge to home or other facility with appropriate resources  Description: INTERVENTIONS:  -  Identify barriers to discharge w/pt and caregiver  - Include patient/family/discharge partner in discharge planning  - Arrange for needed discharge resources and transportation as appropriate  - Identify discharge learning needs (meds, wound care, etc)  - Arrange for interpreters to assist at discharge as needed  - Consider post-discharge preferences of patient/family/discharge partner  - Complete POLST form as appropriate  - Assess patient's ability to be responsible for managing their own health  - Refer to Case Management Department for coordinating discharge planning if the patient needs post-hospital services based on physician/LIP order or complex needs related to functional status, cognitive ability or social support system  Outcome: Progressing     Problem: Altered Communication/Language Barrier  Goal: Patient/Family is able to understand and participate in their care  Description: Interventions:  - Assess communication ability and preferred communication style  - Implement communication aides and strategies  - Use visual cues when possible  - Listen attentively, be patient, do not interrupt  - Minimize distractions  - Allow time for understanding and response  - Establish method for patient to ask for assistance (call light)  - Provide an  as needed  - Communicate barriers and strategies to overcome with those who interact with patient  Outcome: Progressing     Problem: GASTROINTESTINAL - ADULT  Goal: Maintains or returns to baseline bowel function  Description: INTERVENTIONS:  - Assess bowel function  - Maintain adequate hydration with IV or PO as ordered and tolerated  - Evaluate effectiveness of GI medications  - Encourage mobilization and activity  - Obtain nutritional consult as needed  - Establish a toileting routine/schedule  - Consider collaborating with pharmacy to review patient's medication profile  Outcome: Progressing     Problem: CARDIOVASCULAR - ADULT  Goal: Maintains optimal  cardiac output and hemodynamic stability  Description: INTERVENTIONS:  - Monitor vital signs, rhythm, and trends  - Monitor for bleeding, hypotension and signs of decreased cardiac output  - Evaluate effectiveness of vasoactive medications to optimize hemodynamic stability  - Monitor arterial and/or venous puncture sites for bleeding and/or hematoma  - Assess quality of pulses, skin color and temperature  - Assess for signs of decreased coronary artery perfusion - ex. Angina  - Evaluate fluid balance, assess for edema, trend weights  Outcome: Progressing  Goal: Absence of cardiac arrhythmias or at baseline  Description: INTERVENTIONS:  - Continuous cardiac monitoring, monitor vital signs, obtain 12 lead EKG if indicated  - Evaluate effectiveness of antiarrhythmic and heart rate control medications as ordered  - Initiate emergency measures for life threatening arrhythmias  - Monitor electrolytes and administer replacement therapy as ordered  Outcome: Progressing     Problem: METABOLIC/FLUID AND ELECTROLYTES - ADULT  Goal: Electrolytes maintained within normal limits  Description: INTERVENTIONS:  - Monitor labs and rhythm and assess patient for signs and symptoms of electrolyte imbalances  - Administer electrolyte replacement as ordered  - Monitor response to electrolyte replacements, including rhythm and repeat lab results as appropriate  - Fluid restriction as ordered  - Instruct patient on fluid and nutrition restrictions as appropriate  Outcome: Progressing  Goal: Hemodynamic stability and optimal renal function maintained  Description: INTERVENTIONS:  - Monitor labs and assess for signs and symptoms of volume excess or deficit  - Monitor intake, output and patient weight  - Monitor urine specific gravity, serum osmolarity and serum sodium as indicated or ordered  - Monitor response to interventions for patient's volume status, including labs, urine output, blood pressure (other measures as available)  - Encourage  oral intake as appropriate  - Instruct patient on fluid and nutrition restrictions as appropriate  Outcome: Progressing     Problem: SKIN/TISSUE INTEGRITY - ADULT  Goal: Skin integrity remains intact  Description: INTERVENTIONS  - Assess and document risk factors for pressure ulcer development  - Assess and document skin integrity  - Monitor for areas of redness and/or skin breakdown  - Initiate interventions, skin care algorithm/standards of care as needed  Outcome: Progressing  Goal: Oral mucous membranes remain intact  Description: INTERVENTIONS  - Assess oral mucosa and hygiene practices  - Implement preventative oral hygiene regimen  - Implement oral medicated treatments as ordered  Outcome: Progressing     Problem: HEMATOLOGIC - ADULT  Goal: Maintains hematologic stability  Description: INTERVENTIONS  - Assess for signs and symptoms of bleeding or hemorrhage  - Monitor labs and vital signs for trends  - Administer supportive blood products/factors, fluids and medications as ordered and appropriate  - Administer supportive blood products/factors as ordered and appropriate  Outcome: Progressing  Goal: Free from bleeding injury  Description: (Example usage: patient with low platelets)  INTERVENTIONS:  - Avoid intramuscular injections, enemas and rectal medication administration  - Ensure safe mobilization of patient  - Hold pressure on venipuncture sites to achieve adequate hemostasis  - Assess for signs and symptoms of internal bleeding  - Monitor lab trends  - Patient is to report abnormal signs of bleeding to staff  - Avoid use of toothpicks and dental floss  - Use electric shaver for shaving  - Use soft bristle tooth brush  - Limit straining and forceful nose blowing  Outcome: Progressing

## 2025-07-11 NOTE — BRIEF PROCEDURE NOTE
EBUS Bronchoscopy Procedure Note:        Date of procedure:  25       Procedure performed:  EBUS bronchoscopy with TBNA of lymph nodes        Indication for procedure/Pre-op Diagnosis:  Mediastinal lymphadenopathy and pulmonary infiltrates    Post-op Diagnosis:  Mediastinal lymphadenopathy and pulmonary infiltrates    Informed consent for Procedure:   This procedure has been fully reviewed with the patient and written informed consent has been obtained.  Pt was allowed to ask questions and all were answered.  Alternative diagnostic and therapeutic options discussed. After reviewing the risks and benefits, patient was deemed in satisfactory condition to undergo the procedure.  Informed consent for general anesthesia was signed by the patient. Time-out was completed verifying correct patient, procedure, site, positioning, and implant(s) or special equipment if applicable.  Anesthesia: General anesthesia  Instruments Used:  1. Olympus therapeutic videobronchoscope  2. Olympus EBUS bronchoscope     Intraoperative Monitorin. EKG  2. Pulse O2 saturation  3. Blood pressure  4. Respiratory rate  5. Level of consciousness  6. End-tidal CO2     Description of procedure and Findings:  Initial airway examination was performed using the video bronchoscope introduced through the 8.0 ETT and advanced to the trachea.  Trachea was of normal caliber.  Bronchoscope was then advanced toward sendy and sendy appeared sharp.  The right lung tracheobronchial tree was examined to the first subsegmental level and there were no endobronchial lesions, bleeding. There were min mucus plugs which were cleared using suctioning via the bronchoscope.  The left tracheobronchial tree was examined to the first subsegmental level and there were no endobronchial lesions, bleeding. There were min mucus plugs which were cleared using suctioning via the bronchoscope  Next a right lower lobe bronchoalveolar lavage was performed by instilling 60  ml of sterile saline into the right lower lobe segments and aspirating and collecting 20 ml of return into a luken trap.  Video bronchoscope was subsequently removed and EBUS scope was then inserted through the ETT.     EBUS lymph node survey revealed the followin. Left interlobar (11L) LN measures 0.7 cm in short axis diameter.  2. Left hilar (10L) LN measures 0.7 cm in short axis diameter.  3. Left low paratracheal (4L) LN measures 0.8 cm in short axis diameter.  4. Subcarinal (7) LN measures 1.2 cm in short axis diameter.  5. Right low paratracheal (4R) LN measures 1.5 cm in short axis diameter.  6. Right hilar (10R) LN measures 0.8 cm in short axis diameter.  7. Right interlobar (11R) LN measures 0.8 cm in short axis diameter.     Next a 21 g EBUS FNA needle was used to obtain multiple lymph node biopsies from stations 7 and 4R and sent for cytological evaluation, microbiological evaluation, and flow cytometry.  There was minimal airway bleeding which was self contained. The airways were aspirated clear. The bronchoscope was withdrawn and the patient awoken from anesthesia, extubated, and transported to the PACU in stable condition.  Specimens:     EBUS TBNA of the 7 and 4R lymph node stations.  BAL of the right lower lobe was done and sent for microbiology..     Complications: none immediate  Estimated Blood Loss: minimal  Recommendations and plans:   Await test results.      LIBRADO Johnson DO, MPH  Pulmonary and Critical Care Medicine  [facility]

## 2025-07-11 NOTE — PLAN OF CARE
Patient is alert and oriented x4. NPO for procedure. Up independently.  Family at bedside. Call light within reach.    Problem: Patient Centered Care  Goal: Patient preferences are identified and integrated in the patient's plan of care  Description: Interventions:  - What would you like us to know as we care for you?   - Provide timely, complete, and accurate information to patient/family  - Incorporate patient and family knowledge, values, beliefs, and cultural backgrounds into the planning and delivery of care  - Encourage patient/family to participate in care and decision-making at the level they choose  - Honor patient and family perspectives and choices  Outcome: Progressing     Problem: Patient/Family Goals  Goal: Patient/Family Long Term Goal  Description: Patient's Long Term Goal:     Interventions:  -   - See additional Care Plan goals for specific interventions  Outcome: Progressing  Goal: Patient/Family Short Term Goal  Description: Patient's Short Term Goal:     Interventions:   -   - See additional Care Plan goals for specific interventions  Outcome: Progressing     Problem: RISK FOR INFECTION - ADULT  Goal: Absence of fever/infection during anticipated neutropenic period  Description: INTERVENTIONS  - Monitor WBC  - Administer growth factors as ordered  - Implement neutropenic guidelines  Outcome: Progressing     Problem: SAFETY ADULT - FALL  Goal: Free from fall injury  Description: INTERVENTIONS:  - Assess pt frequently for physical needs  - Identify cognitive and physical deficits and behaviors that affect risk of falls.  - [place] fall precautions as indicated by assessment.  - Educate pt/family on patient safety including physical limitations  - Instruct pt to call for assistance with activity based on assessment  - Modify environment to reduce risk of injury  - Provide assistive devices as appropriate  - Consider OT/PT consult to assist with strengthening/mobility  - Encourage toileting  schedule  Outcome: Progressing     Problem: RESPIRATORY - ADULT  Goal: Achieves optimal ventilation and oxygenation  Description: INTERVENTIONS:  - Assess for changes in respiratory status  - Assess for changes in mentation and behavior  - Position to facilitate oxygenation and minimize respiratory effort  - Oxygen supplementation based on oxygen saturation or ABGs  - Provide Smoking Cessation handout, if applicable  - Encourage broncho-pulmonary hygiene including cough, deep breathe, Incentive Spirometry  - Assess the need for suctioning and perform as needed  - Assess and instruct to report SOB or any respiratory difficulty  - Respiratory Therapy support as indicated  - Manage/alleviate anxiety  - Monitor for signs/symptoms of CO2 retention  Outcome: Progressing     Problem: PAIN - ADULT  Goal: Verbalizes/displays adequate comfort level or patient's stated pain goal  Description: INTERVENTIONS:  - Encourage pt to monitor pain and request assistance  - Assess pain using appropriate pain scale  - Administer analgesics based on type and severity of pain and evaluate response  - Implement non-pharmacological measures as appropriate and evaluate response  - Consider cultural and social influences on pain and pain management  - Manage/alleviate anxiety  - Utilize distraction and/or relaxation techniques  - Monitor for opioid side effects  - Notify MD/LIP if interventions unsuccessful or patient reports new pain  - Anticipate increased pain with activity and pre-medicate as appropriate  Outcome: Progressing     Problem: DISCHARGE PLANNING  Goal: Discharge to home or other facility with appropriate resources  Description: INTERVENTIONS:  - Identify barriers to discharge w/pt and caregiver  - Include patient/family/discharge partner in discharge planning  - Arrange for needed discharge resources and transportation as appropriate  - Identify discharge learning needs (meds, wound care, etc)  - Arrange for interpreters to  assist at discharge as needed  - Consider post-discharge preferences of patient/family/discharge partner  - Complete POLST form as appropriate  - Assess patient's ability to be responsible for managing their own health  - Refer to Case Management Department for coordinating discharge planning if the patient needs post-hospital services based on physician/LIP order or complex needs related to functional status, cognitive ability or social support system  Outcome: Progressing     Problem: Altered Communication/Language Barrier  Goal: Patient/Family is able to understand and participate in their care  Description: Interventions:  - Assess communication ability and preferred communication style  - Implement communication aides and strategies  - Use visual cues when possible  - Listen attentively, be patient, do not interrupt  - Minimize distractions  - Allow time for understanding and response  - Establish method for patient to ask for assistance (call light)  - Provide an  as needed  - Communicate barriers and strategies to overcome with those who interact with patient  Outcome: Progressing     Problem: GASTROINTESTINAL - ADULT  Goal: Maintains or returns to baseline bowel function  Description: INTERVENTIONS:  - Assess bowel function  - Maintain adequate hydration with IV or PO as ordered and tolerated  - Evaluate effectiveness of GI medications  - Encourage mobilization and activity  - Obtain nutritional consult as needed  - Establish a toileting routine/schedule  - Consider collaborating with pharmacy to review patient's medication profile  Outcome: Progressing

## 2025-07-11 NOTE — ANESTHESIA PROCEDURE NOTES
Airway  Date/Time: 7/11/2025 7:49 AM  Reason: elective    Airway not difficult    General Information and Staff   Patient location during procedure: endo  Anesthesiologist: John Fung MD  Performed: anesthesiologist   Performed by: John Fung MD  Authorized by: John Fung MD        Indications and Patient Condition  Indications for airway management: anesthesia     Preoxygenated: yesPatient position: sniffing    Mask difficulty assessment: 2 - vent by mask + OA or adjuvant +/- NMBA    Final Airway Details    Final airway type: endotracheal airway    Successful airway: ETT  Cuffed: yes   Successful intubation technique: direct laryngoscopy  Facilitating devices/methods: intubating stylet  ETT size (mm): 8.0    Cormack-Lehane Classification: grade IIA - partial view of glottis  Placement verified by: capnometry   Measured from: lips  ETT to lips (cm): 22  Number of attempts at approach: 1

## 2025-07-12 VITALS
TEMPERATURE: 98 F | OXYGEN SATURATION: 92 % | HEART RATE: 71 BPM | DIASTOLIC BLOOD PRESSURE: 68 MMHG | BODY MASS INDEX: 31 KG/M2 | RESPIRATION RATE: 16 BRPM | SYSTOLIC BLOOD PRESSURE: 129 MMHG | WEIGHT: 186 LBS

## 2025-07-12 LAB
ALBUMIN SERPL-MCNC: 3 G/DL (ref 3.2–4.8)
ALBUMIN/GLOB SERPL: 0.6 {RATIO} (ref 1–2)
ALP LIVER SERPL-CCNC: 282 U/L (ref 45–117)
ALT SERPL-CCNC: 109 U/L (ref 10–49)
ANION GAP SERPL CALC-SCNC: 7 MMOL/L (ref 0–18)
AST SERPL-CCNC: 46 U/L (ref ?–34)
BASOPHILS # BLD: 0 X10(3) UL (ref 0–0.2)
BASOPHILS NFR BLD: 0 %
BILIRUB SERPL-MCNC: 0.4 MG/DL (ref 0.2–1.1)
BUN BLD-MCNC: 12 MG/DL (ref 9–23)
BUN/CREAT SERPL: 17.9 (ref 10–20)
CALCIUM BLD-MCNC: 9.3 MG/DL (ref 8.7–10.4)
CHLORIDE SERPL-SCNC: 102 MMOL/L (ref 98–112)
CO2 SERPL-SCNC: 25 MMOL/L (ref 21–32)
CREAT BLD-MCNC: 0.67 MG/DL (ref 0.7–1.3)
DEPRECATED RDW RBC AUTO: 53.1 FL (ref 35.1–46.3)
EGFRCR SERPLBLD CKD-EPI 2021: 105 ML/MIN/1.73M2 (ref 60–?)
EOSINOPHIL # BLD: 0 X10(3) UL (ref 0–0.7)
EOSINOPHIL NFR BLD: 0 %
ERYTHROCYTE [DISTWIDTH] IN BLOOD BY AUTOMATED COUNT: 15.5 % (ref 11–15)
GLOBULIN PLAS-MCNC: 4.8 G/DL (ref 2–3.5)
GLUCOSE BLD-MCNC: 96 MG/DL (ref 70–99)
HCT VFR BLD AUTO: 33.9 % (ref 39–53)
HGB BLD-MCNC: 10.3 G/DL (ref 13–17.5)
LYMPHOCYTES NFR BLD: 2.37 X10(3) UL (ref 1–4)
LYMPHOCYTES NFR BLD: 23 %
MAGNESIUM SERPL-MCNC: 1.9 MG/DL (ref 1.6–2.6)
MCH RBC QN AUTO: 29.2 PG (ref 26–34)
MCHC RBC AUTO-ENTMCNC: 30.4 G/DL (ref 31–37)
MCV RBC AUTO: 96 FL (ref 80–100)
METAMYELOCYTES # BLD: 0.41 X10(3) UL (ref ?–0.01)
METAMYELOCYTES NFR BLD: 4 %
MONOCYTES # BLD: 0.62 X10(3) UL (ref 0.1–1)
MONOCYTES NFR BLD: 6 %
MORPHOLOGY: NORMAL
MYELOCYTES # BLD: 0.21 X10(3) UL (ref ?–0.01)
MYELOCYTES NFR BLD: 2 %
NEUTROPHILS # BLD AUTO: 6.15 X10 (3) UL (ref 1.5–7.7)
NEUTROPHILS NFR BLD: 64 %
NEUTS BAND NFR BLD: 1 %
NEUTS HYPERSEG # BLD: 6.7 X10(3) UL (ref 1.5–7.7)
OSMOLALITY SERPL CALC.SUM OF ELEC: 278 MOSM/KG (ref 275–295)
PHOSPHATE SERPL-MCNC: 2.8 MG/DL (ref 2.4–5.1)
PLATELET # BLD AUTO: 314 10(3)UL (ref 150–450)
PLATELET MORPHOLOGY: NORMAL
POTASSIUM SERPL-SCNC: 3.6 MMOL/L (ref 3.5–5.1)
PROT SERPL-MCNC: 7.8 G/DL (ref 5.7–8.2)
PTH-RELATED PEPTIDE: <2 PMOL/L
PTH-RELATED PEPTIDE: <2 PMOL/L
RBC # BLD AUTO: 3.53 X10(6)UL (ref 4.3–5.7)
SODIUM SERPL-SCNC: 134 MMOL/L (ref 136–145)
TOTAL CELLS COUNTED BLD: 100
WBC # BLD AUTO: 10.3 X10(3) UL (ref 4–11)

## 2025-07-12 PROCEDURE — 99233 SBSQ HOSP IP/OBS HIGH 50: CPT | Performed by: INTERNAL MEDICINE

## 2025-07-12 RX ORDER — PREDNISONE 20 MG/1
40 TABLET ORAL
Status: DISCONTINUED | OUTPATIENT
Start: 2025-07-13 | End: 2025-07-12

## 2025-07-12 RX ORDER — PREDNISONE 20 MG/1
40 TABLET ORAL
Qty: 10 TABLET | Refills: 0 | Status: SHIPPED | OUTPATIENT
Start: 2025-07-13 | End: 2025-07-18

## 2025-07-12 RX ORDER — POTASSIUM CHLORIDE 1500 MG/1
40 TABLET, EXTENDED RELEASE ORAL EVERY 4 HOURS
Status: DISCONTINUED | OUTPATIENT
Start: 2025-07-12 | End: 2025-07-12

## 2025-07-12 NOTE — PLAN OF CARE
Discharge orders in place. Patient to be sent with oral abx and prednisone. AVS reviewed with family and patient. IV removed. Tele discontinued. All questions met at time of discharge.    Problem: Patient Centered Care  Goal: Patient preferences are identified and integrated in the patient's plan of care  Description: Interventions:  - What would you like us to know as we care for you? I am from home and I speak [place]. My niece Anneliese is a Nurse at [employer].  - Provide timely, complete, and accurate information to patient/family  - Incorporate patient and family knowledge, values, beliefs, and cultural backgrounds into the planning and delivery of care  - Encourage patient/family to participate in care and decision-making at the level they choose  - Honor patient and family perspectives and choices  Outcome: Progressing     Problem: Patient/Family Goals  Goal: Patient/Family Long Term Goal  Description: Patient's Long Term Goal: Discharge home     Interventions:  - Monitor vitals  - Monitor labs  - Remote tele  - PRN O2  - PO abx  - Hep gtt  - Onc on consult  - Pulmonology on consult  - See additional Care Plan goals for specific interventions  Outcome: Progressing  Goal: Patient/Family Short Term Goal  Description: Patient's Short Term Goal: Manage SOB    Interventions:   - Monitor vitals  - Monitor labs  - Hep gtt  - Remote tele  - PRN O2  - Onc on consult  - Pulmonology on consult   - Endo on consult   - See additional Care Plan goals for specific interventions  Outcome: Progressing     Problem: RISK FOR INFECTION - ADULT  Goal: Absence of fever/infection during anticipated neutropenic period  Description: INTERVENTIONS  - Monitor WBC  - Administer growth factors as ordered  - Implement neutropenic guidelines  Outcome: Progressing     Problem: SAFETY ADULT - FALL  Goal: Free from fall injury  Description: INTERVENTIONS:  - Assess pt frequently for physical needs  - Identify cognitive and physical deficits and  behaviors that affect risk of falls.  - [place] fall precautions as indicated by assessment.  - Educate pt/family on patient safety including physical limitations  - Instruct pt to call for assistance with activity based on assessment  - Modify environment to reduce risk of injury  - Provide assistive devices as appropriate  - Consider OT/PT consult to assist with strengthening/mobility  - Encourage toileting schedule  Outcome: Progressing     Problem: RESPIRATORY - ADULT  Goal: Achieves optimal ventilation and oxygenation  Description: INTERVENTIONS:  - Assess for changes in respiratory status  - Assess for changes in mentation and behavior  - Position to facilitate oxygenation and minimize respiratory effort  - Oxygen supplementation based on oxygen saturation or ABGs  - Provide Smoking Cessation handout, if applicable  - Encourage broncho-pulmonary hygiene including cough, deep breathe, Incentive Spirometry  - Assess the need for suctioning and perform as needed  - Assess and instruct to report SOB or any respiratory difficulty  - Respiratory Therapy support as indicated  - Manage/alleviate anxiety  - Monitor for signs/symptoms of CO2 retention  Outcome: Progressing     Problem: PAIN - ADULT  Goal: Verbalizes/displays adequate comfort level or patient's stated pain goal  Description: INTERVENTIONS:  - Encourage pt to monitor pain and request assistance  - Assess pain using appropriate pain scale  - Administer analgesics based on type and severity of pain and evaluate response  - Implement non-pharmacological measures as appropriate and evaluate response  - Consider cultural and social influences on pain and pain management  - Manage/alleviate anxiety  - Utilize distraction and/or relaxation techniques  - Monitor for opioid side effects  - Notify MD/LIP if interventions unsuccessful or patient reports new pain  - Anticipate increased pain with activity and pre-medicate as appropriate  Outcome: Progressing      Problem: DISCHARGE PLANNING  Goal: Discharge to home or other facility with appropriate resources  Description: INTERVENTIONS:  - Identify barriers to discharge w/pt and caregiver  - Include patient/family/discharge partner in discharge planning  - Arrange for needed discharge resources and transportation as appropriate  - Identify discharge learning needs (meds, wound care, etc)  - Arrange for interpreters to assist at discharge as needed  - Consider post-discharge preferences of patient/family/discharge partner  - Complete POLST form as appropriate  - Assess patient's ability to be responsible for managing their own health  - Refer to Case Management Department for coordinating discharge planning if the patient needs post-hospital services based on physician/LIP order or complex needs related to functional status, cognitive ability or social support system  Outcome: Progressing     Problem: Altered Communication/Language Barrier  Goal: Patient/Family is able to understand and participate in their care  Description: Interventions:  - Assess communication ability and preferred communication style  - Implement communication aides and strategies  - Use visual cues when possible  - Listen attentively, be patient, do not interrupt  - Minimize distractions  - Allow time for understanding and response  - Establish method for patient to ask for assistance (call light)  - Provide an  as needed  - Communicate barriers and strategies to overcome with those who interact with patient  Outcome: Progressing     Problem: GASTROINTESTINAL - ADULT  Goal: Maintains or returns to baseline bowel function  Description: INTERVENTIONS:  - Assess bowel function  - Maintain adequate hydration with IV or PO as ordered and tolerated  - Evaluate effectiveness of GI medications  - Encourage mobilization and activity  - Obtain nutritional consult as needed  - Establish a toileting routine/schedule  - Consider collaborating with  pharmacy to review patient's medication profile  Outcome: Progressing     Problem: CARDIOVASCULAR - ADULT  Goal: Maintains optimal cardiac output and hemodynamic stability  Description: INTERVENTIONS:  - Monitor vital signs, rhythm, and trends  - Monitor for bleeding, hypotension and signs of decreased cardiac output  - Evaluate effectiveness of vasoactive medications to optimize hemodynamic stability  - Monitor arterial and/or venous puncture sites for bleeding and/or hematoma  - Assess quality of pulses, skin color and temperature  - Assess for signs of decreased coronary artery perfusion - ex. Angina  - Evaluate fluid balance, assess for edema, trend weights  Outcome: Progressing  Goal: Absence of cardiac arrhythmias or at baseline  Description: INTERVENTIONS:  - Continuous cardiac monitoring, monitor vital signs, obtain 12 lead EKG if indicated  - Evaluate effectiveness of antiarrhythmic and heart rate control medications as ordered  - Initiate emergency measures for life threatening arrhythmias  - Monitor electrolytes and administer replacement therapy as ordered  Outcome: Progressing     Problem: METABOLIC/FLUID AND ELECTROLYTES - ADULT  Goal: Electrolytes maintained within normal limits  Description: INTERVENTIONS:  - Monitor labs and rhythm and assess patient for signs and symptoms of electrolyte imbalances  - Administer electrolyte replacement as ordered  - Monitor response to electrolyte replacements, including rhythm and repeat lab results as appropriate  - Fluid restriction as ordered  - Instruct patient on fluid and nutrition restrictions as appropriate  Outcome: Progressing  Goal: Hemodynamic stability and optimal renal function maintained  Description: INTERVENTIONS:  - Monitor labs and assess for signs and symptoms of volume excess or deficit  - Monitor intake, output and patient weight  - Monitor urine specific gravity, serum osmolarity and serum sodium as indicated or ordered  - Monitor response to  interventions for patient's volume status, including labs, urine output, blood pressure (other measures as available)  - Encourage oral intake as appropriate  - Instruct patient on fluid and nutrition restrictions as appropriate  Outcome: Progressing     Problem: SKIN/TISSUE INTEGRITY - ADULT  Goal: Skin integrity remains intact  Description: INTERVENTIONS  - Assess and document risk factors for pressure ulcer development  - Assess and document skin integrity  - Monitor for areas of redness and/or skin breakdown  - Initiate interventions, skin care algorithm/standards of care as needed  Outcome: Progressing  Goal: Oral mucous membranes remain intact  Description: INTERVENTIONS  - Assess oral mucosa and hygiene practices  - Implement preventative oral hygiene regimen  - Implement oral medicated treatments as ordered  Outcome: Progressing     Problem: HEMATOLOGIC - ADULT  Goal: Maintains hematologic stability  Description: INTERVENTIONS  - Assess for signs and symptoms of bleeding or hemorrhage  - Monitor labs and vital signs for trends  - Administer supportive blood products/factors, fluids and medications as ordered and appropriate  - Administer supportive blood products/factors as ordered and appropriate  Outcome: Progressing  Goal: Free from bleeding injury  Description: (Example usage: patient with low platelets)  INTERVENTIONS:  - Avoid intramuscular injections, enemas and rectal medication administration  - Ensure safe mobilization of patient  - Hold pressure on venipuncture sites to achieve adequate hemostasis  - Assess for signs and symptoms of internal bleeding  - Monitor lab trends  - Patient is to report abnormal signs of bleeding to staff  - Avoid use of toothpicks and dental floss  - Use electric shaver for shaving  - Use soft bristle tooth brush  - Limit straining and forceful nose blowing  Outcome: Progressing

## 2025-07-12 NOTE — PROGRESS NOTES
Pulmonary Medicine Inpatient Progress Note             Consult requested by Dr. Mireles for PNA.        Subjective:  On RA  S/p bronchoscopy with EBUS and BAL on 7/11/25.  Feels well     From the initial consultation  HPI: 65 yo male with hx of recent allergic reaction requiring steroids, HTN now admitted with coughing, shortness of breath, and fevers x 3 days.   No sick contacts.   Denies previous hx of pulmonary problems. No hx of asthma, COPD, recurrent PNAs  CXR concerning for PNA  Started on abx-unasyn/azithro along with nebs and solumedrol  Pt is afebrile on 4 LNC supp 02      REVIEW OF SYSTEMS:  Positives and negatives as stated in HPI. Remainder of 12 pt review of systems otherwise are negative.     PAST MEDICAL HISTORY:  Past Medical History[1]     PAST SURGICAL HISTORY:  Past Surgical History[2]     PAST FAMILY HISTORY:  Family History[3]     PAST SOCIAL HISTORY:  Social Hx on file[4]  Quit smoking 18 yrs. 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 No OP exudates  NECK/THROAT: no JVD. Trachea midline. No obvious thyromegaly  LUNG: clear no wheezing, + basilar crackles. Chest symmetric with respiratory motion  HEART: regular rate and rhythm, no obvious murmers or gallops note  ABD: soft non tender. + bowel sounds. No organomegaly noted  EXT: no clubbing, cyanosis. + trace b/l LE edema       IMAGES:  US chest 7/9/25  Small volume pleural effusion.  Thoracentesis was deferred.     Chest CT scan 7/7/25  CONCLUSION  1.  Marked mediastinal lymphadenopathy. Coexistent splenomegaly and gastrohepatic ligament lymphadenopathy. Differential:lymphoma, metastases, and less likely sarcoid or benign etiology.   2.  A 16 mm ill-defined anterior right upper lobe pulmonary nodule-differential includes primary malignancy, metastatic disease, and less likely sarcoid or infection.   3.  Right greater than left basilar pleural effusion with mild pulmonary edema.   4.  Multifocal peripheral consolidations with nodularity differential includes atypical infectious process, and malignancy.     CXR 7/6/25  CONCLUSION: Multifocal bilateral patchy opacities may represent pneumonia or edema.       LABS:  Recent Labs   Lab 07/10/25  0651 07/11/25  0700 07/12/25  0748   RBC 3.66* 4.09* 3.53*   HGB 10.4* 11.5* 10.3*   HCT 32.3* 35.8* 33.9*   MCV 88.3 87.5 96.0   MCH 28.4 28.1 29.2   MCHC 32.2 32.1 30.4*   RDW 15.5* 15.4* 15.5*   NEPRELIM 4.59 5.80 6.15   WBC 8.6 11.2* 10.3   .0 351.0 314.0       Recent Labs   Lab 07/10/25  0651 07/10/25  1527 07/11/25  0700 07/12/25  0748   GLU 87  --  71 96   BUN 17  --  15 12   CREATSERUM 0.71  --  0.67* 0.67*   EGFRCR 103  --  105 105   CA 9.5  --  9.4 9.3   ALB 2.7*  --  3.0* 3.0*     --  137 134*   K 3.6  3.6 4.6 3.9 3.6     --  104 102   CO2 28.0  --  26.0 25.0   ALKPHO 297*  --  291* 282*   AST 45*  --  46* 46*   ALT 81*  --  108* 109*   BILT 0.3  --  0.4 0.4   TP 7.4  --  8.0 7.8       ASSESSMENT/PLAN:  Multifocal PNA  -CXR with b/l infiltrates  -started on empiric unasyn/azithro. recommend switch to course of augmentin BID to complete 7 days total of abx  -urine leg neg   -s/p BAL-NGTD  -cont supp 02-currently weaned to RA  -nebs  -can continue short course steroids. Solumedrol switched to prednisone 60-can change to 40 mg x 5 days from pulm standpt  -chest CT concerning for pulmonary infiltrates, lung nodule, and bulky  mediastinal lymphadenopathy. There is a small right pleural effusion  -attempted bedside US guided thoracentesis but pleural effusion is small with lung in the middle of the way obstructing ability to safely perform thoracentesis  -s/p bronchoscopy with EBUS and BAL on 25  -f/u on path and micro results from bronch    Elevated LFTS  -abd US showed steatosis and hepatomegaly; also shows GB adenomyomatosis, small gallstones    Recent allergic reaction  -s/p tx with steroids last month  -previously saw allergy and ENT    Proph  -DVT: hep subcutaneous    Dispo  -full code  -should f/u in pulm clinic in 1 week. Pt given our clinic info on card  -ok discharge from pulmonary standpt     Thank you for the opportunity to care for Karel Ford.       LIBRADO Johnson DO, MPH  Pulmonary Critical Care Medicine  [facility] Pulmonary and Critical Care Medicine                [1]   Past Medical History:  Diagnosis Date    Essential hypertension 2023    Hyperlipidemia    [2] History reviewed. No pertinent surgical history.  [3]   Family History  Problem Relation Age of Onset    Lung Disorder Mother     Heart Disease Father    [4]   Social History  Socioeconomic History    Marital status:    Tobacco Use    Smoking status: Former     Current packs/day: 0.00     Types: Cigarettes     Quit date: 2008     Years since quittin.5     Passive exposure: Past    Smokeless tobacco: Former   Vaping Use    Vaping status: Never Used   Substance and Sexual Activity    Alcohol use: Yes     Alcohol/week: 3.0 standard drinks of alcohol     Types: 3 Cans of beer per week     Comment: maybe 3 beers a day    Drug use: Never   [5]   Allergies  Allergen Reactions    Citrated Pterylmonoglutamic Acid [Glutamic Acid] UNKNOWN    Dander UNKNOWN    [place] UNKNOWN    Ragweed UNKNOWN    [place] UNKNOWN    Lisinopril RASH   [6]   Outpatient Medications Marked as Taking for the 25 encounter ([facility]  Encounter)   Medication Sig Dispense Refill    diphenhydrAMINE (BANOPHEN) 25 MG Oral Cap Take 1 capsule (25 mg total) by mouth every 6 (six) hours as needed for Itching or Allergies.      [] triamcinolone 0.5 % External Cream Apply 1 Application topically 2 (two) times daily for 10 days. Apply twice daily for 7-10 days. Do NOT use on face or in folds of skin. 15 g 3    amLODIPine 10 MG Oral Tab Take 1 tablet (10 mg total) by mouth daily. 90 tablet 3    rosuvastatin 10 MG Oral Tab Take 1 tablet (10 mg total) by mouth nightly. FOR CHOLESTEROL. 90 tablet 3    EPINEPHrine (EPIPEN 2-ROSENDA) 0.3 MG/0.3ML Injection Solution Auto-injector Inject IM in event of  allergic reaction 1 each 0    levocetirizine 5 MG Oral Tab Take 1 tablet (5 mg total) by mouth in the morning and 1 tablet (5 mg total) before bedtime. 180 tablet 1    albuterol (VENTOLIN HFA) 108 (90 Base) MCG/ACT Inhalation Aero Soln Inhale 2 puffs into the lungs every 4 (four) hours as needed for Wheezing. 1 each 0   [7]    predniSONE  60 mg Oral Daily with breakfast    heparin  5,000 Units Subcutaneous 2 times per day    ipratropium-albuterol  3 mL Nebulization Q6H WA   [8]    lactated ringers Stopped (25 1006)   [9]   benzonatate    guaiFENesin    sodium chloride    ipratropium-albuterol    benzocaine-menthol

## 2025-07-12 NOTE — DISCHARGE SUMMARY
[facility]  part of [facility]    DISCHARGE SUMMARY     Karel Ford Patient Status:  Inpatient    10/6/1960 MRN [id number]   Location [facility] 4W/SW/SE Attending Aster Issa MD   Hosp Day # 6 PCP Martin Adams MD     Date of Admission: 2025  Date of Discharge:  25      Discharge Disposition: Home or Self Care    Discharge Diagnosis:     Multifocal pneumonia, possible gram neg pneumonia  Lymphadenopathy and splenomegaly  AECOPD  Hypercalcemia 2/2 vit D toxicity - resolved  Acute kidney injury   Acute transaminitis  DM  HTN  HL    History of Present Illness:     Karel Ford is a(n) 64 year old male with a PMH of HTN, recent Er visit for allergic reaction s/p medrol dose pack who now presents with cough, fevers and sob since Friday.  He denies any chest pain.  No sick contacts.  No n/v/abd pain.  In the ED CXR showed multifocal pneumonia.  He was also found to be hypoxic on exam.  Improved with NC.  He will be admitted.     Brief Synopsis:     Multifocal pneumonia, possible gram neg pneumonia  Lymphadenopathy and splenomegaly  - imaging reviewed  - started broad spectrum IV abx on admission  - await urine legionella - neg  - sputum culture contaminated  - blood cxs showing NGTD  - pulm consulted  - bedside thoracentesis not done due to safety concerns  - s/p EBUS with FNA needle biopsy of multiple lymph nodes  - continue duonebs  - Discharged on short course of prednisone 40 mg for 5 days, and Augmentin to complete a 7-day course of treatment of total antibiotics  -Close outpatient follow-up  - Heme/onc consult for concern for malignancy                - agree with above plan                - outpatient PET/CT if the above tests are nondiagnostic  - awaiting cytology and path results     AECOPD  - pt has a smoking history, not currently tobacco user  - started nebs and steroids  - pulm on consult                - continue steroids at discharge     Hypercalcemia 2/2 vit D  toxicity - resolved  - IVF discontinued  - pth low, concern for malignancy induced hypercalcemia - will check ct a/p and tumor markers - heme/onc consulted  - endo consulted                - appreciate recs  - Ca WNL  - monitor labs     Acute kidney injury   - possibly prerenal?  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  Patient is being discharged on p.o. prednisone for 5 days along with Augmentin for 2 days to complete a 7-day course of total antibiotic treatment.  Repeat CMP was ordered to be done as outpatient for further monitoring and management of transaminitis.  Patient is to remain compliant with all discharge medications and instructions and to follow up as advised.   Patient encouraged to make healthy lifestyle and dietary changes.    Lace+ Score: 47  59-90 High Risk  29-58 Medium Risk  0-28   Low Risk       TCM Follow-Up Recommendation:  LACE 29-58: Moderate Risk of readmission after discharge from the hospital.      Procedures during hospitalization:   EBSU with biopsy    Incidental or significant findings and recommendations (brief descriptions):  None    Lab/Test results pending at Discharge:   Pathology    Consultants:  Consultants         Provider   Role Specialty     Jacinto Elena DO      Consulting Physician Hematology and Oncology     oRsy Morel MD      Consulting Physician ENDOCRINOLOGY     Altagracia Santos DO       Consulting Physician PULMONARY DISEASES            Discharge Medication List:     Discharge Medications        START taking these medications        Instructions Prescription details   amoxicillin clavulanate 875-125 MG Tabs  Commonly known as: Augmentin      Take 1 tablet (875 mg total) by mouth every 12 (twelve) hours for 2 days.   Stop taking on: July 14, 2025  Quantity: 4 tablet  Refills: 0     predniSONE 20 MG Tabs  Commonly known as: Deltasone  Start taking on: July 13, 2025      Take 2 tablets (40 mg total) by mouth daily with breakfast for 5 days.   Stop taking on: July 18, 2025  Quantity: 10 tablet  Refills: 0            CONTINUE taking these medications        Instructions Prescription details   albuterol 108 (90 Base) MCG/ACT Aers  Commonly known as: Ventolin HFA      Inhale 2 puffs into the lungs every 4 (four) hours as needed for Wheezing.   Quantity: 1 each  Refills: 0     amLODIPine 10 MG Tabs  Commonly known as: Norvasc      Take 1 tablet (10 mg total) by mouth daily.   Quantity: 90 tablet  Refills: 3     Banophen 25 MG Caps  Generic drug: diphenhydrAMINE      Take 1 capsule (25 mg total) by mouth every 6 (six) hours as needed for Itching or Allergies.   Refills: 0     EPINEPHrine 0.3 MG/0.3ML Soaj  Commonly known as: EpiPen 2-Yusuf      Inject IM in event of  allergic reaction   Quantity: 1 each  Refills: 0     levocetirizine 5 MG Tabs  Commonly known as: Xyzal      Take 1 tablet (5 mg total) by mouth in the morning and 1 tablet (5 mg total) before bedtime.   Quantity: 180 tablet  Refills: 1     rosuvastatin 10 MG Tabs  Commonly known as: Crestor      Take 1 tablet (10 mg total) by mouth nightly. FOR CHOLESTEROL.   Quantity: 90 tablet  Refills: 3            STOP taking these medications      methylPREDNISolone 4 MG Tbpk  Commonly known as: Medrol Dosepak        triamcinolone 0.5 % Crea  Commonly known as: Kenalog                  Where to Get Your Medications        These medications were sent to  [id number] - [place], [place] - [address], [phone], [phone] [address], [facility] [address]      Phone: [phone]   amoxicillin clavulanate 875-125 MG Tabs  predniSONE 20 MG Tabs         [place]    Follow-up appointment:   [facility]  [address]  [facility] [id number] [phone]  Schedule an appointment as soon as possible for a visit  Pneumonia follow up    Martin Adams MD  [address]  [address]  [facility] [id number] [phone]    Schedule an appointment as soon as possible for a visit in 1 week(s)      Nick Johnson DO  [address] [facility] [address]  [facility] [id number] [phone]    Follow up in 1 week(s)      Farhana Acevedo MD  [address] [place] [address]  [facility] [id number] [phone]    Follow up in 1 week(s)      Appointments for Next 30 Days 7/12/2025 - 8/11/2025      None            Vital signs:  Temp:  [97.6 °F (36.4 °C)-98.2 °F (36.8 °C)] 98.2 °F (36.8 °C)  Pulse:  [71-84] 71  Resp:  [16-22] 16  BP: (113-129)/(68-71) 129/68  SpO2:  [88 %-95 %] 92 %    Physical Exam:    Gen: NAD AO x3  Chest: good air entry CTABL  CVS: normal s1 and s2 RR  Abd: NABS soft NT ND  Neuro: CN 2-12 grossly intact  Ext: no edema in bilateral LE    -----------------------------------------------------------------------------------------------  PATIENT DISCHARGE INSTRUCTIONS: See electronic chart    Aster Issa MD  Hospitalist    Time spent:  > 30 minutes    The 21st Century Cures Act makes medical notes like these available to patients in the interest of transparency. Please be advised this is a medical document. Medical documents are intended to carry relevant information, facts as evident, and the clinical opinion of the practitioner. The medical note is intended as peer to peer communication and may appear blunt or direct. It is written in medical language and may contain abbreviations  or verbiage that are unfamiliar.

## 2025-07-14 ENCOUNTER — PATIENT OUTREACH (OUTPATIENT)
Dept: CASE MANAGEMENT | Age: 65
End: 2025-07-14

## 2025-07-14 ENCOUNTER — OFFICE VISIT (OUTPATIENT)
Dept: ENDOCRINOLOGY CLINIC | Facility: CLINIC | Age: 65
End: 2025-07-14

## 2025-07-14 ENCOUNTER — TELEPHONE (OUTPATIENT)
Dept: PULMONOLOGY | Facility: CLINIC | Age: 65
End: 2025-07-14

## 2025-07-14 ENCOUNTER — TELEPHONE (OUTPATIENT)
Dept: ENDOCRINOLOGY CLINIC | Facility: CLINIC | Age: 65
End: 2025-07-14

## 2025-07-14 ENCOUNTER — TELEPHONE (OUTPATIENT)
Age: 65
End: 2025-07-14

## 2025-07-14 ENCOUNTER — RESULTS FOLLOW-UP (OUTPATIENT)
Dept: ENDOCRINOLOGY CLINIC | Facility: CLINIC | Age: 65
End: 2025-07-14

## 2025-07-14 VITALS
HEIGHT: 65 IN | WEIGHT: 182 LBS | HEART RATE: 71 BPM | SYSTOLIC BLOOD PRESSURE: 114 MMHG | DIASTOLIC BLOOD PRESSURE: 70 MMHG | BODY MASS INDEX: 30.32 KG/M2

## 2025-07-14 DIAGNOSIS — R79.89 LOW SERUM CORTISOL LEVEL: ICD-10-CM

## 2025-07-14 DIAGNOSIS — R16.1 SPLENOMEGALY: Primary | ICD-10-CM

## 2025-07-14 DIAGNOSIS — R59.0 MEDIASTINAL LYMPHADENOPATHY: ICD-10-CM

## 2025-07-14 DIAGNOSIS — R06.00 DYSPNEA: ICD-10-CM

## 2025-07-14 DIAGNOSIS — R59.1 LYMPHADENOPATHY: ICD-10-CM

## 2025-07-14 DIAGNOSIS — R91.1 PULMONARY NODULE: ICD-10-CM

## 2025-07-14 DIAGNOSIS — R50.9 FEVER: ICD-10-CM

## 2025-07-14 DIAGNOSIS — E83.52 HYPERCALCEMIA DUE TO HIGH VITAMIN D LEVEL: Primary | ICD-10-CM

## 2025-07-14 DIAGNOSIS — E83.52 HYPERCALCEMIA: ICD-10-CM

## 2025-07-14 DIAGNOSIS — E67.3 HYPERCALCEMIA DUE TO HIGH VITAMIN D LEVEL: Primary | ICD-10-CM

## 2025-07-14 LAB
ASPERGILLUS AG BAL/SERUM: 0.03 INDEX
CD10 CELLS NFR SPEC: <1 %
CD10/CD19: <1 %
CD19 CELLS NFR SPEC: 14 %
CD19+/CD200+: 3 %
CD2 CELLS NFR SPEC: 87 %
CD20 CELLS NFR SPEC: 11 %
CD200 CELLS: 18 %
CD3 CELLS NFR SPEC: 85 %
CD3+/TCRGD+: 1 %
CD3+CD4+ CELLS NFR SPEC: 58 %
CD3+CD4+ CELLS/CD3+CD8+ CLL SPEC: 2.1
CD3+CD8+ CELLS NFR SPEC: 28 %
CD3-/CD56+: 1 %
CD34 CELLS NFR SPEC: <1 %
CD38 CELLS NFR SPEC: 12 %
CD38+/CD19+: 5 %
CD45 CELLS NFR SPEC: 100 %
CD5 CELLS NFR SPEC: 84 %
CD5/CD19 CELLS: <1 %
CD7 CELLS NFR SPEC: 83 %
CELL SURF KAPPA/LAMBDA RATIO: 1.8
CELL SURF LAMBDA LIGHT CHAIN: 4 %
CELL SURFACE KAPPA LIGHT CHAIN: 7 %
TCR G-D CELLS NFR SPEC: 1 %

## 2025-07-14 PROCEDURE — 3008F BODY MASS INDEX DOCD: CPT | Performed by: INTERNAL MEDICINE

## 2025-07-14 PROCEDURE — 99214 OFFICE O/P EST MOD 30 MIN: CPT | Performed by: INTERNAL MEDICINE

## 2025-07-14 PROCEDURE — 3078F DIAST BP <80 MM HG: CPT | Performed by: INTERNAL MEDICINE

## 2025-07-14 PROCEDURE — 3074F SYST BP LT 130 MM HG: CPT | Performed by: INTERNAL MEDICINE

## 2025-07-14 NOTE — PROGRESS NOTES
Transitional Care Management   Discharge Date: 25  Contact Date: 2025    Assessment:  (Insert appropriate Smartphrase below after completing flowsheet)  TCM Initial Assessment    General:  Assessment completed with: Patient  Patient Subjective: The patient states he is feeling much better.  Chief Complaint: Multifocal pneumonia  Acute respiratory failure with hypoxia (HCC)  CAROL (acute kidney injury)  Hypercalcemia  Mediastinal lymphadenopathy  Verify patient name and  with patient/ caregiver: Yes    Hospital Stay/Discharge:  Tell me what you understand of why you were in the hospital or emergency department: Shortness diagnosed with pneumonia  Prior to leaving the hospital were your Discharge Instructions reviewed with you?: Yes  Did you receive a copy of your written Discharge Instructions?: Yes  What questions do you have about your Discharge Instructions?: None  Do you feel better or worse since you left the hospital or emergency department?: Better    Follow - Up Appointment:  Do you have a follow-up appointment?: No  Are there any barriers to getting to your follow-up appointment?: No    Home Health/DME:  Prior to leaving the hospital was Home Health (HH) arranged for you?: No     Prior to leaving the hospital or emergency department was Durable Medical Equipment (DME), medical supplies, or infusions arranged for you?: No  Are DME/medical supply/infusions needs identified by staff during this assessment?: No     Medications/Diet:       Were you given a different diet per your Discharge Instructions?: No     Questions/Concerns:  Do you have any questions or concerns that have not been discussed?: No       Follow-up Appointments:  Your appointments       Date & Time Appointment Department (Center)    Jul 15, 2025 1:00 PM CDT Hospital Follow Up with Martin Adams MD [facility] ([facility])        2025 10:00 AM CDT Follow Up Visit with Alex  Nick Weber,  [facility], [facility] ([facility])              [facility], [facility]  [address]  [facility] [id number] [phone] [facility]  [address]  [facility] [id number] [phone]          Transitional [facility] Clinic  Was TCC Ordered: No  Was TCC Scheduled: No   - If yes: []  Advised patient to bring all medications and blood glucose meter/supplies if applicable.    Primary Care Provider (If no TCC appointment)  Does patient already have a PCP appointment scheduled? Yes  Care Manager Scheduled PCP office TCM appointment with patient     Specialist  Does the patient have any other follow-up appointment(s) that need to be scheduled? Yes   -If yes: Care Manager reviewed upcoming specialist appointments with patient: Yes   -Does the patient need assistance scheduling appointment(s): No      Book By Date: 7/26/25

## 2025-07-14 NOTE — TELEPHONE ENCOUNTER
Call attempted to patient, however it went straight to voicemail with no patient identifiers. Writer then called the next number listed which was for his niece Anneliese. Discussed scheduling of PET for 7/18 and follow up on 7/24.

## 2025-07-14 NOTE — PROGRESS NOTES
Reason for Visit:    concern he has adrenal insufficiency   Requesting Physician:   .Macarena Adams MD    CHIEF COMPLAINT:    Chief Complaint   Patient presents with    Consult     hypoparathyroid      Last completed office visit: Visit date not found   Next scheduled Follow up:   Future Appointments   Date Time Provider Department Center   7/15/2025  1:00 PM Martin Adams MD [facility]   7/16/2025  1:00 PM Shanna Flores APRN [facility] SPCIALTY [facility]   7/18/2025 10:00 AM Nick Johnson,  [facility]   7/18/2025 11:15 AM [facility] PET DOSE RM [facility] PET SCAN [facility]   7/18/2025 12:30 PM [facility] PET RM1 [facility] PET SCAN [facility]   7/24/2025  4:00 PM Jacinto Elena,  [facility] [place]        HISTORY OF PRESENT ILLNESS:   Karel Ford is a 64 year old male who presents with   concern he has adrenal insufficiency   He was seen with his wife     He was admitted for hypercalcemia d/to excess vitamin D consumption- accidentally took 50k units every day for 12 days   At that time his PThrP was checked this can be one of the causes of high clcium when high. Good news, this is not elevated  Also, we had checked his cortisol in the hospital, this was on the lower side of normal     He denied orthostatic changes   He denied adrenal insufficiency symptoms   He took prednisone short term only. He is on   He was started on prednisone ~2 mo ago. He is on  prednisone  40 mg/day for 5 days started 7/13/2025  He has outpatient Rx for medrol dosepak 6/9/2025, 6/25/2025,   Prednisone 12/20/2017 for 5 days. 20 mg every day for 4 days 3/31/2025, for 5 days 4/2/2025,      Inpt w/u showed    Cortisol 9.9    Calcium  13.5 , albumin  2.7   PTH low  25 and 1,25 Vitamin D 25  high          ASSESSMENT AND PLAN:  Karel Ford is a 64 year old male who presents with   concern he has adrenal insufficiency   Clinically no concerning symptoms for AI   Now on steroids so will wait till he is off steroids for few days then reassess   Plan    Labs at 8-9 AM fasting , water is ok, few days after stopping prednisone     Labs today   If labs are normal, follow up with PCP   If labs are abnormal, follow up with endocrine          PAST MEDICAL HISTORY:   Past Medical History:    Essential hypertension    Hyperlipidemia       PAST SURGICAL HISTORY:   History reviewed. No pertinent surgical history.    CURRENT MEDICATIONS:     predniSONE 20 MG Oral Tab Take 2 tablets (40 mg total) by mouth daily with breakfast for 5 days. 10 tablet 0    amLODIPine 10 MG Oral Tab Take 1 tablet (10 mg total) by mouth daily. 90 tablet 3    rosuvastatin 10 MG Oral Tab Take 1 tablet (10 mg total) by mouth nightly. FOR CHOLESTEROL. 90 tablet 3    levocetirizine 5 MG Oral Tab Take 1 tablet (5 mg total) by mouth in the morning and 1 tablet (5 mg total) before bedtime. 180 tablet 1       ALLERGIES:  Allergies   Allergen Reactions    Citrated Pterylmonoglutamic Acid [Glutamic Acid] UNKNOWN    [place] UNKNOWN    [place] UNKNOWN    Ragweed UNKNOWN    [place] UNKNOWN    Lisinopril RASH       SOCIAL HISTORY:    Social History     Socioeconomic History    Marital status:    Tobacco Use    Smoking status: Former     Current packs/day: 0.00     Types: Cigarettes     Quit date: 2008     Years since quittin.5     Passive exposure: Past    Smokeless tobacco: Former   Vaping Use    Vaping status: Never Used   Substance and Sexual Activity    Alcohol use: Yes     Alcohol/week: 3.0 standard drinks of alcohol     Types: 3 Cans of beer per week     Comment: maybe 3 beers a day    Drug use: Never     Smoking no  Marijuana no  Etoh yes     FAMILY HISTORY:   Family History   Problem Relation Age of Onset    Lung Disorder Mother     Heart Disease Father         REVIEW OF SYSTEMS:  All negative other than HPI    PHYSICAL EXAM:   Height: 5' 5\" (165.1 cm) (1550)  Weight: 182 lb (82.6 kg) (1550)  BSA (Calculated - sq m): 1.9 sq meters (1550)  Pulse: 71  (07/14 1550)  BP: 114/70 (07/14 1550)  Temp: --  Do Not Use - Resp Rate: --  SpO2: --    Body mass index is 30.29 kg/m².    CONSTITUTIONAL:  Awake and alert. Age appropriate, good hygiene not in acute distress. Well-nourished and well developed. no acute distress      DATA:     Pertinent data reviewed        07/08/25 06:51   Cortisol  9.9        07/06/25 13:58   PTH INTACT  <6.3 (L)   (L): Data is abnormally low   Latest Reference Range & Units 05/20/25 15:41   TSH 0.550 - 4.780 uIU/mL 2.119   ANTI-THYROGLOBULIN <60 U/mL <15   ANTI-THYROPEROXIDASE <60 U/mL 31      Latest Reference Range & Units 07/06/25 13:58   VITAMIN D, 25-OH, TOTAL 30.0 - 100.0 ng/mL 199.7 (H)       Latest Reference Range & Units 07/07/25 15:35   Vit D 1,25 di-OH 24.8 - 81.5 pg/mL 190.0 (H)       Latest Reference Range & Units 07/06/25 13:58   PTH-RELATED PEPTIDE pmol/L <2.0        No results for input(s): \"TSH\", \"T4F\", \"T3F\", \"THYP\" in the last 72 hours.  No results found.    Orders Placed This Encounter   Procedures    PTH, Intact    Comp Metabolic Panel (14)    Magnesium    Phosphorus    Vitamin D    Vitamin D, 1,25 Dihydroxy    CORTISOL - AM     Orders Placed This Encounter    PTH, Intact     Standing Status:   Future     Expected Date:   7/14/2025     Expiration Date:   7/14/2026    Comp Metabolic Panel (14)     Standing Status:   Future     Expected Date:   7/14/2025     Expiration Date:   7/14/2026     Release to patient:   Immediate    Magnesium     Standing Status:   Future     Expected Date:   7/14/2025     Expiration Date:   7/14/2026     Release to patient:   Immediate    Phosphorus     Standing Status:   Future     Expected Date:   7/14/2025     Expiration Date:   7/14/2026     Release to patient:   Immediate    Vitamin D     Standing Status:   Future     Expected Date:   7/14/2025     Expiration Date:   7/14/2026     Please pick the scenario that best fits the purpose for ordering this test:   General Screening/Vit D deficiency  (25-Hydroxy)     Release to patient:   Immediate    Vitamin D, 1,25 Dihydroxy     Standing Status:   Future     Expected Date:   7/14/2025     Expiration Date:   7/14/2026     Release to patient:   Immediate    CORTISOL - AM     Standing Status:   Future     Expected Date:   7/14/2025     Expiration Date:   7/14/2026     Release to patient:   Immediate          This is a specialized patient consultation in endocrinology and required comprehensive review of prior records, as well as current evaluation, with time required for consideration of complex endocrine issues and consultation. For this visit, I personally interviewed the patient, and family member if accompanied, performed the pertinent parts of the history and physical examination. ROS included screening for appropriate endocrine conditions.   Today's diagnosis and plan were reviewed in detail with the patient who states understanding and agrees with plan. I discussed with the patient possible diagnosis, differential diagnosis, need for work up, treatment options, alternatives and side effects.     Please see note for details about time spent which includes:   · pre-visit preparation  · reviewing records  · face to face time with the patient   · timely documentation of the encounter  · ordering medications/tests  · communication with care team  · care coordination    I appreciate the opportunity to be part of your patient's medical care and will keep you, as the referring and primary physicians, informed about the care of your patient. Please feel free to contact me should you have any questions.    The 21st Century Cures Act makes medical notes like these available to patients in the interest of transparency. Please be advised this is a medical document. Medical documents are intended to carry relevant information, facts as evident, and the clinical opinion of the practitioner. The medical note is intended as peer to peer communication and may appear blunt  or direct. It is written in medical language and may contain abbreviations or verbiage that are unfamiliar.   Deja Willis MD

## 2025-07-14 NOTE — TELEPHONE ENCOUNTER
Spoke with Elisabeth, we had an opening with Dr. Willis today at [address] in [place], they accepted, were given directions to location. No additional questions or concerns at this time.

## 2025-07-14 NOTE — PATIENT INSTRUCTIONS
Labs at 8-9 AM fasting , water is ok, few days after stopping prednisone     Labs today   If labs are normal, follow up with PCP   If labs are abnormal, follow up with endocrine        Latest Reference Range & Units 07/07/25 15:35   Vit D 1,25 di-OH 24.8 - 81.5 pg/mL 190.0 (H)   (H): Data is abnormally high       Latest Reference Range & Units 07/06/25 13:58   VITAMIN D, 25-OH, TOTAL 30.0 - 100.0 ng/mL 199.7 (H)   (H): Data is abnormally high

## 2025-07-14 NOTE — TELEPHONE ENCOUNTER
Spoke with patient. Appointment scheduled for 7/18/25 at 10AM. Confirmed date, time, place, and parking. Patient verbalized understanding.

## 2025-07-14 NOTE — TELEPHONE ENCOUNTER
Elisabeth requesting patient to be seen sooner than next available for hospital follow up.  Please call.  Thank you.

## 2025-07-14 NOTE — PROGRESS NOTES
Hospital Follow up for SCC (Discharge 7/12 elm)     [facility] [address]  [phone]  Pneumonia   Appt made for 7/16@1pm   PCP   Martin Adams   [address] [place] [address]   [phone]   Existing appt for 7/15@1pm     Confirmed with pt   Closing encounter

## 2025-07-14 NOTE — TELEPHONE ENCOUNTER
Nick Johnson DO [facility] Clinical Staff  Needs f/u with us within 1 week    Thanks    Tia Johnson

## 2025-07-14 NOTE — TELEPHONE ENCOUNTER
Patient was seen in the hospital for high calcium related to excess vitamin D consumption  At that time his PThrP was checked this can be one of the causes of high clcium when high. Good news, this is not elevated  Also, we had checked his cortisol in the hospital, this was on the lower side of normal   Hence , I recommend repeating this  Cortisol serum  Diagnosis low serum cortisol  Between 7-8 am   After fasting for 8-10 hours  SYmptoms of low cortisol can include:  low BP, lightheadeness, fatigue, nausea, vomiting, abd pain

## 2025-07-15 ENCOUNTER — OFFICE VISIT (OUTPATIENT)
Dept: INTERNAL MEDICINE CLINIC | Facility: CLINIC | Age: 65
End: 2025-07-15
Payer: COMMERCIAL

## 2025-07-15 VITALS
SYSTOLIC BLOOD PRESSURE: 116 MMHG | BODY MASS INDEX: 30.16 KG/M2 | HEIGHT: 65 IN | DIASTOLIC BLOOD PRESSURE: 68 MMHG | WEIGHT: 181 LBS | HEART RATE: 71 BPM

## 2025-07-15 DIAGNOSIS — R59.0 MEDIASTINAL LYMPHADENOPATHY: ICD-10-CM

## 2025-07-15 DIAGNOSIS — R74.01 TRANSAMINITIS: Primary | ICD-10-CM

## 2025-07-15 DIAGNOSIS — J18.9 MULTIFOCAL PNEUMONIA: ICD-10-CM

## 2025-07-15 DIAGNOSIS — Z09 HOSPITAL DISCHARGE FOLLOW-UP: ICD-10-CM

## 2025-07-15 DIAGNOSIS — E83.52 HYPERCALCEMIA: ICD-10-CM

## 2025-07-15 LAB
ALBUMIN SERPL ELPH-MCNC: 2.42 G/DL (ref 3.75–5.21)
ALBUMIN/GLOB SERPL: 0.4 {RATIO} (ref 1–2)
ALPHA1 GLOB SERPL ELPH-MCNC: 0.47 G/DL (ref 0.19–0.46)
ALPHA2 GLOB SERPL ELPH-MCNC: 0.71 G/DL (ref 0.48–1.05)
B-GLOBULIN SERPL ELPH-MCNC: 1.36 G/DL (ref 0.68–1.23)
GAMMA GLOB SERPL ELPH-MCNC: 3.54 G/DL (ref 0.62–1.7)
KAPPA LC FREE SER-MCNC: 34.75 MG/DL (ref 0.33–1.94)
KAPPA LC FREE/LAMBDA FREE SER NEPH: 1.12 {RATIO} (ref 0.26–1.65)
LAMBDA LC FREE SERPL-MCNC: 31.12 MG/DL (ref 0.57–2.63)
PROT SERPL-MCNC: 8.5 G/DL (ref 5.7–8.2)

## 2025-07-15 PROCEDURE — 99496 TRANSJ CARE MGMT HIGH F2F 7D: CPT | Performed by: STUDENT IN AN ORGANIZED HEALTH CARE EDUCATION/TRAINING PROGRAM

## 2025-07-15 PROCEDURE — 3074F SYST BP LT 130 MM HG: CPT | Performed by: STUDENT IN AN ORGANIZED HEALTH CARE EDUCATION/TRAINING PROGRAM

## 2025-07-15 PROCEDURE — 3078F DIAST BP <80 MM HG: CPT | Performed by: STUDENT IN AN ORGANIZED HEALTH CARE EDUCATION/TRAINING PROGRAM

## 2025-07-15 PROCEDURE — 3008F BODY MASS INDEX DOCD: CPT | Performed by: STUDENT IN AN ORGANIZED HEALTH CARE EDUCATION/TRAINING PROGRAM

## 2025-07-15 NOTE — PROGRESS NOTES
Subjective:   Karel Ford is a 64 year old male who presents for hospital follow up.   He was discharged from [facility] to Home or Self Care  Admission Date: 7/6/25   Discharge Date: 7/12/25  Hospital Discharge Diagnosis: multifocal pneumonia, CAROL , hypercalcinemia     Interactive contact within 2 business days post discharge first initiated on Date: 7/14/2025    During the visit, the following was completed:  Obtained and reviewed discharge summary, continuity of care documents, and Hospitalist notes  Reviewed Labs (CBC, CMP), Labs (Cardiac markers), Labs (Microbiology), and CT radiology results    History/Other:   Current Medications:  Medication Reconciliation:  I am aware of an inpatient discharge within the last 30 days.  The discharge medication list has been reconciled with the patient's current medication list and reviewed by me. See medication list for additions of new medication, and changes to current doses of medications and discontinued medications.  Outpatient Medications Marked as Taking for the 7/15/25 encounter (Office Visit) with Martin Adams MD   Medication Sig    predniSONE 20 MG Oral Tab Take 2 tablets (40 mg total) by mouth daily with breakfast for 5 days.    diphenhydrAMINE (BANOPHEN) 25 MG Oral Cap Take 1 capsule (25 mg total) by mouth every 6 (six) hours as needed for Itching or Allergies.    amLODIPine 10 MG Oral Tab Take 1 tablet (10 mg total) by mouth daily.    rosuvastatin 10 MG Oral Tab Take 1 tablet (10 mg total) by mouth nightly. FOR CHOLESTEROL.    levocetirizine 5 MG Oral Tab Take 1 tablet (5 mg total) by mouth in the morning and 1 tablet (5 mg total) before bedtime. (Patient taking differently: Take 1 tablet (5 mg total) by mouth as needed.)    albuterol (VENTOLIN HFA) 108 (90 Base) MCG/ACT Inhalation Aero Soln Inhale 2 puffs into the lungs every 4 (four) hours as needed for Wheezing.       OFFICE NOTE       The following individual(s) verbally consented to be  recorded using ambient AI listening technology and understand that they can each withdraw their consent to this listening technology at any point by asking the clinician to turn off or pause the recording:    Patient name: Karel Ford  Additional names:            Patient ID: Karel Ford is a 64 year old male.  Today's Date: 07/15/25  Chief Complaint: TCM (Transition Of Care Management)      History of Present Illness  Karel Ford is a 64 year old male who presents for hospital discharge follow-up after an admission for acute kidney injury and acute respiratory failure due to multifocal pneumonia.    He was hospitalized from July 6th to July 12th for acute kidney injury and acute respiratory failure secondary to multifocal pneumonia. During his hospital stay, he underwent a bronchoscopy and experienced hypercalcemia and central lymphadenopathy. He also had an allergic reaction, cough, and fever. He currently feels 'good', estimating his recovery at 85-90%.    He is taking prednisone and antibiotics, prescribed for five days starting on Sunday. He is scheduled to see a pulmonologist and have a PET scan on July 18th. Biopsy results from the bronchoscopy are pending, but initial cytology results from July 11th showed no malignancy in the lung lavage or lymph nodes.    He experienced acute transaminitis during his hospital stay, leading to the temporary discontinuation of his statin medication to allow liver recovery. Liver enzymes remain elevated.    There was an incident of vitamin D intoxication due to a dosing error, where he was mistakenly given 50,000 units daily instead of weekly, leading to elevated vitamin D levels. This was identified as an incidental finding and not the cause of his hospitalization.    He has a history of heavy smoking, with a 30-40 pack-year history, and his oxygen saturation was critically low at 80% upon arrival at the ER. His wife reports he was confused and unresponsive prior to  hospitalization, with symptoms persisting from Thursday night until Sunday morning when he was admitted to the hospital.    He is currently on FMLA leave and is considering returning to work on July 28th, pending his recovery and completion of medical evaluations.       Vitals:    07/15/25 1302   BP: 116/68   Pulse: 71   Weight: 181 lb (82.1 kg)   Height: 5' 5\" (1.651 m)     body mass index is 30.12 kg/m².  BP Readings from Last 3 Encounters:   07/15/25 116/68   07/14/25 114/70   07/12/25 129/68     The 10-year ASCVD risk score (Lauren NASSAR, et al., 2019) is: 11.3%    Values used to calculate the score:      Age: 64 years      Sex: Male      Is Non- : No      Diabetic: No      Tobacco smoker: No      Systolic Blood Pressure: 116 mmHg      Is BP treated: Yes      HDL Cholesterol: 37 mg/dL      Total Cholesterol: 166 mg/dL  Results  PATHOLOGY  Lung Cytology: Benign inflammatory reactive changes, no malignant cells (07/11/2025)  Bronchial Ultrasound Guided Fine Needle Aspiration: Benign, no malignant cells identified (07/11/2025)  Leukemia Lymphoma Flow Cytometry: No malignancy detected (07/11/2025)       Medications reviewed:  Current Medications[1]      Assessment & Plan    1. Transaminitis (Primary)  2. Hospital discharge follow-up  3. Mediastinal lymphadenopathy  4. Hypercalcemia  5. Multifocal pneumonia    Assessment & Plan  Multifocal Pneumonia  Hospitalized for acute respiratory failure due to multifocal pneumonia. Bronchoscopy showed benign inflammatory changes. On prednisone and antibiotics, feeling 85-90% recovered. PET scan scheduled to rule out other issues.  - Continue prednisone and antibiotics as prescribed.  - Follow up with pulmonologist on July 18.  - Perform PET scan on July 18.    Hypercalcemia  Hypercalcemia likely due to excessive vitamin D intake. Vitamin D level was 190 due to dosing error. Endocrinologist advised to wait until after prednisone course to repeat labs.  -  Discontinue excessive vitamin D intake.  - Repeat parathyroid hormone, CMP, magnesium, phosphate, vitamin D, and cortisol AM levels after stopping prednisone.  - Follow up with endocrinologist if labs are abnormal.    Transaminitis  Elevated liver enzymes possibly related to hypercalcemia and vitamin D intoxication. Statins stopped for liver recovery.  - Hold cholesterol medication until liver enzymes normalize.  - Repeat CMP and complete blood count after seeing pulmonologist.    Goals of Care  On FMLA due to hospitalization and recovery. Plans to return to work on July 28, 2025, pending improvement.  - Provide letter for FMLA extension until July 28, 2025.  - Advise return to work on July 28, 2025, if improved, or contact office for extension if needed.       Follow Up: As needed/if symptoms worsen or No follow-ups on file..     Objective/ Results:   Physical Exam  Constitutional:       Appearance: He is well-developed.   Cardiovascular:      Rate and Rhythm: Normal rate and regular rhythm.      Heart sounds: Normal heart sounds.   Pulmonary:      Effort: Pulmonary effort is normal.      Breath sounds: Normal breath sounds.   Abdominal:      General: Bowel sounds are normal.      Palpations: Abdomen is soft.   Skin:     General: Skin is warm and dry.   Neurological:      Mental Status: He is alert and oriented to person, place, and time.      Deep Tendon Reflexes: Reflexes are normal and symmetric.        Physical Exam  CHEST: Lungs clear to auscultation bilaterally.  CARDIOVASCULAR: Heart sounds normal, regular rate and rhythm.     Reviewed:    Patient Active Problem List    Diagnosis    Mediastinal lymphadenopathy    Multifocal pneumonia    Acute respiratory failure with hypoxia (HCC)    CAROL (acute kidney injury)    Hypercalcemia    Essential hypertension    Hyperlipidemia      Allergies[2]   Short Social Hx on File[3]   Review of Systems   Constitutional: Negative.    Respiratory: Negative.     Cardiovascular:  Negative.    Gastrointestinal: Negative.    Skin: Negative.    Neurological: Negative.        All other systems negative unless otherwise stated in ROS or HPI above.       Martin Adams MD  Internal Medicine       Call office with any questions or seek emergency care if necessary.   Patient understands and agrees to follow directions and advice.      ----------------------------------------- PATIENT INSTRUCTIONS-----------------------------------------     There are no Patient Instructions on file for this visit.        The 21st Century Cures Act makes medical notes available to patients in the interest of transparency.  However, please be advised that this is a medical document.  It is intended as a peer to peer communication.  It is written in medical language and may contain abbreviations or verbiage that are technical and unfamiliar.  It may appear blunt or direct.  Medical documents are intended to carry relevant information, facts as evident, and the clinical opinion of the practitioner.         Subjective:   Karel Ford is a 64 year old male who presents for hospital follow up.   He was [facility] from [facility] to Home or Self Care  Admission Date: 7/6/25   Discharge Date: 7/12/25  Hospital Discharge Diagnosis: multifocal pneumonia, CAROL , hypercalcinemia     Interactive contact within 2 business days post discharge first initiated on Date: 7/14/2025    During the visit, the following was completed:  Obtained and reviewed discharge summary, continuity of care documents, and Hospitalist notes  Reviewed Labs (CBC, CMP), Labs (Cardiac markers), Labs (Microbiology), and CT radiology results    History/Other:   Current Medications:  Medication Reconciliation:  I am aware of an inpatient discharge within the last 30 days.  The discharge medication list has been reconciled with the patient's current medication list and reviewed by me. See medication list for additions of new medication, and changes to current doses  of medications and discontinued medications.  Outpatient Medications Marked as Taking for the 7/15/25 encounter (Office Visit) with Martin Adams MD   Medication Sig    predniSONE 20 MG Oral Tab Take 2 tablets (40 mg total) by mouth daily with breakfast for 5 days.    diphenhydrAMINE (BANOPHEN) 25 MG Oral Cap Take 1 capsule (25 mg total) by mouth every 6 (six) hours as needed for Itching or Allergies.    amLODIPine 10 MG Oral Tab Take 1 tablet (10 mg total) by mouth daily.    rosuvastatin 10 MG Oral Tab Take 1 tablet (10 mg total) by mouth nightly. FOR CHOLESTEROL.    levocetirizine 5 MG Oral Tab Take 1 tablet (5 mg total) by mouth in the morning and 1 tablet (5 mg total) before bedtime. (Patient taking differently: Take 1 tablet (5 mg total) by mouth as needed.)    albuterol (VENTOLIN HFA) 108 (90 Base) MCG/ACT Inhalation Aero Soln Inhale 2 puffs into the lungs every 4 (four) hours as needed for Wheezing.          [1]   Current Outpatient Medications   Medication Sig Dispense Refill    predniSONE 20 MG Oral Tab Take 2 tablets (40 mg total) by mouth daily with breakfast for 5 days. 10 tablet 0    diphenhydrAMINE (BANOPHEN) 25 MG Oral Cap Take 1 capsule (25 mg total) by mouth every 6 (six) hours as needed for Itching or Allergies.      amLODIPine 10 MG Oral Tab Take 1 tablet (10 mg total) by mouth daily. 90 tablet 3    rosuvastatin 10 MG Oral Tab Take 1 tablet (10 mg total) by mouth nightly. FOR CHOLESTEROL. 90 tablet 3    levocetirizine 5 MG Oral Tab Take 1 tablet (5 mg total) by mouth in the morning and 1 tablet (5 mg total) before bedtime. (Patient taking differently: Take 1 tablet (5 mg total) by mouth as needed.) 180 tablet 1    albuterol (VENTOLIN HFA) 108 (90 Base) MCG/ACT Inhalation Aero Soln Inhale 2 puffs into the lungs every 4 (four) hours as needed for Wheezing. 1 each 0    EPINEPHrine (EPIPEN 2-ROSENDA) 0.3 MG/0.3ML Injection Solution Auto-injector Inject IM in event of  allergic reaction 1 each 0   [2]    Allergies  Allergen Reactions    Citrated Pterylmonoglutamic Acid [Glutamic Acid] UNKNOWN    Dander UNKNOWN    Eastern [place] UNKNOWN    Ragweed UNKNOWN    Rough [place] UNKNOWN    Lisinopril RASH   [3]   Social History  Socioeconomic History    Marital status:    Tobacco Use    Smoking status: Former     Current packs/day: 0.00     Types: Cigarettes     Quit date: 2008     Years since quittin.5     Passive exposure: Past    Smokeless tobacco: Former   Vaping Use    Vaping status: Never Used   Substance and Sexual Activity    Alcohol use: Not Currently     Alcohol/week: 3.0 standard drinks of alcohol     Types: 3 Cans of beer per week     Comment: quit     Drug use: Never     Social Drivers of Health     Food Insecurity: No Food Insecurity (2025)    NCSS - Food Insecurity     Worried About Running Out of Food in the Last Year: No     Ran Out of Food in the Last Year: No   Transportation Needs: No Transportation Needs (2025)    NCSS - Transportation     Lack of Transportation: No   Housing Stability: Not At Risk (2025)    NCSS - Housing/Utilities     Has Housing: Yes     Worried About Losing Housing: No     Unable to Get Utilities: No      small lesion left ear intact benign site

## 2025-07-18 ENCOUNTER — HOSPITAL ENCOUNTER (OUTPATIENT)
Dept: NUCLEAR MEDICINE | Facility: HOSPITAL | Age: 65
Discharge: HOME | End: 2025-07-18
Attending: STUDENT IN AN ORGANIZED HEALTH CARE EDUCATION/TRAINING PROGRAM
Payer: COMMERCIAL

## 2025-07-18 ENCOUNTER — TELEPHONE (OUTPATIENT)
Dept: PULMONOLOGY | Facility: CLINIC | Age: 65
End: 2025-07-18

## 2025-07-18 ENCOUNTER — OFFICE VISIT (OUTPATIENT)
Dept: PULMONOLOGY | Facility: CLINIC | Age: 65
End: 2025-07-18
Payer: COMMERCIAL

## 2025-07-18 VITALS
RESPIRATION RATE: 12 BRPM | WEIGHT: 182 LBS | DIASTOLIC BLOOD PRESSURE: 64 MMHG | BODY MASS INDEX: 30.32 KG/M2 | HEART RATE: 62 BPM | SYSTOLIC BLOOD PRESSURE: 109 MMHG | HEIGHT: 65 IN | OXYGEN SATURATION: 99 %

## 2025-07-18 DIAGNOSIS — R06.00 DYSPNEA: ICD-10-CM

## 2025-07-18 DIAGNOSIS — R16.1 SPLENOMEGALY: ICD-10-CM

## 2025-07-18 DIAGNOSIS — R91.1 PULMONARY NODULE: ICD-10-CM

## 2025-07-18 DIAGNOSIS — R59.0 MEDIASTINAL LYMPHADENOPATHY: ICD-10-CM

## 2025-07-18 DIAGNOSIS — R59.0 MEDIASTINAL LYMPHADENOPATHY: Primary | ICD-10-CM

## 2025-07-18 DIAGNOSIS — R50.9 FEVER: ICD-10-CM

## 2025-07-18 DIAGNOSIS — R59.1 LYMPHADENOPATHY: ICD-10-CM

## 2025-07-18 DIAGNOSIS — R06.83 SNORING: ICD-10-CM

## 2025-07-18 LAB — GLUCOSE BLDC GLUCOMTR-MCNC: 83 MG/DL (ref 70–99)

## 2025-07-18 PROCEDURE — 3074F SYST BP LT 130 MM HG: CPT | Performed by: INTERNAL MEDICINE

## 2025-07-18 PROCEDURE — 3078F DIAST BP <80 MM HG: CPT | Performed by: INTERNAL MEDICINE

## 2025-07-18 PROCEDURE — 82962 GLUCOSE BLOOD TEST: CPT

## 2025-07-18 PROCEDURE — 99214 OFFICE O/P EST MOD 30 MIN: CPT | Performed by: INTERNAL MEDICINE

## 2025-07-18 PROCEDURE — 3008F BODY MASS INDEX DOCD: CPT | Performed by: INTERNAL MEDICINE

## 2025-07-18 PROCEDURE — 78815 PET IMAGE W/CT SKULL-THIGH: CPT | Performed by: STUDENT IN AN ORGANIZED HEALTH CARE EDUCATION/TRAINING PROGRAM

## 2025-07-18 NOTE — PROGRESS NOTES
Outpatient Pulmonary Clinic Follow Up           Reason for encounter: hospital f/u       SUBJECTIVE:  Pt seen for f/u on 7/`8/25  Seen in the hospital for PNA  Chest CT with mediastinal lymphadenopathy s/p bronchoscopy with EBUS and BAL on 7/11/25.  Overall feels better. Coughs every once in a while.   Not on supp 02.   No fevers, chills, sweats.   Scheduled for PET scan later today.  Plans to see Dr. Elena later this month.   Family also concerned about ENRIQUETA  + snoring, wakes up a lot, wakes up tired, naps during the daytime  ESS 15      From previous clinic visit/recent hospitalization encounter 7/2025:  \"HPI: 65 yo male with hx of recent allergic reaction requiring steroids, HTN now admitted with coughing, shortness of breath, and fevers x 3 days.   No sick contacts.   Denies previous hx of pulmonary problems. No hx of asthma, COPD, recurrent PNAs  CXR concerning for PNA  Started on abx-unasyn/azithro along with nebs and solumedrol  Pt is afebrile on 4 LNC supp 02  ASSESSMENT/PLAN:  Multifocal PNA  -CXR with b/l infiltrates  -started on empiric unasyn/azithro. recommend switch to course of augmentin BID to complete 7 days total of abx  -urine leg neg   -s/p BAL-NGTD  -cont supp 02-currently weaned to RA  -nebs  -can continue short course steroids. Solumedrol switched to prednisone 60-can change to 40 mg x 5 days from pulm standpt  -chest CT concerning for pulmonary infiltrates, lung nodule, and bulky mediastinal lymphadenopathy. There is a small right pleural effusion  -attempted bedside US guided thoracentesis but pleural effusion is small with lung in the middle of the way obstructing ability to safely perform thoracentesis  -s/p bronchoscopy with EBUS and BAL on 7/11/25  -f/u on path and micro results from bronch  Elevated LFTS  -abd US showed steatosis and hepatomegaly; also shows GB adenomyomatosis, small gallstones  Recent allergic reaction  -s/p tx with steroids last month  -previously saw allergy and  ENT  Proph  -DVT: hep subcutaneous  Dispo  -full code  -should f/u in pulm clinic in 1 week. Pt given our clinic info on card  -ok discharge from pulmonary standpt\".       REVIEW OF SYSTEMS:  Positives and negatives as mentioned in the HPI above. Remainder of 12 pt review of systems is otherwise negative.       PAST MEDICAL HISTORY:  Past Medical History[1]       PAST SURGICAL HISTORY:  Past Surgical History[2]       PAST FAMILY HISTORY:  Family History[3]       PAST SOCIAL HISTORY:  Short Social Hx on File[4]       ALLERGIES:  Allergies[5]       MEDS:  Medications Ordered Prior to Encounter[6]       PHYSICAL EXAM:  /64   Pulse 62   Resp 12   Ht 5' 5\" (1.651 m)   Wt 182 lb (82.6 kg)   SpO2 99%   BMI 30.29 kg/m²   GEN: alert, oriented, pleasant, no apparent distress  HEENT: atraumatic normocephalic  MOUTH: mucous membranes are moist. No OP exudates.  NECK: no JVD. Trachea midline. No obvious thyromegaly  LUNG: clear b/l no wheezing, crackles. Chest symmetric with respiratory motion  HEART: regular rate and rhythm, no obvious murmers or gallops noted  ABD: soft non tender. + bowel sounds. No organomegaly noted  EXT: no clubbing, cyanosis, or edema noted. Pulses intact grossly  NEURO: no gross deficits  SKIN: warm, dry. No obvious lesions noted  LYMPH: no obvious LAD       IMAGES:  US chest 7/9/25  Small volume pleural effusion. Thoracentesis was deferred.      Chest CT scan 7/7/25  CONCLUSION  1.  Marked mediastinal lymphadenopathy. Coexistent splenomegaly and gastrohepatic ligament lymphadenopathy. Differential:lymphoma, metastases, and less likely sarcoid or benign etiology.   2.  A 16 mm ill-defined anterior right upper lobe pulmonary nodule-differential includes primary malignancy, metastatic disease, and less likely sarcoid or infection.   3.  Right greater than left basilar pleural effusion with mild pulmonary edema.   4.  Multifocal peripheral consolidations with nodularity differential includes  atypical infectious process, and malignancy.      CXR 7/6/25  CONCLUSION: Multifocal bilateral patchy opacities may represent pneumonia or edema.      LABS:  Lab Results   Component Value Date    WBC 10.3 07/12/2025    RBC 3.53 (L) 07/12/2025    HGB 10.3 (L) 07/12/2025    HCT 33.9 (L) 07/12/2025    MCV 96.0 07/12/2025    MCH 29.2 07/12/2025    MCHC 30.4 (L) 07/12/2025    MPV 7.2 (L) 04/17/2017     Recent Labs   Lab 07/12/25  0748   GLU 96   BUN 12   CREATSERUM 0.67*   EGFRCR 105   CA 9.3   ALB 3.0*   *   K 3.6      CO2 25.0   ALKPHO 282*   AST 46*   *   BILT 0.4   TP 7.8          PFT/SPIROMETRY RESULTS:  None        ASSESSMENT/PLAN:  Recent admission for multifocal PNA and mediastinal lymphadenopathy  -Treated with course of abx and steroids   -S/p bronchoscopy with EBUS  -Initial path c/w granulomas. Micro NGTD  -Plans to undergo PET scan today and see Dr. Elena next week from Hem/Onc  -Repeat a chest CT scan in 3 months.   -Possible PFTs in the future as well    Snoring, hypersomnia concerning for ENRIQUETA  -ESS 15  -Schedule a sleep study.  Contact the number on the After Visit Summary (AVS):  -Strongly advise absolutely NO driving when sleepy  -Recommend good sleep hygiene as discussed   -Avoid alcohol and caffeine before going to bed    RTC in 3 months    Thank you for the opportunity to care for Karel Ford.  I spent a total of 31 minutes in direct contact with the patient and reviewing pertinent outside records on the day of the encounter.       LIBRADO Johnson DO, MPH  Pulmonary and Critical Care Medicine  [facility]          [1]   Past Medical History:   Essential hypertension    Hyperlipidemia   [2] History reviewed. No pertinent surgical history.  [3]   Family History  Problem Relation Age of Onset    Lung Disorder Mother     Heart Disease Father    [4]   Social History  Socioeconomic History    Marital status:    Tobacco Use    Smoking  status: Former     Current packs/day: 0.00     Types: Cigarettes     Quit date: 2008     Years since quittin.5     Passive exposure: Past    Smokeless tobacco: Former   Vaping Use    Vaping status: Never Used   Substance and Sexual Activity    Alcohol use: Not Currently     Alcohol/week: 3.0 standard drinks of alcohol     Types: 3 Cans of beer per week     Comment: quit     Drug use: Never     Social Drivers of Health     Food Insecurity: No Food Insecurity (2025)    NCSS - Food Insecurity     Worried About Running Out of Food in the Last Year: No     Ran Out of Food in the Last Year: No   Transportation Needs: No Transportation Needs (2025)    NCSS - Transportation     Lack of Transportation: No   Housing Stability: Not At Risk (2025)    NCSS - Housing/Utilities     Has Housing: Yes     Worried About Losing Housing: No     Unable to Get Utilities: No   [5]   Allergies  Allergen Reactions    Citrated Pterylmonoglutamic Acid [Glutamic Acid] UNKNOWN    Dander UNKNOWN    Eastern [place] UNKNOWN    Ragweed UNKNOWN    Rough Kay [place] UNKNOWN    Lisinopril RASH   [6]   Current Outpatient Medications on File Prior to Visit   Medication Sig Dispense Refill    predniSONE 20 MG Oral Tab Take 2 tablets (40 mg total) by mouth daily with breakfast for 5 days. (Patient not taking: Reported on 2025) 10 tablet 0    diphenhydrAMINE (BANOPHEN) 25 MG Oral Cap Take 1 capsule (25 mg total) by mouth every 6 (six) hours as needed for Itching or Allergies.      amLODIPine 10 MG Oral Tab Take 1 tablet (10 mg total) by mouth daily. 90 tablet 3    rosuvastatin 10 MG Oral Tab Take 1 tablet (10 mg total) by mouth nightly. FOR CHOLESTEROL. 90 tablet 3    EPINEPHrine (EPIPEN 2-ROSENDA) 0.3 MG/0.3ML Injection Solution Auto-injector Inject IM in event of  allergic reaction 1 each 0    levocetirizine 5 MG Oral Tab Take 1 tablet (5 mg total) by mouth in the morning and 1 tablet (5 mg total) before bedtime. (Patient  taking differently: Take 1 tablet (5 mg total) by mouth as needed.) 180 tablet 1    albuterol (VENTOLIN HFA) 108 (90 Base) MCG/ACT Inhalation Aero Soln Inhale 2 puffs into the lungs every 4 (four) hours as needed for Wheezing. (Patient not taking: Reported on 7/18/2025) 1 each 0     No current facility-administered medications on file prior to visit.

## 2025-07-18 NOTE — PATIENT INSTRUCTIONS
Schedule a repeat Chest CT scan in 3 months.    Schedule a sleep study.  Contact the number on the After Visit Summary (AVS):  -Strongly advise absolutely NO driving when sleepy  -Recommend good sleep hygiene as discussed   -Avoid alcohol and caffeine before going to bed    Use the Albuterol if needed.     Come back in 3 months.

## 2025-07-23 ENCOUNTER — TELEPHONE (OUTPATIENT)
Dept: PULMONOLOGY | Facility: CLINIC | Age: 65
End: 2025-07-23

## 2025-07-24 ENCOUNTER — LAB ENCOUNTER (OUTPATIENT)
Dept: LAB | Facility: HOSPITAL | Age: 65
End: 2025-07-24
Attending: STUDENT IN AN ORGANIZED HEALTH CARE EDUCATION/TRAINING PROGRAM
Payer: COMMERCIAL

## 2025-07-24 ENCOUNTER — OFFICE VISIT (OUTPATIENT)
Facility: LOCATION | Age: 65
End: 2025-07-24
Attending: STUDENT IN AN ORGANIZED HEALTH CARE EDUCATION/TRAINING PROGRAM
Payer: COMMERCIAL

## 2025-07-24 VITALS
DIASTOLIC BLOOD PRESSURE: 75 MMHG | TEMPERATURE: 98 F | HEART RATE: 102 BPM | BODY MASS INDEX: 28.77 KG/M2 | WEIGHT: 179 LBS | OXYGEN SATURATION: 98 % | RESPIRATION RATE: 16 BRPM | SYSTOLIC BLOOD PRESSURE: 122 MMHG | HEIGHT: 66 IN

## 2025-07-24 DIAGNOSIS — J18.9 MULTIFOCAL PNEUMONIA: ICD-10-CM

## 2025-07-24 DIAGNOSIS — J18.9 MULTIFOCAL PNEUMONIA: Primary | ICD-10-CM

## 2025-07-24 DIAGNOSIS — R59.0 MEDIASTINAL LYMPHADENOPATHY: ICD-10-CM

## 2025-07-24 LAB
BASOPHILS # BLD AUTO: 0.04 X10(3) UL (ref 0–0.2)
BASOPHILS NFR BLD AUTO: 0.7 %
DEPRECATED HBV CORE AB SER IA-ACNC: 1125 NG/ML (ref 50–336)
DEPRECATED RDW RBC AUTO: 51.5 FL (ref 35.1–46.3)
EOSINOPHIL # BLD AUTO: 0.23 X10(3) UL (ref 0–0.7)
EOSINOPHIL NFR BLD AUTO: 4.1 %
ERYTHROCYTE [DISTWIDTH] IN BLOOD BY AUTOMATED COUNT: 16 % (ref 11–15)
FOLATE SERPL-MCNC: 7.9 NG/ML (ref 5.4–?)
HCT VFR BLD AUTO: 38.2 % (ref 39–53)
HGB BLD-MCNC: 12.7 G/DL (ref 13–17.5)
IMM GRANULOCYTES # BLD AUTO: 0.03 X10(3) UL (ref 0–1)
IMM GRANULOCYTES NFR BLD: 0.5 %
IRON SATN MFR SERPL: 13 % (ref 20–50)
IRON SERPL-MCNC: 37 UG/DL (ref 65–175)
LYMPHOCYTES # BLD AUTO: 1.55 X10(3) UL (ref 1–4)
LYMPHOCYTES NFR BLD AUTO: 27.7 %
MCH RBC QN AUTO: 29.1 PG (ref 26–34)
MCHC RBC AUTO-ENTMCNC: 33.2 G/DL (ref 31–37)
MCV RBC AUTO: 87.4 FL (ref 80–100)
MONOCYTES # BLD AUTO: 0.21 X10(3) UL (ref 0.1–1)
MONOCYTES NFR BLD AUTO: 3.8 %
NEUTROPHILS # BLD AUTO: 3.53 X10 (3) UL (ref 1.5–7.7)
NEUTROPHILS # BLD AUTO: 3.53 X10(3) UL (ref 1.5–7.7)
NEUTROPHILS NFR BLD AUTO: 63.2 %
PLATELET # BLD AUTO: 201 10(3)UL (ref 150–450)
RBC # BLD AUTO: 4.37 X10(6)UL (ref 4.3–5.7)
TOTAL IRON BINDING CAPACITY: 295 UG/DL (ref 250–425)
TRANSFERRIN SERPL-MCNC: 214 MG/DL (ref 215–365)
VIT B12 SERPL-MCNC: 660 PG/ML (ref 211–911)
WBC # BLD AUTO: 5.6 X10(3) UL (ref 4–11)

## 2025-07-24 PROCEDURE — 85025 COMPLETE CBC W/AUTO DIFF WBC: CPT

## 2025-07-24 PROCEDURE — 86225 DNA ANTIBODY NATIVE: CPT

## 2025-07-24 PROCEDURE — 82728 ASSAY OF FERRITIN: CPT

## 2025-07-24 PROCEDURE — 82746 ASSAY OF FOLIC ACID SERUM: CPT

## 2025-07-24 PROCEDURE — 82607 VITAMIN B-12: CPT

## 2025-07-24 PROCEDURE — 83540 ASSAY OF IRON: CPT

## 2025-07-24 PROCEDURE — 86038 ANTINUCLEAR ANTIBODIES: CPT

## 2025-07-24 PROCEDURE — 84466 ASSAY OF TRANSFERRIN: CPT

## 2025-07-24 PROCEDURE — 36415 COLL VENOUS BLD VENIPUNCTURE: CPT

## 2025-07-24 PROCEDURE — 82164 ANGIOTENSIN I ENZYME TEST: CPT

## 2025-07-24 PROCEDURE — 85060 BLOOD SMEAR INTERPRETATION: CPT

## 2025-07-25 LAB
DSDNA IGG SERPL IA-ACNC: 1.2 IU/ML (ref ?–10)
ENA AB SER QL IA: 0.2 UG/L (ref ?–0.7)
ENA AB SER QL IA: NEGATIVE

## 2025-07-28 ENCOUNTER — TELEPHONE (OUTPATIENT)
Dept: SLEEP CENTER | Age: 65
End: 2025-07-28

## 2025-07-28 LAB — ACE: 74 U/L

## 2025-07-28 NOTE — PROGRESS NOTES
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  The patient presented with multiple weeks of low appetite unintentional 10 pound weight loss and imaging showed marked mediastinal lymphadenopathy and splenomegaly and 1.6 cm ill-defined anterior right upper lobe nodule thought to be possibly atypical infectious process or malignancy and oncology was consulted inpatient.  Laboratory workup unremarkable for lymphoma, EBUS cytology showed benign inflammatory lymph node changes including rare granulomas seen.  An outpatient PET/CT was obtained which showed hypermetabolic lymphadenopathy could be lymphoma or sarcoidosis and numerous subpleural nodular foci that were borderline hypermetabolic which could correlate for infection.    Clinically, he continues to have a low appetite although weight stable since discharge.  He is finished antibiotics.  Tentative plans for restaging CT in about 3 months to ensure resolution of presumed pulmonary infection and stable lymphadenopathy.    Review of Systems:  Hematology/Oncology ROS performed and negative except as above in HPI    History/Other:   Past Medical History:  Past Medical History[1]    Past Surgical History:  Past Surgical History[2]    Current Medications:  Current Medications[3]    Allergies:   Allergies[4]    Family Medical History:  Family History[5]    Social History:  Social History     Socioeconomic History    Marital status:      Spouse name: Not on file    Number of children: Not on file    Years of education: Not on file    Highest education level: Not on file   Occupational History    Not on file   Tobacco Use     Smoking status: Former     Current packs/day: 0.00     Types: Cigarettes     Quit date: 2008     Years since quittin.5     Passive exposure: Past    Smokeless tobacco: Former   Vaping Use    Vaping status: Never Used   Substance and Sexual Activity    Alcohol use: Not Currently     Alcohol/week: 3.0 standard drinks of alcohol     Types: 3 Cans of beer per week     Comment: quit     Drug use: Never    Sexual activity: Not on file   Other Topics Concern    Not on file   Social History Narrative    Not on file     Social Drivers of Health     Food Insecurity: No Food Insecurity (2025)    NCSS - Food Insecurity     Worried About Running Out of Food in the Last Year: No     Ran Out of Food in the Last Year: No   Transportation Needs: No Transportation Needs (2025)    NCSS - Transportation     Lack of Transportation: No   Housing Stability: Not At Risk (2025)    NCSS - Housing/Utilities     Has Housing: Yes     Worried About Losing Housing: No     Unable to Get Utilities: No       Objective:   Blood pressure 122/75, pulse 102, temperature 98.3 °F (36.8 °C), temperature source Tympanic, resp. rate 16, height 1.676 m (5' 6\"), weight 81.2 kg (179 lb), SpO2 98%.  Physical Exam:  ECOG PS: 0  General: A&Ox3, NAD  HEENT: PERRL, OP clear  CV: RRR, no murmurs  Pulm: CTA b/l, normal effort  Abd: soft, ntnd, no HSM or masses  Lymph: no palpable lymphadenopathy  Extremities: no edema  Neurological: grossly intact    Results:   Labs:  Lab Results   Component Value Date    WBC 5.6 2025    HGB 12.7 (L) 2025    HCT 38.2 (L) 2025    .0 2025    CREATSERUM 0.67 (L) 2025    BUN 12 2025     (L) 2025    K 3.6 2025     2025    CO2 25.0 2025    GLU 96 2025    CA 9.3 2025    ALB 3.0 (L) 2025    ALKPHO 282 (H) 2025    BILT 0.4 2025    TP 7.8 2025    AST 46 (H) 2025     (H) 2025    T4F  1.0 05/12/2025    TSH 2.119 05/20/2025    LIP 19 07/06/2025    PSA 0.8 04/17/2017    MG 1.9 07/12/2025    PHOS 2.8 07/12/2025    B12 660 07/24/2025       Assessment & Plan:   Karel Ford is a 64 year old male who presents for outpatient follow-up after hospital admission 7/6/2025 through 7/12/2025 with 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  The patient presented with multiple weeks of low appetite unintentional 10 pound weight loss and imaging showed marked mediastinal lymphadenopathy and splenomegaly and 1.6 cm ill-defined anterior right upper lobe nodule thought to be possibly atypical infectious process or malignancy and oncology was consulted inpatient.  Laboratory workup unremarkable for lymphoma, EBUS cytology showed benign inflammatory lymph node changes including rare granulomas seen.  An outpatient PET/CT was obtained which showed hypermetabolic lymphadenopathy could be lymphoma or sarcoidosis and numerous subpleural nodular foci that were borderline hypermetabolic which could correlate for infection.    There is no definitive evidence for a malignant process.  Will obtain additional laboratory evaluation and touch base over the phone pending these results.  Consider rheumatology referral given imaging findings suggestive of sarcoidosis, check ACE and JESSICA screen.  Certainly, if symptoms persist or worsen we will more strongly consider invasive anne sampling and shorter interval restaging.    EULOGIO Elena,     [facility] Hematology/Oncology  [facility]     This note was created using a voice-recognition transcribing system. Incorrect words or phrases may have been missed during proofreading. Please interpret accordingly.       [1]   Past Medical History:   Essential hypertension    Hyperlipidemia   [2] History reviewed. No pertinent surgical history.  [3]    diphenhydrAMINE (BANOPHEN) 25 MG Oral Cap Take 1 capsule (25  mg total) by mouth every 6 (six) hours as needed for Itching or Allergies.      amLODIPine 10 MG Oral Tab Take 1 tablet (10 mg total) by mouth daily. 90 tablet 3    rosuvastatin 10 MG Oral Tab Take 1 tablet (10 mg total) by mouth nightly. FOR CHOLESTEROL. 90 tablet 3    EPINEPHrine (EPIPEN 2-ROSENDA) 0.3 MG/0.3ML Injection Solution Auto-injector Inject IM in event of  allergic reaction 1 each 0    levocetirizine 5 MG Oral Tab Take 1 tablet (5 mg total) by mouth in the morning and 1 tablet (5 mg total) before bedtime. (Patient taking differently: Take 1 tablet (5 mg total) by mouth as needed.) 180 tablet 1   [4]   Allergies  Allergen Reactions    Citrated Pterylmonoglutamic Acid [Glutamic Acid] UNKNOWN    Dander UNKNOWN    [place] UNKNOWN    Ragweed UNKNOWN    R[place] UNKNOWN    Lisinopril RASH   [5]   Family History  Problem Relation Age of Onset    Lung Disorder Mother     Heart Disease Father

## 2025-08-05 ENCOUNTER — OFFICE VISIT (OUTPATIENT)
Dept: DERMATOLOGY CLINIC | Facility: CLINIC | Age: 65
End: 2025-08-05

## 2025-08-05 DIAGNOSIS — L20.9 ATOPIC DERMATITIS, UNSPECIFIED TYPE: Primary | ICD-10-CM

## 2025-08-05 PROCEDURE — 99204 OFFICE O/P NEW MOD 45 MIN: CPT | Performed by: STUDENT IN AN ORGANIZED HEALTH CARE EDUCATION/TRAINING PROGRAM

## 2025-08-06 ENCOUNTER — OFFICE VISIT (OUTPATIENT)
Dept: ALLERGY | Facility: CLINIC | Age: 65
End: 2025-08-06

## 2025-08-06 VITALS — WEIGHT: 179 LBS | BODY MASS INDEX: 29 KG/M2

## 2025-08-06 DIAGNOSIS — Z23 NEED FOR PROPHYLACTIC VACCINATION WITH STREPTOCOCCUS PNEUMONIAE (PNEUMOCOCCUS) AND INFLUENZA VACCINES: ICD-10-CM

## 2025-08-06 DIAGNOSIS — L50.1 CHRONIC IDIOPATHIC URTICARIA: Primary | ICD-10-CM

## 2025-08-06 DIAGNOSIS — Z23 FLU VACCINE NEED: ICD-10-CM

## 2025-08-06 DIAGNOSIS — Z23 NEED FOR COVID-19 VACCINE: ICD-10-CM

## 2025-08-06 PROCEDURE — 99214 OFFICE O/P EST MOD 30 MIN: CPT | Performed by: ALLERGY & IMMUNOLOGY

## 2025-08-06 RX ORDER — TRIAMCINOLONE ACETONIDE 1 MG/G
OINTMENT TOPICAL
Qty: 60 G | Refills: 0 | Status: SHIPPED | OUTPATIENT
Start: 2025-08-06

## 2025-08-10 ENCOUNTER — PATIENT MESSAGE (OUTPATIENT)
Dept: INTERNAL MEDICINE CLINIC | Facility: CLINIC | Age: 65
End: 2025-08-10

## 2025-08-13 RX ORDER — DIPHENHYDRAMINE HCL 25 MG
25 CAPSULE ORAL EVERY 6 HOURS PRN
Qty: 90 CAPSULE | Refills: 0 | Status: SHIPPED | OUTPATIENT
Start: 2025-08-13

## 2025-08-18 DIAGNOSIS — E78.5 HYPERLIPIDEMIA, UNSPECIFIED HYPERLIPIDEMIA TYPE: ICD-10-CM

## 2025-08-19 ENCOUNTER — NURSE ONLY (OUTPATIENT)
Dept: INTERNAL MEDICINE CLINIC | Facility: CLINIC | Age: 65
End: 2025-08-19

## 2025-08-19 DIAGNOSIS — Z23 IMMUNIZATION DUE: Primary | ICD-10-CM

## 2025-08-22 RX ORDER — ROSUVASTATIN CALCIUM 10 MG/1
10 TABLET, COATED ORAL NIGHTLY
Qty: 90 TABLET | Refills: 3 | OUTPATIENT
Start: 2025-08-22

## (undated) DEVICE — DBD-NEBULIZER,AEROMISTBAN,TMTHPIECE,7'TU: Brand: MEDLINE

## (undated) DEVICE — SINGLE USE BIOPSY VALVE MAJ-210: Brand: SINGLE USE BIOPSY VALVE (STERILE)

## (undated) DEVICE — ADAPTER BRONCHSCP 15MM FBROPT SWVL 2 AXIS

## (undated) DEVICE — SYRINGE MED 10ML SLIP TIP CLR BRL TAPR PLUNG

## (undated) DEVICE — NEEDLE ASPIR 21GA X 20-40MM 51 DEG ANG TIP

## (undated) DEVICE — MEDI-VAC NON-CONDUCTIVE SUCTION TUBING: Brand: CARDINAL HEALTH

## (undated) DEVICE — SYRINGE MED 10ML LL TIP W/O SFTY DISP

## (undated) DEVICE — SYRINGE MNJCT 10ML LF STRL REG

## (undated) DEVICE — 60 ML SYRINGE REGULAR TIP: Brand: MONOJECT

## (undated) DEVICE — V2 SPECIMEN COLLECTION MANIFOLD KIT: Brand: NEPTUNE

## (undated) DEVICE — Device

## (undated) DEVICE — TRAP MCS 40ML 5IN PLS SCR CAP

## (undated) DEVICE — Device: Brand: BALLOON

## (undated) DEVICE — KIT CLEAN ENDOKIT 1.1OZ GOWNX2

## (undated) DEVICE — CONMED SCOPE SAVER BITE BLOCK, 20X27 MM: Brand: SCOPE SAVER

## (undated) DEVICE — YANKAUER,BULB TIP,W/O VENT,RIGID,STERILE: Brand: MEDLINE

## (undated) DEVICE — SINGLE USE SUCTION VALVE MAJ-209: Brand: SINGLE USE SUCTION VALVE (STERILE)

## (undated) NOTE — LETTER
[place] [address]  Authorization for Invasive Procedures  Date: 7/10/2025           Time: 2120     Por la presente, autorizo al doctor ronnie Fernandez DO médico y al asistente a realizar la operación/procedimiento quirúrgico a continuación, así bc a administrar la anestesia que determine necesaria mi médico Nombre (s) de la operación/procedimiento:  Bronchoscopy with endobronchial ultrasound guided (EBUS) lymph node biopsies en Karel Ford            Reconozco que jarek la operación/procedimiento quirúrgico, las condiciones imprevistas pueden requerir de procedimientos adicionales o diferentes a aquellos mencionados anteriormente.  Por lo tanto, autorizo y solicito además que el cirujano antes mencionado, los asistentes o las personas designadas realicen los procedimientos que, a moody juicio, dalia necesarios y convenientes.    Mi cirujano/médico mittal discutido antes de mi cirugía los posibles beneficios, riesgos y efectos secundarios de darryn procedimiento, la probabilidad de alcanzar las metas y los posibles problemas que puedan ocurrir jarek la recuperación.  Ellos también mcgrath discutido las alternativas razonables al procedimiento, incluso los riesgos, beneficios y efectos secundarios relacionados con las alternativas y los riesgos relacionados con no realizar darryn procedimiento.  Mcgrath respondido a todas mis preguntas y confirmo que no se ha dado ninguna garantía en cuanto a los resultados que pueda obtener.    En marco antonio de que surja la necesidad jarek mi operación o jarek el periodo postoperatorio, también autorizo se aplique zuleyma y/o productos sanguíneos.  Asimismo, entiendo que a pesar de las cuidadosas pruebas y análisis de zuleyma o de los productos sanguíneos que realizan las entidades recolectoras, todavía puedo estar sujeto a efectos adversos bc resultado de recibir eladio transfusión de zuleyma y/o productos sanguíneos.  A continuación se mencionan algunos, aunque no todos,  los riesgos potenciales que pueden ocurrir: fiebre y reacciones alérgicas, reacciones hemolíticas, trasmisión de enfermedades bc hepatitis, SIDA y citomegalovirus (CMV), así bc sobrecarga de líquidos.   En marco antonio de que desee tener eladio transfusión autóloga de mi propia zuleyma o eladio transfusión de un donante dirigido.  Lo discutiré con mi médico.   Check only if Refusing Blood or Blood Products  I understand refusal of blood or blood products as deemed necessary by my physician may have serious consequences to my condition to include possible death. I hereby assume responsibility for my refusal and release the hospital, its personnel, and my physicians from any responsibility for the consequences of my refusal.           o  Refuse   Autorizo el uso de cualquier muestra, órgano, tejido, parte del cuerpo u objeto extraño que pueda ser extraído de mi cuerpo jarek la operación/procedimiento para fines de diagnóstico, investigación o de enseñanza y moody desecho posterior por las autoridades del hospital.  También, autorizo la revelación de los resultados de las pruebas de muestras y/o los informes escritos al médico tratante bc personal médico del hospital u otros médicos de referencia o consulta involucrados en mi atención, a discreción del patólogo o de mi médico tratante.    Doy consentimiento para que se fotografíen o graben vídeos de las operaciones o procedimientos a realizarse, incluidas las partes de mi cuerpo que dalia adecuadas para propósitos médicos, científicos o educativos, en el entendido de que, mi identidad no sea revelada por las fotografías o por textos descriptivos que las acompañen.  Si el procedimiento es fotografiado o grabado en vídeo, el cirujano obtendrá la imagen, cinta de vídeo o CD original.  El hospital no se hará responsable por el almacenamiento, la revelación o el mantenimiento de la imagen, cinta o CD.    Autorizo la presencia de un especialista de producto u observadores en el  quirófano, según lo considere necesario mi médico o las personas que éste designe.     Reconozco que en marco antonio de que mi procedimiento resulte en un tiempo prolongado de radiografía/fluoroscopia, puedo desarrollar eladio reacción en la piel.    Si tengo eladio orden de No intentar la reanimación (NICOLAS), jessi estado se suspenderá mientras esté en el quirófano, la gurpreet de procedimientos y jarek el periodo de recuperación a menos que yo indique lo contrario explícitamente (o eladio persona autorizada a ga el consentimiento en mi nombre). El cirujano o mi médico tratante determinarán cuándo termina el periodo de recuperación aplicable a los efectos de restablecer la orden de NICOLAS.  Pacientes que se realizan un procedimiento de esterilización: Entiendo que si el procedimiento tiene éxito, los resultados serán permanentes y que, por lo tanto, me será imposible inseminar, concebir o tener hijos.  Asimismo, entiendo que el procedimiento tiene bc propósito la esterilidad, aunque el resultado no está garantizado.   Admito que mi médico me ha explicado la aplicación de sedación/analgesia, incluidos los riesgos y beneficios y, consiento a la administración de sedación/analgesia conforme sea necesario o conveniente a juicio de mi médico.    CERTIFICO QUE HE LEÍDO Y COMPRENDIDO EL CONSENTIMIENTO ANTERIOR PARA LA OPERACIÓN y/o PROCEDIMIENTO.      ___________________________________________________________________________________________________________________   (Firma del paciente)             (Fecha)     (Hora)    ___________________________________________________________________________________________________________________   (Persona responsable del paciente mark de edad o inconsciente) (Relación con el paciente)  ___________________________________________________________________________________________________________________  (Firma del testigo)             (Fecha)     (Hora)   Patient Name: Karel VU: 10/6/1960     Reviewed: 2024   Printed: July 10, 2025  Medical Record #: [id number] Page 1 of 2           DECLARACIÓN DEL MÉDICO: Mi firma a continuación ratifica que, antes de la hora del procedimiento, he explicado al paciente y/o a moody representante legal los riesgos y beneficios que comprende el tratamiento propuesto y cualquier alternativa razonable al tratamiento propuesto. También he explicado los riesgos y beneficios que comprende rechazar el tratamiento propuesto y las alternativas al tratamiento propuesto, y he respondido las preguntas del paciente. Si tengo algún interés financiero importante en un acuerdo de gestión compartida o un interés financiero importante en cualquier producto o implante, u otra relación importante usada en darryn procedimiento/cirugía, lo he divulgado y discutido con mi paciente.      _______________________________________________________________________________________________________                (Firma del médico)                                                                                    (Fecha)                                       (Hora)              Patient Name: Karel Ford  : 10/6/1960    Reviewed: 2024   Printed: July 10, 2025  Medical Record #: [id number] Page 2 of 2

## (undated) NOTE — LETTER
[place] [address]  Authorization for Invasive Procedures  Date: 07/08/2025           Time: 11:20    I hereby authorize Nick Johnson , my physician and his/her assistants (if applicable), which may include medical students, residents, and/or fellows, to perform the following surgical operation/ procedure and administer such anesthesia as may be determined necessary by my physician: Ultrasound Guided Thoracenteses on Karel Ford  2.   I recognize that during the surgical operation/procedure, unforeseen conditions may necessitate additional or different procedures than those listed above.  I, therefore, further authorize and request that the above-named surgeon, assistants, or designees perform such procedures as are, in their judgment, necessary and desirable.    3.   My surgeon/physician has discussed prior to my surgery the potential benefits, risks and side effects of this procedure; the likelihood of achieving goals; and potential problems that might occur during recuperation.  They also discussed reasonable alternatives to the procedure, including risks, benefits, and side effects related to the alternatives and risks related to not receiving this procedure.  I have had all my questions answered and I acknowledge that no guarantee has been made as to the result that may be obtained.    4.   Should the need arise during my operation/procedure, which includes change of level of care prior to discharge, I also consent to the administration of blood and/or blood products.  Further, I understand that despite careful testing and screening of blood or blood products by collecting agencies, I may still be subject to ill effects as a result of receiving a blood transfusion and/or blood products.  The following are some, but not all, of the potential risks that can occur: fever and allergic reactions, hemolytic reactions, transmission of diseases such as Hepatitis, AIDS and Cytomegalovirus  (CMV) and fluid overload.  In the event that I wish to have an autologous transfusion of my own blood, or a directed donor transfusion, I will discuss this with my physician.   Check only if Refusing Blood or Blood Products  I understand refusal of blood or blood products as deemed necessary by my physician may have serious consequences to my condition to include possible death. I hereby assume responsibility for my refusal and release the hospital, its personnel, and my physicians from any responsibility for the consequences of my refusal.         o  Refuse         5.   I authorize the use of any specimen, organs, tissues, body parts or foreign objects that may be removed from my body during the operation/procedure for diagnosis, research or teaching purposes and their subsequent disposal by hospital authorities.  I also authorize the release of specimen test results and/or written reports to my treating physician on the hospital medical staff or other referring or consulting physicians involved in my care, at the discretion of the Pathologist or my treating physician.    6.   I consent to the photographing or videotaping of the operations or procedures to be performed, including appropriate portions of my body for medical, scientific, or educational purposes, provided my identity is not revealed by the pictures or by descriptive texts accompanying them.  If the procedure has been photographed/videotaped, the surgeon will obtain the original picture, image, videotape or CD.  The hospital will not be responsible for storage, release or maintenance of the picture, image, tape or CD.    7.   I consent to the presence of a  or observers in the operating room as deemed necessary by my physician or their designees.    8.   I recognize that in the event my procedure results in extended X-Ray/fluoroscopy time, I may develop a skin reaction.    9. If I have a Do Not Attempt Resuscitation (DNAR) order in  place, that status will be suspended while in the operating room, procedural suite, and during the recovery period unless otherwise explicitly stated by me (or a person authorized to consent on my behalf). The surgeon or my attending physician will determine when the applicable recovery period ends for purposes of reinstating the DNAR order.  10. Patients having a sterilization procedure: I understand that if the procedure is successful the results will be permanent and it will therefore be impossible for me to inseminate, conceive, or bear children.  I also understand that the procedure is intended to result in sterility, although the result has not been guaranteed.   11. I acknowledge that my physician has explained sedation/analgesia administration to me including the risk and benefits I consent to the administration of sedation/analgesia as may be necessary or desirable in the judgment of my physician.    I CERTIFY THAT I HAVE READ AND FULLY UNDERSTAND THE ABOVE CONSENT TO OPERATION and/or OTHER PROCEDURE.        ____________________________________       _________________________________      ______________________________  Signature of Patient         Signature of Responsible Person        Printed Name of Responsible Person        ____________________________________      _________________________________      ______________________________       Signature of Witness          Relationship to Patient                       Date                                       Time  Patient Name: Karel Ford  : 10/6/1960    Reviewed: 2024   Printed: 2025  Medical Record #: [id number] Page 1 of 2             STATEMENT OF PHYSICIAN My signature below affirms that prior to the time of the procedure; I have explained to the patient and/or his/her legal representative, the risks and benefits involved in the proposed treatment and any reasonable alternative to the proposed treatment. I have also explained the  risks and benefits involved in refusal of the proposed treatment and alternatives to the proposed treatment and have answered the patient's questions. If I have a significant financial interest in a co-management agreement or a significant financial interest in any product or implant, or other significant relationship used in this procedure/surgery, I have disclosed this and had a discussion with my patient.     _______________________________________________________________ _____________________________  (Signature of Physician)                                                                                         (Date)                                   (Time)  Patient Name: Karel Ford  : 10/6/1960    Reviewed: 2024   Printed: 2025  Medical Record #: [id number] Page 2 of 2

## (undated) NOTE — LETTER
[facility]   Administracion de Anestesia  Yo, Karel Ford, acepto ser atendido por un anestesiólogo, quien está especialmente capacitado para monitorearme y darme medicamento para hacerme dormir o mantenerme cómodo jarek mi procedimiento.   Entiendo que mi anestesiólogo no es un empleado o agente de [facility]. Él o sidra trabaja para [facility]  [place] el paciente que solicita los servicios de anestesia, estoy de acuerdo con lo siguiente:  Permitir al anestesiólogo (médico de anestesia) que me suministre el medicamento y hacer los procedimientos adicionales que dalia necesarios. Algunos ejemplos son: Al iniciar o utilizar eladio “IV” para suministrarme medicamentos, fluidos o zuleyma jarek mi procedimiento, y colocarme eladio sonda de respiración, me ayudará a respirar cuando esté dormido (intubación). En el marco antonio de que mi corazón deje de funcionar correctamente, entiendo que mi anestesiólogo hará todo lo posible para salvar mi bar, a menos que los documentos de No resucitar de [facility] lo indiquen de otra manera.  Informar a mi anestesista antes del procedimiento:  Si estoy embarazada.  La última vez que comí o bebí algo.  Todos los medicamentos que sachin (incluyendo medicamentos recetados, suplementos herbales y pastillas que puedo comprar sin receta médica (incluyendo drogas en la silva [medicamentos ilegales]). No informar a mi anestesiólogo acerca de estos medicamentos puede aumentar mi riesgo de complicaciones con la anestesia.  Si soy alérgico a cualquier cosa o he tenido previamente eladio reacción adversa a la anestesia.  Entiendo cómo la anestesia me ayudará (beneficios).  Entiendo que con cualquier tipo de anestesia hay riesgos:  Los riesgos más comunes son: náuseas, vómitos, dolor de garganta, dolor muscular, daño a los ojos, la boca o los dientes (por la colocación de la sonda de respiración).  Los riesgos poco comunes incluyen: recordar lo  que sucedió jarek mi procedimiento, reacciones alérgicas a los medicamentos, lesiones en las vías respiratorias, el corazón, los pulmones, la visión, los nervios o músculos, y en casos sumamente raros, la muerte.  Mii médico me ha explicado otras opciones de atención disponibles para mí (alternativas).  Pacientes embarazadas (“epidural”):    Entiendo que los riesgos de recibir eladio inyección epidural (medicamento que se aplica en la espalda para ayudar a controlar el dolor jarek el parto), incluyen picazón, presión arterial baja, dificultad para orinar, dolor de ema o disminución en el ritmo del corazón del bebé. Los riesgos muy poco comunes incluyen infección, hemorragia, convulsiones, ritmo cardíaco irregular y lesión del nervio.  Anestesia regional (“columna vertebral”, “epidural” y “bloqueo de los nervios”):  Entiendo que hay complicaciones poco frecuentes dennis posibles, e incluyen dolor de ema, sangrado, infección, convulsiones, ritmo cardíaco irregular y la lesión del nervio.  .   Puedo cambiar de opinión acerca de recibir servicios de anestesia en cualquier momento antes de que se me administre el medicamento.         Paciente (o representante) Firma/Relación con el paciente  Fecha  Hora  Patient Signature/Signature of Responsible person Date Time           Nombre del intérprete (en moody marco antonio)  Idioma/Organización  Hora  Name of  Language/Organization Time          Firma del anestesiólogo Fecha  Hora  Signature of anesthesiologist Date Time    He hablado del procedimiento y la información anterior con el paciente (o el representante del paciente) y respondí fred preguntas. El paciente o moody representante ha aceptado recibir los servicios de anestesia.         Testigo Fecha  Hora  Witness Date Time  He verificado que la firma es la del paciente o del representante del paciente, y que se firmó antes del procedimiento.    Nombre del paciente: Karel patino Nac.: 10/6/1960                 En  john trejo: July 10, 2025  Expediente médico No. [id number]                         Nathalie 1 de 2  ----------ANESTHESIA CONSENT----------

## (undated) NOTE — MR AVS SNAPSHOT
[address], [address]  [address] [facility]  [phone]               Thank you for choosing us for your health care visit with Maria Luisa Ferguson MD.  We are glad to serve you and happy to provide you with this summ [address]   Phone:  [phone]   Fax:  [phone]    Diagnoses:  Colon cancer screening   Order:  Evaluate & Treat, Alexa Vega (Internal)    [address] [facility] [place]  [address]         Referr Commonly known as:  VIAGRA                Where to Get Your Medications      You can get these medications from any pharmacy     Bring a paper prescription for each of these medications    - Sildenafil Citrate 50 MG Tabs            Sanjana     Sign up for Women and lighter weight persons: limit to <= 1 drink* per day. Healthy Diet and Regular Exercise  [employer] [address] for making healthy food choices  -   Enjoy your food, but eat less.   Fully enjoy your food when ea

## (undated) NOTE — LETTER
4/29/2024    Karel Ford  [address] [facility] [address]    Dear Karel,    We would like to inform you that your account has been charged $40 for not showing up to the office for your scheduled appointment on 04/26/24.    Our no-show policy is as follows: A 24-hour notice is required, or you may be charged a $40 No Show fee.      If you are unable to keep your scheduled appointment, please notify us at least 24 hours in advance so we can accommodate our other patients. You may also reschedule your appointment at that time.    On the third no-show, within a 12-month period, it will be the physician’s discretion as to whether a discharge letter will be sent out disengaging you from the practice and giving you 30 days to enroll with a new non [facility] physician.    If you would like to contest this charge, please call [phone].    Sincerely,  [facility]

## (undated) NOTE — LETTER
Date & Time: 6/9/2025, 2:12 AM  Patient: Karel Ford  Encounter Provider(s):    Eryn Polanco MD       To Whom It May Concern:    Karel Ford was seen and treated in our department on 6/8/2025. He should not return to work until Wednesday, June 11, 2025.    If you have any questions or concerns, please do not hesitate to call.        _____________________________  Physician/APC Signature

## (undated) NOTE — [place] [address]  Authorization for Invasive Procedures  Date: 7/9/2025           Time: 1450    I hereby authorize Dr. Johnson, my physician and his/her assistants (if applicable), which may include medical students, residents, and/or fellows, to perform the following surgical operation/ procedure and administer such anesthesia as may be determined necessary by my physician: right sided ultrasound guided thoracentesis on Karel Ford  2.   I recognize that during the surgical operation/procedure, unforeseen conditions may necessitate additional or different procedures than those listed above.  I, therefore, further authorize and request that the above-named surgeon, assistants, or designees perform such procedures as are, in their judgment, necessary and desirable.    3.   My surgeon/physician has discussed prior to my surgery the potential benefits, risks and side effects of this procedure; the likelihood of achieving goals; and potential problems that might occur during recuperation.  They also discussed reasonable alternatives to the procedure, including risks, benefits, and side effects related to the alternatives and risks related to not receiving this procedure.  I have had all my questions answered and I acknowledge that no guarantee has been made as to the result that may be obtained.    4.   Should the need arise during my operation/procedure, which includes change of level of care prior to discharge, I also consent to the administration of blood and/or blood products.  Further, I understand that despite careful testing and screening of blood or blood products by collecting agencies, I may still be subject to ill effects as a result of receiving a blood transfusion and/or blood products.  The following are some, but not all, of the potential risks that can occur: fever and allergic reactions, hemolytic reactions, transmission of diseases such as Hepatitis, AIDS and Cytomegalovirus  (CMV) and fluid overload.  In the event that I wish to have an autologous transfusion of my own blood, or a directed donor transfusion, I will discuss this with my physician.   Check only if Refusing Blood or Blood Products  I understand refusal of blood or blood products as deemed necessary by my physician may have serious consequences to my condition to include possible death. I hereby assume responsibility for my refusal and release the hospital, its personnel, and my physicians from any responsibility for the consequences of my refusal.         o  Refuse         5.   I authorize the use of any specimen, organs, tissues, body parts or foreign objects that may be removed from my body during the operation/procedure for diagnosis, research or teaching purposes and their subsequent disposal by hospital authorities.  I also authorize the release of specimen test results and/or written reports to my treating physician on the hospital medical staff or other referring or consulting physicians involved in my care, at the discretion of the Pathologist or my treating physician.    6.   I consent to the photographing or videotaping of the operations or procedures to be performed, including appropriate portions of my body for medical, scientific, or educational purposes, provided my identity is not revealed by the pictures or by descriptive texts accompanying them.  If the procedure has been photographed/videotaped, the surgeon will obtain the original picture, image, videotape or CD.  The hospital will not be responsible for storage, release or maintenance of the picture, image, tape or CD.    7.   I consent to the presence of a  or observers in the operating room as deemed necessary by my physician or their designees.    8.   I recognize that in the event my procedure results in extended X-Ray/fluoroscopy time, I may develop a skin reaction.    9. If I have a Do Not Attempt Resuscitation (DNAR) order in  place, that status will be suspended while in the operating room, procedural suite, and during the recovery period unless otherwise explicitly stated by me (or a person authorized to consent on my behalf). The surgeon or my attending physician will determine when the applicable recovery period ends for purposes of reinstating the DNAR order.  10. Patients having a sterilization procedure: I understand that if the procedure is successful the results will be permanent and it will therefore be impossible for me to inseminate, conceive, or bear children.  I also understand that the procedure is intended to result in sterility, although the result has not been guaranteed.   11. I acknowledge that my physician has explained sedation/analgesia administration to me including the risk and benefits I consent to the administration of sedation/analgesia as may be necessary or desirable in the judgment of my physician.    I CERTIFY THAT I HAVE READ AND FULLY UNDERSTAND THE ABOVE CONSENT TO OPERATION and/or OTHER PROCEDURE.        ____________________________________       _________________________________      ______________________________  Signature of Patient         Signature of Responsible Person        Printed Name of Responsible Person        ____________________________________      _________________________________      ______________________________       Signature of Witness          Relationship to Patient                       Date                                       Time  Patient Name: Karel Ford  : 10/6/1960    Reviewed: 2024   Printed: 2025  Medical Record #: [id number] Page 1 of 2             STATEMENT OF PHYSICIAN My signature below affirms that prior to the time of the procedure; I have explained to the patient and/or his/her legal representative, the risks and benefits involved in the proposed treatment and any reasonable alternative to the proposed treatment. I have also explained the  risks and benefits involved in refusal of the proposed treatment and alternatives to the proposed treatment and have answered the patient's questions. If I have a significant financial interest in a co-management agreement or a significant financial interest in any product or implant, or other significant relationship used in this procedure/surgery, I have disclosed this and had a discussion with my patient.     _______________________________________________________________ _____________________________  (Signature of Physician)                                                                                         (Date)                                   (Time)  Patient Name: Karel Ford  : 10/6/1960    Reviewed: 2024   Printed: 2025  Medical Record #: [id number] Page 2 of 2

## (undated) NOTE — LETTER
7/15/2025          To Whom It May Concern:    Karel Ford is currently under my medical care and may not return to work at this time due to recent hospitalization dating from 7/6/2025 thru 7/12/2025 with still ongoing workup, and recovery  He may return to work on July 28th, 2025 pending clinical improvement.       If you require additional information please contact our office.        Sincerely,    Martin Adams MD          Document generated by:  Martin Adams MD